# Patient Record
Sex: MALE | Race: WHITE | NOT HISPANIC OR LATINO | Employment: OTHER | URBAN - METROPOLITAN AREA
[De-identification: names, ages, dates, MRNs, and addresses within clinical notes are randomized per-mention and may not be internally consistent; named-entity substitution may affect disease eponyms.]

---

## 2023-04-17 PROBLEM — N52.9 MALE ERECTILE DISORDER OF ORGANIC ORIGIN: Status: ACTIVE | Noted: 2023-04-17

## 2023-04-17 PROBLEM — M25.562 BILATERAL KNEE PAIN: Status: ACTIVE | Noted: 2023-04-17

## 2023-04-17 PROBLEM — R05.3 CHRONIC COUGH: Status: ACTIVE | Noted: 2023-04-17

## 2023-04-17 PROBLEM — R60.0 LOWER LEG EDEMA: Status: ACTIVE | Noted: 2023-04-17

## 2023-04-17 PROBLEM — M62.81 MUSCLE WEAKNESS (GENERALIZED): Status: ACTIVE | Noted: 2023-04-17

## 2023-04-17 PROBLEM — M32.14 LUPUS NEPHRITIS (MULTI): Status: ACTIVE | Noted: 2023-04-17

## 2023-04-17 PROBLEM — F41.0 PANIC DISORDER WITHOUT AGORAPHOBIA: Status: ACTIVE | Noted: 2023-04-17

## 2023-04-17 PROBLEM — I10 BENIGN ESSENTIAL HYPERTENSION: Status: ACTIVE | Noted: 2023-04-17

## 2023-04-17 PROBLEM — D12.6 TUBULAR ADENOMA OF COLON: Status: ACTIVE | Noted: 2023-04-17

## 2023-04-17 PROBLEM — I11.9 LVH (LEFT VENTRICULAR HYPERTROPHY) DUE TO HYPERTENSIVE DISEASE: Status: ACTIVE | Noted: 2023-04-17

## 2023-04-17 PROBLEM — M25.551: Status: ACTIVE | Noted: 2023-04-17

## 2023-04-17 PROBLEM — K40.90 RIGHT INGUINAL HERNIA: Status: ACTIVE | Noted: 2023-04-17

## 2023-04-17 PROBLEM — M62.559 ATROPHY OF MUSCLE OF THIGH: Status: ACTIVE | Noted: 2023-04-17

## 2023-04-17 PROBLEM — L98.9 BENIGN SKIN LESION OF MULTIPLE SITES: Status: ACTIVE | Noted: 2023-04-17

## 2023-04-17 PROBLEM — M79.606 LEG PAIN: Status: ACTIVE | Noted: 2023-04-17

## 2023-04-17 PROBLEM — M25.552: Status: ACTIVE | Noted: 2023-04-17

## 2023-04-17 PROBLEM — K52.9 CHRONIC DIARRHEA: Status: ACTIVE | Noted: 2023-04-17

## 2023-04-17 PROBLEM — M25.561 BILATERAL KNEE PAIN: Status: ACTIVE | Noted: 2023-04-17

## 2023-04-17 PROBLEM — R91.8 LUNG NODULE, MULTIPLE: Status: ACTIVE | Noted: 2023-04-17

## 2023-04-17 PROBLEM — R36.1 HEMATOSPERMIA: Status: ACTIVE | Noted: 2023-04-17

## 2023-04-17 PROBLEM — C91.10 CHRONIC LYMPHOCYTIC LEUKEMIA (MULTI): Status: ACTIVE | Noted: 2023-04-17

## 2023-04-17 PROBLEM — E78.5 HYPERLIPIDEMIA, UNSPECIFIED: Status: ACTIVE | Noted: 2023-04-17

## 2023-04-17 PROBLEM — N40.0 BENIGN ENLARGEMENT OF PROSTATE: Status: ACTIVE | Noted: 2023-04-17

## 2023-04-17 PROBLEM — M70.72 BURSITIS OF LEFT HIP: Status: ACTIVE | Noted: 2023-04-17

## 2023-04-17 PROBLEM — R16.1 SPLENOMEGALY: Status: ACTIVE | Noted: 2023-04-17

## 2023-04-17 PROBLEM — L40.9 PSORIASIS: Status: ACTIVE | Noted: 2023-04-17

## 2023-04-17 PROBLEM — N04.9 NEPHROTIC SYNDROME: Status: ACTIVE | Noted: 2023-04-17

## 2023-04-17 PROBLEM — I15.1 HYPERTENSION SECONDARY TO OTHER RENAL DISORDERS: Status: ACTIVE | Noted: 2023-04-17

## 2023-04-17 PROBLEM — R29.898 MUSCULAR DECONDITIONING: Status: ACTIVE | Noted: 2023-04-17

## 2023-04-17 PROBLEM — F41.0 PANIC ATTACKS: Status: ACTIVE | Noted: 2023-04-17

## 2023-04-17 PROBLEM — S36.029A: Status: ACTIVE | Noted: 2023-04-17

## 2023-04-17 PROBLEM — E55.9 VITAMIN D DEFICIENCY, UNSPECIFIED: Status: ACTIVE | Noted: 2023-04-17

## 2023-04-17 PROBLEM — M10.9 GOUT: Status: ACTIVE | Noted: 2023-04-17

## 2023-04-18 ENCOUNTER — OFFICE VISIT (OUTPATIENT)
Dept: PRIMARY CARE | Facility: CLINIC | Age: 73
End: 2023-04-18
Payer: MEDICARE

## 2023-04-18 VITALS
RESPIRATION RATE: 16 BRPM | HEART RATE: 80 BPM | DIASTOLIC BLOOD PRESSURE: 70 MMHG | SYSTOLIC BLOOD PRESSURE: 128 MMHG | HEIGHT: 75 IN | OXYGEN SATURATION: 98 % | BODY MASS INDEX: 23.5 KG/M2 | TEMPERATURE: 98.1 F | WEIGHT: 189 LBS

## 2023-04-18 DIAGNOSIS — M79.604 PAIN IN BOTH LOWER EXTREMITIES: ICD-10-CM

## 2023-04-18 DIAGNOSIS — I10 BENIGN ESSENTIAL HYPERTENSION: ICD-10-CM

## 2023-04-18 DIAGNOSIS — N52.9 MALE ERECTILE DISORDER OF ORGANIC ORIGIN: ICD-10-CM

## 2023-04-18 DIAGNOSIS — N40.0 BENIGN PROSTATIC HYPERPLASIA WITHOUT LOWER URINARY TRACT SYMPTOMS: Primary | ICD-10-CM

## 2023-04-18 DIAGNOSIS — R29.898 MUSCULAR DECONDITIONING: ICD-10-CM

## 2023-04-18 DIAGNOSIS — C91.10 CHRONIC LYMPHOCYTIC LEUKEMIA (MULTI): ICD-10-CM

## 2023-04-18 DIAGNOSIS — M79.605 PAIN IN BOTH LOWER EXTREMITIES: ICD-10-CM

## 2023-04-18 DIAGNOSIS — E78.2 MIXED HYPERLIPIDEMIA: ICD-10-CM

## 2023-04-18 PROBLEM — R05.3 CHRONIC COUGH: Status: RESOLVED | Noted: 2023-04-17 | Resolved: 2023-04-18

## 2023-04-18 PROBLEM — N04.9 NEPHROTIC SYNDROME: Status: RESOLVED | Noted: 2023-04-17 | Resolved: 2023-04-18

## 2023-04-18 PROBLEM — M32.14 LUPUS NEPHRITIS (MULTI): Status: RESOLVED | Noted: 2023-04-17 | Resolved: 2023-04-18

## 2023-04-18 PROCEDURE — 1170F FXNL STATUS ASSESSED: CPT | Performed by: INTERNAL MEDICINE

## 2023-04-18 PROCEDURE — 1160F RVW MEDS BY RX/DR IN RCRD: CPT | Performed by: INTERNAL MEDICINE

## 2023-04-18 PROCEDURE — 99214 OFFICE O/P EST MOD 30 MIN: CPT | Performed by: INTERNAL MEDICINE

## 2023-04-18 PROCEDURE — 1036F TOBACCO NON-USER: CPT | Performed by: INTERNAL MEDICINE

## 2023-04-18 PROCEDURE — 3078F DIAST BP <80 MM HG: CPT | Performed by: INTERNAL MEDICINE

## 2023-04-18 PROCEDURE — G0439 PPPS, SUBSEQ VISIT: HCPCS | Performed by: INTERNAL MEDICINE

## 2023-04-18 PROCEDURE — 3074F SYST BP LT 130 MM HG: CPT | Performed by: INTERNAL MEDICINE

## 2023-04-18 PROCEDURE — 1159F MED LIST DOCD IN RCRD: CPT | Performed by: INTERNAL MEDICINE

## 2023-04-18 PROCEDURE — 1157F ADVNC CARE PLAN IN RCRD: CPT | Performed by: INTERNAL MEDICINE

## 2023-04-18 RX ORDER — IBUPROFEN 600 MG/1
1 TABLET ORAL EVERY 6 HOURS PRN
COMMUNITY
Start: 2023-02-10 | End: 2023-10-18 | Stop reason: ALTCHOICE

## 2023-04-18 RX ORDER — ALPRAZOLAM 0.5 MG/1
0.5 TABLET ORAL DAILY PRN
COMMUNITY
Start: 2022-11-14

## 2023-04-18 RX ORDER — SILDENAFIL 50 MG/1
50 TABLET, FILM COATED ORAL DAILY PRN
COMMUNITY
End: 2023-11-20 | Stop reason: SDUPTHER

## 2023-04-18 RX ORDER — LOSARTAN POTASSIUM 50 MG/1
50 TABLET ORAL DAILY
COMMUNITY
End: 2023-06-05 | Stop reason: SDUPTHER

## 2023-04-18 ASSESSMENT — ACTIVITIES OF DAILY LIVING (ADL)
DRESSING: INDEPENDENT
GROCERY_SHOPPING: INDEPENDENT
TAKING_MEDICATION: INDEPENDENT
MANAGING_FINANCES: INDEPENDENT
DOING_HOUSEWORK: INDEPENDENT
BATHING: INDEPENDENT

## 2023-04-18 ASSESSMENT — ENCOUNTER SYMPTOMS
SLEEP DISTURBANCE: 0
TROUBLE SWALLOWING: 0
DIARRHEA: 0
CONSTIPATION: 0
ABDOMINAL PAIN: 0
WEAKNESS: 1
MYALGIAS: 1

## 2023-04-18 ASSESSMENT — PATIENT HEALTH QUESTIONNAIRE - PHQ9
1. LITTLE INTEREST OR PLEASURE IN DOING THINGS: NOT AT ALL
2. FEELING DOWN, DEPRESSED OR HOPELESS: NOT AT ALL
SUM OF ALL RESPONSES TO PHQ9 QUESTIONS 1 AND 2: 0

## 2023-04-18 NOTE — PROGRESS NOTES
Patient here for a Medicare Wellness visit and follow up    Subjective   Patient ID: Rafael Walter is a 72 y.o. male who presents for Medicare Annual Wellness Visit Subsequent and Follow-up.    The patient reports ongoing bilateral lower limb pain and weakness he suspects is due to either physical deconditioning or is a side effect from chemotherapy.  The pain seems to occur with standing and walking, and subsides with rest.  He completed a number of physical therapy sessions which has helped.  He is also using a TENS device at home which provides some relief.  He is not currently on statin therapy.      The patient continues to receive once monthly immunoglobulin infusion therapy for CLL.  He has been off of daily oral chemotherapy since 6/2022, and states he is feeling relatively well.  He undergoes occasional CT scans for surveillance.  He is currently following with  from Hematology/Oncology.    The patient reports longstanding nocturia of about 3 times per night which is normal for him.      The patient wears a hearing aid device, and prescription lenses both of which are working well.  The patient was recently found to have left deteriorating optic nerve and is following with Ophthalmology for monitoring.  He completed an MRI in 12/2022 which he recalls was reassuring.  He takes sleeping pills over the counter two or three times a week, and notes good sleep quality.  He denies any dysphagia, abdominal pain, or bowel problems.        Review of Systems   HENT:  Positive for hearing loss. Negative for trouble swallowing.    Gastrointestinal:  Negative for abdominal pain, constipation and diarrhea.   Genitourinary:         Positive for nocturia.   Musculoskeletal:  Positive for myalgias.   Neurological:  Positive for weakness.   Psychiatric/Behavioral:  Negative for sleep disturbance.        Objective   Physical Exam  Constitutional:       Appearance: Normal appearance.   Cardiovascular:      Rate and  Rhythm: Normal rate and regular rhythm.      Heart sounds: Normal heart sounds.      Comments: No carotid bruit bilaterally.  Pulmonary:      Effort: Pulmonary effort is normal.      Breath sounds: Normal breath sounds.   Abdominal:      General: Bowel sounds are normal.      Palpations: Abdomen is soft.      Tenderness: There is no abdominal tenderness.   Musculoskeletal:      Comments: Negative straight leg raise test.     Skin:     General: Skin is warm and dry.   Neurological:      General: No focal deficit present.      Mental Status: He is alert and oriented to person, place, and time. Mental status is at baseline.   Psychiatric:         Mood and Affect: Mood normal.         Behavior: Behavior normal.       Assessment/Plan   Problem List Items Addressed This Visit       Muscular deconditioning    Benign enlargement of prostate - Primary    Relevant Orders    PSA    Benign essential hypertension    Chronic lymphocytic leukemia (CMS/HCC)    Hyperlipidemia, unspecified    Relevant Orders    Lipid panel    Leg pain    Relevant Orders    CK    Male erectile disorder of organic origin       Medicare Wellness Examination Done  -  Discussed healthy diet and regular exercise.    -  Physical exam overall unremarkable. Immunizations reviewed and updated accordingly. Healthy lifestyle choices discussed (tobacco avoidance, appropriate alcohol use, avoidance of illicit substances).   -  Patient is wearing seatbelt.   -  Screening lab work ordered as indicated.    -  Age appropriate screening tests reviewed with patient.       IMPRESSIONS/PLAN:    Physical Deconditioning/Lower limb Pain  -Advised patient to ask  if this may be related to chemotherapy.  Continue with exercises at home for now.  Ordered CK.  Call the clinic if symptoms persist or worsen, and will order referral for Physical Therapy.    HLD  - Ordered lipid panel.    BPH/Nocturia   - Last PSA wnl 6/2022.      HTN   - /70. Continue Losartan 50 mg  daily,     Lupus nephritis   - has seen nephrology / rheum    Multiple Skin Lesion   - Referral to Dermatology given to patient.     Chronic lymphocytic leukemia   - Following with Dr. Field in Oncology.     Health Maintenance  -Routine labs 1/2023.  Last PSA wnl 6/2022.  Last Cologuard 2022, repeat due 2025. Advised patient to receive Shingrix series, TDAP, and Pneumonia immunization through the pharmacy separately, after receiving go ahead from  from Hematology/Oncology.  Discussed adverse effects.     Follow up for annual physical examination, call sooner if needed.       Scribe Attestation  By signing my name below, I, Frank Mckinnon   attest that this documentation has been prepared under the direction and in the presence of Milo Gold DO.

## 2023-04-21 ENCOUNTER — LAB (OUTPATIENT)
Dept: LAB | Facility: LAB | Age: 73
End: 2023-04-21
Payer: MEDICARE

## 2023-04-21 DIAGNOSIS — N40.0 BENIGN PROSTATIC HYPERPLASIA WITHOUT LOWER URINARY TRACT SYMPTOMS: ICD-10-CM

## 2023-04-21 DIAGNOSIS — M79.605 PAIN IN BOTH LOWER EXTREMITIES: ICD-10-CM

## 2023-04-21 DIAGNOSIS — M79.604 PAIN IN BOTH LOWER EXTREMITIES: ICD-10-CM

## 2023-04-21 DIAGNOSIS — E78.2 MIXED HYPERLIPIDEMIA: ICD-10-CM

## 2023-04-21 LAB
CHOLESTEROL (MG/DL) IN SER/PLAS: 148 MG/DL (ref 0–199)
CHOLESTEROL IN HDL (MG/DL) IN SER/PLAS: 25.6 MG/DL
CHOLESTEROL/HDL RATIO: 5.8
CREATINE KINASE (U/L) IN SER/PLAS: 30 U/L (ref 0–325)
LDL: 79 MG/DL (ref 0–99)
NON HDL CHOLESTEROL: 122 MG/DL
PROSTATE SPECIFIC AG (NG/ML) IN SER/PLAS: 2.87 NG/ML (ref 0–4)
TRIGLYCERIDE (MG/DL) IN SER/PLAS: 216 MG/DL (ref 0–149)
VLDL: 43 MG/DL (ref 0–40)

## 2023-04-21 PROCEDURE — 80061 LIPID PANEL: CPT

## 2023-04-21 PROCEDURE — 84153 ASSAY OF PSA TOTAL: CPT

## 2023-04-21 PROCEDURE — 36415 COLL VENOUS BLD VENIPUNCTURE: CPT

## 2023-04-21 PROCEDURE — 82550 ASSAY OF CK (CPK): CPT

## 2023-06-01 ENCOUNTER — TELEPHONE (OUTPATIENT)
Dept: PRIMARY CARE | Facility: CLINIC | Age: 73
End: 2023-06-01
Payer: MEDICARE

## 2023-06-01 NOTE — TELEPHONE ENCOUNTER
Pt would like to know if he can get an order for his legs for PT to see what is going on. He said he had PT in the fall that Dr. Gold ordered. Please advise

## 2023-06-02 ENCOUNTER — TELEPHONE (OUTPATIENT)
Dept: PRIMARY CARE | Facility: CLINIC | Age: 73
End: 2023-06-02
Payer: MEDICARE

## 2023-06-02 DIAGNOSIS — M79.605 PAIN IN BOTH LOWER EXTREMITIES: Primary | ICD-10-CM

## 2023-06-02 DIAGNOSIS — M79.604 PAIN IN BOTH LOWER EXTREMITIES: Primary | ICD-10-CM

## 2023-06-02 NOTE — TELEPHONE ENCOUNTER
Patient needs a new PT referral put into system as he tried to schedule an appointment, and they indicated that a new referral needs to be placed. Please advise when new order is put into system.

## 2023-06-05 ENCOUNTER — TELEPHONE (OUTPATIENT)
Dept: PRIMARY CARE | Facility: CLINIC | Age: 73
End: 2023-06-05
Payer: MEDICARE

## 2023-06-05 DIAGNOSIS — I10 BENIGN ESSENTIAL HYPERTENSION: Primary | ICD-10-CM

## 2023-06-05 RX ORDER — LOSARTAN POTASSIUM 50 MG/1
50 TABLET ORAL DAILY
Qty: 90 TABLET | Refills: 2 | Status: SHIPPED | OUTPATIENT
Start: 2023-06-05

## 2023-06-05 NOTE — TELEPHONE ENCOUNTER
Patients wife [phone]- Patient has discussed leg issues in the past at visits, he does wear socks at night to help with circulation. They're flying this week is this going to be an issues for the patient. She would like to discuss this with you before they travel- Could this be also a vascular issue?

## 2023-06-07 ENCOUNTER — OFFICE VISIT (OUTPATIENT)
Dept: PRIMARY CARE | Facility: CLINIC | Age: 73
End: 2023-06-07
Payer: MEDICARE

## 2023-06-07 VITALS
HEART RATE: 77 BPM | BODY MASS INDEX: 24.07 KG/M2 | SYSTOLIC BLOOD PRESSURE: 148 MMHG | RESPIRATION RATE: 16 BRPM | TEMPERATURE: 97.9 F | WEIGHT: 190 LBS | OXYGEN SATURATION: 100 % | DIASTOLIC BLOOD PRESSURE: 80 MMHG

## 2023-06-07 DIAGNOSIS — M25.561 ACUTE PAIN OF RIGHT KNEE: Primary | ICD-10-CM

## 2023-06-07 PROCEDURE — 3079F DIAST BP 80-89 MM HG: CPT | Performed by: INTERNAL MEDICINE

## 2023-06-07 PROCEDURE — 3077F SYST BP >= 140 MM HG: CPT | Performed by: INTERNAL MEDICINE

## 2023-06-07 PROCEDURE — 1160F RVW MEDS BY RX/DR IN RCRD: CPT | Performed by: INTERNAL MEDICINE

## 2023-06-07 PROCEDURE — 1036F TOBACCO NON-USER: CPT | Performed by: INTERNAL MEDICINE

## 2023-06-07 PROCEDURE — 99214 OFFICE O/P EST MOD 30 MIN: CPT | Performed by: INTERNAL MEDICINE

## 2023-06-07 PROCEDURE — 1159F MED LIST DOCD IN RCRD: CPT | Performed by: INTERNAL MEDICINE

## 2023-06-07 PROCEDURE — 1157F ADVNC CARE PLAN IN RCRD: CPT | Performed by: INTERNAL MEDICINE

## 2023-06-07 ASSESSMENT — ENCOUNTER SYMPTOMS
JOINT SWELLING: 0
LEG PAIN: 1

## 2023-06-07 NOTE — PROGRESS NOTES
Patient here for leg pain     Subjective   Patient ID: Rafael Walter is a 72 y.o. male who presents for Leg Pain.  He is accompanied by his son, Carlos Eduardo, who offers some of the history.    The patient reports worsening intermittent right lower limb and knee pain as he has been working while kneeling on concrete.  He has been using foam rollers which have been minimally beneficial, but reports difficulty with sleep due to pain.  The patient does endorse some knee instability.  He recalls injuring the right hamstring while waterskiing many years prior.      The patient recently completed blood work for  from Hematology/Oncology for CLL, and reports that the condition is stable.  He notes that his Physician will be transferred to a different department, and will be following with a new Specialist at the same clinic soon.      Leg Pain       Review of Systems   Musculoskeletal:  Negative for joint swelling.        Positive for right lower limb pain, right knee pain.       Objective   Physical Exam  Constitutional:       Appearance: Normal appearance.   Cardiovascular:      Rate and Rhythm: Normal rate and regular rhythm.      Heart sounds: Normal heart sounds.   Pulmonary:      Effort: Pulmonary effort is normal.      Breath sounds: Normal breath sounds.   Abdominal:      General: Bowel sounds are normal.      Palpations: Abdomen is soft.      Tenderness: There is no abdominal tenderness.   Musculoskeletal:         General: No swelling or tenderness.      Comments: No erythema, swelling, or tenderness in the right calf.  Positive for crepitus in the right knee joint.   Skin:     General: Skin is warm and dry.   Neurological:      General: No focal deficit present.      Mental Status: He is alert and oriented to person, place, and time. Mental status is at baseline.   Psychiatric:         Mood and Affect: Mood normal.         Behavior: Behavior normal.       Assessment/Plan   Problem List Items Addressed This  Visit    None  Visit Diagnoses       Acute pain of right knee    -  Primary    Relevant Orders    XR knee right 3 views            IMPRESSIONS/PLAN:     Physical Deconditioning/Right Lower limb Pain  -No tenderness, erythema or swelling in the knee or calf.   Good DP / PT pulses. Crepitus present in the right knee.  Unlikely due to vascular causes.  CK wnl 4/2023.  Ordered Xray Right Knee.  Advised the patient to try Voltaren gel sparingly over the counter, as directed on the packaging.  Can additionally use Tylenol as needed.  Continue with exercises at home for now.  Call the clinic if symptoms persist, and will order referral to Physical Therapy.     HLD  - Triglycerides high per 4/2023.     BPH/Nocturia   - Last PSA wnl 6/2022.       HTN   - /80. Continue Losartan 50 mg daily,     Lupus nephritis   - has seen nephrology / rheum     Multiple Skin Lesion   - Referral to Dermatology given to patient.     Chronic lymphocytic leukemia   - Following with Dr. Field in Oncology.     Health Maintenance  -Routine labs 1/2023.  Last PSA wnl 6/2022.  Last Cologuard 2022, repeat due 2025. Advised patient to receive Shingrix series, TDAP, and Pneumonia immunization through the pharmacy separately, after receiving go ahead from  from Hematology/Oncology.  Discussed adverse effects.     Follow up for annual physical examination, call sooner if needed.       Scribe Attestation  By signing my name below, ILyly Scribe   attest that this documentation has been prepared under the direction and in the presence of Milo Gold DO.

## 2023-06-14 ENCOUNTER — TELEPHONE (OUTPATIENT)
Dept: PRIMARY CARE | Facility: CLINIC | Age: 73
End: 2023-06-14
Payer: MEDICARE

## 2023-06-14 DIAGNOSIS — M25.569 KNEE PAIN, UNSPECIFIED CHRONICITY, UNSPECIFIED LATERALITY: Primary | ICD-10-CM

## 2023-06-14 RX ORDER — GABAPENTIN 300 MG/1
300 CAPSULE ORAL NIGHTLY
Qty: 30 CAPSULE | Refills: 0 | Status: SHIPPED | OUTPATIENT
Start: 2023-06-14 | End: 2023-10-18 | Stop reason: ALTCHOICE

## 2023-06-14 NOTE — TELEPHONE ENCOUNTER
Had his knee xray and has a call into PT to schedule physical therapy however pt did not sleep last night due to pain and wanted to know if he can get a stronger medication. Pharmacy [facility]

## 2023-06-14 NOTE — TELEPHONE ENCOUNTER
X-ray showed mild knee osteoarthritis- otherwise looked OK.  Would recommend seeing the walk-in clinic at [facility] in [place] if pain is that bad- may benefit from a potential steroid shot if they feel that is appropriate.

## 2023-06-14 NOTE — TELEPHONE ENCOUNTER
I sent him in [facility] to take at bedtime.  Can make him a little groggy so only use at bedtime.  Should help with the pain a bit.  Continue to use the topicals as well.  Can use a bit of Tylenol (stay under 2,000 mg daily) as needed also.

## 2023-06-14 NOTE — TELEPHONE ENCOUNTER
Patient called back. Couldn't get into clinic in [place]. Tapia until next Thursday. Is there any pain relief medications that he can be prescribed until his upcoming appt.?

## 2023-10-18 ENCOUNTER — OFFICE VISIT (OUTPATIENT)
Dept: PRIMARY CARE | Facility: CLINIC | Age: 73
End: 2023-10-18
Payer: MEDICARE

## 2023-10-18 VITALS
OXYGEN SATURATION: 97 % | WEIGHT: 192 LBS | RESPIRATION RATE: 16 BRPM | SYSTOLIC BLOOD PRESSURE: 142 MMHG | TEMPERATURE: 97.1 F | BODY MASS INDEX: 24 KG/M2 | HEART RATE: 76 BPM | DIASTOLIC BLOOD PRESSURE: 80 MMHG

## 2023-10-18 DIAGNOSIS — Z23 ENCOUNTER FOR IMMUNIZATION: Primary | ICD-10-CM

## 2023-10-18 DIAGNOSIS — I10 BENIGN ESSENTIAL HYPERTENSION: ICD-10-CM

## 2023-10-18 DIAGNOSIS — C91.10 CHRONIC LYMPHOCYTIC LEUKEMIA (MULTI): ICD-10-CM

## 2023-10-18 DIAGNOSIS — E78.2 MIXED HYPERLIPIDEMIA: ICD-10-CM

## 2023-10-18 PROCEDURE — 90662 IIV NO PRSV INCREASED AG IM: CPT | Performed by: INTERNAL MEDICINE

## 2023-10-18 PROCEDURE — 1159F MED LIST DOCD IN RCRD: CPT | Performed by: INTERNAL MEDICINE

## 2023-10-18 PROCEDURE — G0008 ADMIN INFLUENZA VIRUS VAC: HCPCS | Performed by: INTERNAL MEDICINE

## 2023-10-18 PROCEDURE — 99214 OFFICE O/P EST MOD 30 MIN: CPT | Performed by: INTERNAL MEDICINE

## 2023-10-18 PROCEDURE — 3079F DIAST BP 80-89 MM HG: CPT | Performed by: INTERNAL MEDICINE

## 2023-10-18 PROCEDURE — 1036F TOBACCO NON-USER: CPT | Performed by: INTERNAL MEDICINE

## 2023-10-18 PROCEDURE — 3077F SYST BP >= 140 MM HG: CPT | Performed by: INTERNAL MEDICINE

## 2023-10-18 PROCEDURE — 1160F RVW MEDS BY RX/DR IN RCRD: CPT | Performed by: INTERNAL MEDICINE

## 2023-10-18 RX ORDER — MELOXICAM 15 MG/1
15 TABLET ORAL DAILY PRN
COMMUNITY
End: 2024-01-22 | Stop reason: ALTCHOICE

## 2023-10-18 ASSESSMENT — ENCOUNTER SYMPTOMS: ROS SKIN COMMENTS: POSITIVE FOR SKIN LESIONS.

## 2023-10-18 NOTE — PROGRESS NOTES
Patient here for a 6 month follow up    Subjective   Patient ID: Rafael Walter is a 73 y.o. male who presents for Follow-up.    The patient has established care with  from Hematology/Oncology for a history of CLL.  He notes that the condition is stable.    The patient maintains an active lifestyle, and exercises at the gym regularly.  He has also been following with Physical Therapy for deconditioning, and feels this is helping.    The patient met with Orthopedic Surgery, and was prescribed meloxicam for right knee pain which has been effective.  He has not been taking Ibuprofen or gabapentin.      The patient has been measuring his blood pressure at home, and notes that readings are within the normal range.     The patient reports a skin lesion for less than year.  He recalls a history of skin cancer in the past.      The patient mentions blurry vision in the left eye that was found to be due to an issue with the left optic nerve.  He continues to follow with Ophthalmology.      Review of Systems   Musculoskeletal:         Positive for right knee pain.   Skin:         Positive for skin lesions.   All other systems reviewed and are negative.    Objective   Physical Exam  Constitutional:       Appearance: Normal appearance.   Neck:      Vascular: No carotid bruit.   Cardiovascular:      Rate and Rhythm: Normal rate and regular rhythm.      Heart sounds: Normal heart sounds.   Pulmonary:      Effort: Pulmonary effort is normal.      Breath sounds: Normal breath sounds.   Abdominal:      General: Bowel sounds are normal.      Palpations: Abdomen is soft.      Tenderness: There is no abdominal tenderness.   Skin:     General: Skin is warm and dry.   Neurological:      General: No focal deficit present.      Mental Status: He is alert and oriented to person, place, and time. Mental status is at baseline.   Psychiatric:         Mood and Affect: Mood normal.         Behavior: Behavior normal.          Assessment/Plan   Problem List Items Addressed This Visit    None      IMPRESSIONS/PLAN:     Skin Lesion:  - Ordered referral to  in Dermatology.    HTN   - /80. Continue Losartan 50 mg daily,    HLD  - Triglycerides high per 4/2023.     BPH/Nocturia   - Last PSA wnl 4/2023.       Lupus nephritis   - has seen nephrology / rheum     Chronic lymphocytic leukemia   - Following with Dr. Field in Oncology.     Physical Deconditioning  - Following with Physical Therapy.     Right Knee Pain  -Xray Right Knee 6/2023. Showed mild osteoarthritis  Following with Orthopedic Surgery and Physical therapy.    Health Maintenance  -Routine labs 4/2023.  Last PSA wnl 4/2023.  Last Cologuard 2022, repeat due 2025. Advised patient to check with Hematology/Oncology before receivin Shingrix and RSV vaccines.  Patient received high-dose Influenza vaccine in the clinic today, tolerated well.      Follow up for annual physical examination, call sooner if needed.       Scribe Attestation  By signing my name below, ILyly Scribe   attest that this documentation has been prepared under the direction and in the presence of Milo Gold DO.

## 2023-10-25 ENCOUNTER — TELEPHONE (OUTPATIENT)
Dept: ORTHOPEDIC SURGERY | Facility: CLINIC | Age: 73
End: 2023-10-25
Payer: MEDICARE

## 2023-10-26 DIAGNOSIS — M17.11 OSTEOARTHRITIS OF RIGHT KNEE, UNSPECIFIED OSTEOARTHRITIS TYPE: Primary | ICD-10-CM

## 2023-10-26 RX ORDER — MELOXICAM 15 MG/1
15 TABLET ORAL DAILY
Qty: 30 TABLET | Refills: 0 | Status: SHIPPED | OUTPATIENT
Start: 2023-10-26 | End: 2023-11-24

## 2023-11-20 DIAGNOSIS — N52.9 MALE ERECTILE DISORDER OF ORGANIC ORIGIN: ICD-10-CM

## 2023-11-20 DIAGNOSIS — N52.9 MALE ERECTILE DISORDER OF ORGANIC ORIGIN: Primary | ICD-10-CM

## 2023-11-20 RX ORDER — SILDENAFIL 50 MG/1
50 TABLET, FILM COATED ORAL DAILY PRN
Qty: 10 TABLET | Refills: 1 | Status: SHIPPED | OUTPATIENT
Start: 2023-11-20 | End: 2024-05-03 | Stop reason: SDUPTHER

## 2023-11-20 RX ORDER — SILDENAFIL 50 MG/1
50 TABLET, FILM COATED ORAL DAILY PRN
Qty: 10 TABLET | Refills: 1 | Status: SHIPPED | OUTPATIENT
Start: 2023-11-20 | End: 2023-11-20 | Stop reason: SDUPTHER

## 2023-12-08 ENCOUNTER — APPOINTMENT (OUTPATIENT)
Dept: HEMATOLOGY/ONCOLOGY | Facility: HOSPITAL | Age: 73
End: 2023-12-08
Payer: MEDICARE

## 2023-12-14 ENCOUNTER — LAB (OUTPATIENT)
Dept: LAB | Facility: HOSPITAL | Age: 73
End: 2023-12-14
Payer: MEDICARE

## 2023-12-14 ENCOUNTER — OFFICE VISIT (OUTPATIENT)
Dept: HEMATOLOGY/ONCOLOGY | Facility: HOSPITAL | Age: 73
End: 2023-12-14
Payer: MEDICARE

## 2023-12-14 VITALS
OXYGEN SATURATION: 99 % | RESPIRATION RATE: 17 BRPM | DIASTOLIC BLOOD PRESSURE: 73 MMHG | WEIGHT: 190.1 LBS | HEART RATE: 67 BPM | TEMPERATURE: 97.3 F | SYSTOLIC BLOOD PRESSURE: 153 MMHG | BODY MASS INDEX: 24.4 KG/M2 | HEIGHT: 74 IN

## 2023-12-14 DIAGNOSIS — C91.10 CHRONIC LYMPHOCYTIC LEUKEMIA (MULTI): ICD-10-CM

## 2023-12-14 DIAGNOSIS — C91.10 CHRONIC LYMPHOCYTIC LEUKEMIA (MULTI): Primary | ICD-10-CM

## 2023-12-14 LAB
ALBUMIN SERPL BCP-MCNC: 4.2 G/DL (ref 3.4–5)
ALP SERPL-CCNC: 78 U/L (ref 33–136)
ALT SERPL W P-5'-P-CCNC: 12 U/L (ref 10–52)
ANION GAP SERPL CALC-SCNC: 11 MMOL/L (ref 10–20)
AST SERPL W P-5'-P-CCNC: 17 U/L (ref 9–39)
BASOPHILS # BLD AUTO: 0.02 X10*3/UL (ref 0–0.1)
BASOPHILS NFR BLD AUTO: 0.5 %
BILIRUB SERPL-MCNC: 0.6 MG/DL (ref 0–1.2)
BUN SERPL-MCNC: 19 MG/DL (ref 6–23)
CALCIUM SERPL-MCNC: 9.1 MG/DL (ref 8.6–10.3)
CHLORIDE SERPL-SCNC: 105 MMOL/L (ref 98–107)
CO2 SERPL-SCNC: 29 MMOL/L (ref 21–32)
CREAT SERPL-MCNC: 0.98 MG/DL (ref 0.5–1.3)
EOSINOPHIL # BLD AUTO: 0.08 X10*3/UL (ref 0–0.4)
EOSINOPHIL NFR BLD AUTO: 2 %
ERYTHROCYTE [DISTWIDTH] IN BLOOD BY AUTOMATED COUNT: 14.1 % (ref 11.5–14.5)
GFR SERPL CREATININE-BSD FRML MDRD: 80 ML/MIN/1.73M*2
GLUCOSE SERPL-MCNC: 94 MG/DL (ref 74–99)
HCT VFR BLD AUTO: 43.7 % (ref 41–52)
HGB BLD-MCNC: 14.5 G/DL (ref 13.5–17.5)
IMM GRANULOCYTES # BLD AUTO: 0.01 X10*3/UL (ref 0–0.5)
IMM GRANULOCYTES NFR BLD AUTO: 0.2 % (ref 0–0.9)
LYMPHOCYTES # BLD AUTO: 1.16 X10*3/UL (ref 0.8–3)
LYMPHOCYTES NFR BLD AUTO: 28.8 %
MCH RBC QN AUTO: 28.2 PG (ref 26–34)
MCHC RBC AUTO-ENTMCNC: 33.2 G/DL (ref 32–36)
MCV RBC AUTO: 85 FL (ref 80–100)
MONOCYTES # BLD AUTO: 0.42 X10*3/UL (ref 0.05–0.8)
MONOCYTES NFR BLD AUTO: 10.4 %
NEUTROPHILS # BLD AUTO: 2.34 X10*3/UL (ref 1.6–5.5)
NEUTROPHILS NFR BLD AUTO: 58.1 %
NRBC BLD-RTO: 0 /100 WBCS (ref 0–0)
PLATELET # BLD AUTO: 134 X10*3/UL (ref 150–450)
POTASSIUM SERPL-SCNC: 4.4 MMOL/L (ref 3.5–5.3)
PROT SERPL-MCNC: 7 G/DL (ref 6.4–8.2)
RBC # BLD AUTO: 5.15 X10*6/UL (ref 4.5–5.9)
SODIUM SERPL-SCNC: 141 MMOL/L (ref 136–145)
WBC # BLD AUTO: 4 X10*3/UL (ref 4.4–11.3)

## 2023-12-14 PROCEDURE — 1159F MED LIST DOCD IN RCRD: CPT | Performed by: INTERNAL MEDICINE

## 2023-12-14 PROCEDURE — 1160F RVW MEDS BY RX/DR IN RCRD: CPT | Performed by: INTERNAL MEDICINE

## 2023-12-14 PROCEDURE — 99214 OFFICE O/P EST MOD 30 MIN: CPT | Performed by: INTERNAL MEDICINE

## 2023-12-14 PROCEDURE — 1126F AMNT PAIN NOTED NONE PRSNT: CPT | Performed by: INTERNAL MEDICINE

## 2023-12-14 PROCEDURE — 36415 COLL VENOUS BLD VENIPUNCTURE: CPT

## 2023-12-14 PROCEDURE — 85025 COMPLETE CBC W/AUTO DIFF WBC: CPT

## 2023-12-14 PROCEDURE — 80053 COMPREHEN METABOLIC PANEL: CPT

## 2023-12-14 PROCEDURE — 1036F TOBACCO NON-USER: CPT | Performed by: INTERNAL MEDICINE

## 2023-12-14 PROCEDURE — 3077F SYST BP >= 140 MM HG: CPT | Performed by: INTERNAL MEDICINE

## 2023-12-14 PROCEDURE — 3078F DIAST BP <80 MM HG: CPT | Performed by: INTERNAL MEDICINE

## 2023-12-14 ASSESSMENT — ENCOUNTER SYMPTOMS
OCCASIONAL FEELINGS OF UNSTEADINESS: 0
DEPRESSION: 0
LOSS OF SENSATION IN FEET: 0

## 2023-12-14 ASSESSMENT — PAIN SCALES - GENERAL: PAINLEVEL: 0-NO PAIN

## 2023-12-28 NOTE — PROGRESS NOTES
"Patient ID: Rafael Walter is a 73 y.o. male.    Subjective    The patient has a history of CLL dated first in 2019, most recently having completed Venetoclax + ritux in 7/2022, details below.     Today he admits mild fatigue. No fever, weight loss, night sweating. No nausea.     Treatment Synopsis:   - 1/2016 until 2/2019, CASE 5913 (curcumin + vitamin D for CLL).   - One cycle of Chlorambucil  (started on 6/10/2019 8mg per day) and obinutuzumab (started 7/22/19 when PLT <100).  Obinutuzumab restarted on 9/4/19 with profound neutropenia .  - Venetoclax 200 mg daily (with fluconazole) with monthly rituximab  6/1/2020.  - Cycle #2 started on 7/13/20.  - Cycle #3 started on 8/10/20-therapy changed from 200 mg venetoclax dosing to 400  mg and fluconazole discontinued.  - Cycle #4 9/10/20- clon seq showed excellent response, CT chest showed resolution of bibasilar infiltrates.  - Cycle #5 started 10/8/2020.  - Cycle #6 11/5/2020- completed rituximab.  - Venetoclax continued through 7/2022 (2 full years).            Objective    BSA: 2.12 meters squared  /73   Pulse 67   Temp 36.3 °C (97.3 °F) (Skin)   Resp 17   Ht (S) 1.874 m (6' 1.78\")   Wt 86.2 kg (190 lb 1.6 oz)   SpO2 99%   BMI 24.55 kg/m²      Physical Exam  Constitutional:       General: He is not in acute distress.     Appearance: He is not toxic-appearing.   HENT:      Head: Normocephalic.      Nose: Nose normal.      Mouth/Throat:      Mouth: Mucous membranes are moist.   Eyes:      Extraocular Movements: Extraocular movements intact.      Pupils: Pupils are equal, round, and reactive to light.   Cardiovascular:      Rate and Rhythm: Normal rate and regular rhythm.      Heart sounds: No murmur heard.  Pulmonary:      Effort: Pulmonary effort is normal.      Breath sounds: Normal breath sounds.   Abdominal:      General: Bowel sounds are normal.      Palpations: Abdomen is soft. There is no mass.      Tenderness: There is no abdominal tenderness. " There is no rebound.   Musculoskeletal:         General: No swelling, tenderness, deformity or signs of injury.      Right lower leg: No edema.      Left lower leg: No edema.   Skin:     Coloration: Skin is not jaundiced.      Findings: No bruising, lesion or rash.   Neurological:      Mental Status: He is alert and oriented to person, place, and time.      Cranial Nerves: No cranial nerve deficit.      Motor: No weakness.      Gait: Gait normal.   Psychiatric:         Mood and Affect: Mood normal.         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  - S/p curcumin + vit D [id number] (CASE 5913).  - WBC progressed and peaked at 286k on 6/4/2019. (was 184k IN 2/2019)  - S/p chlorambucil + obinutuzumab started 6/2019; c/b mold PNA.  - By June 2020 WBC was essentially normal, but clonoseq shows persistent clones (>8000) in blood.  - S/p Venetoclax + rituximab (6/2020-11/2020). Venetoclax continued through 7/2022.  - Doing well OFF therapy at this time. CBC/ALC stable. No new worrisome symptoms reported today.  - No clear indication to initiate new treatments. MRD remains an investigational tool. Will defer testing.      #Increased hgb  - Indeterminate clinical significance. Will be monitored.      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 No recent infections.  - He is no longer on acyclovir or Bactrim.    Plan:  -Send q-IG  -RTC 3 mon with labs.     Time spent > 35 min, with more than 50% on counseling and coordinating care.    Cancer Staging   No matching staging information was found for the patient.    Oncology History    No history exists.                 Cecilio Luciano MD PhD

## 2024-01-18 ENCOUNTER — PATIENT MESSAGE (OUTPATIENT)
Dept: PRIMARY CARE | Facility: CLINIC | Age: 74
End: 2024-01-18
Payer: MEDICARE

## 2024-01-22 ENCOUNTER — OFFICE VISIT (OUTPATIENT)
Dept: PRIMARY CARE | Facility: CLINIC | Age: 74
End: 2024-01-22
Payer: MEDICARE

## 2024-01-22 ENCOUNTER — HOSPITAL ENCOUNTER (OUTPATIENT)
Dept: RADIOLOGY | Facility: CLINIC | Age: 74
Discharge: HOME | End: 2024-01-22
Payer: MEDICARE

## 2024-01-22 VITALS
SYSTOLIC BLOOD PRESSURE: 158 MMHG | WEIGHT: 192 LBS | OXYGEN SATURATION: 100 % | DIASTOLIC BLOOD PRESSURE: 80 MMHG | BODY MASS INDEX: 24.8 KG/M2 | TEMPERATURE: 97.6 F | RESPIRATION RATE: 16 BRPM | HEART RATE: 63 BPM

## 2024-01-22 DIAGNOSIS — M25.562 LEFT KNEE PAIN, UNSPECIFIED CHRONICITY: ICD-10-CM

## 2024-01-22 DIAGNOSIS — C91.10 CHRONIC LYMPHOCYTIC LEUKEMIA (MULTI): ICD-10-CM

## 2024-01-22 DIAGNOSIS — M25.552 LEFT HIP PAIN: Primary | ICD-10-CM

## 2024-01-22 PROCEDURE — 73502 X-RAY EXAM HIP UNI 2-3 VIEWS: CPT | Mod: LEFT SIDE | Performed by: RADIOLOGY

## 2024-01-22 PROCEDURE — 3077F SYST BP >= 140 MM HG: CPT | Performed by: INTERNAL MEDICINE

## 2024-01-22 PROCEDURE — 73562 X-RAY EXAM OF KNEE 3: CPT | Mod: LEFT SIDE | Performed by: RADIOLOGY

## 2024-01-22 PROCEDURE — 73562 X-RAY EXAM OF KNEE 3: CPT | Mod: LT

## 2024-01-22 PROCEDURE — 1160F RVW MEDS BY RX/DR IN RCRD: CPT | Performed by: INTERNAL MEDICINE

## 2024-01-22 PROCEDURE — 73502 X-RAY EXAM HIP UNI 2-3 VIEWS: CPT | Mod: LT

## 2024-01-22 PROCEDURE — 99214 OFFICE O/P EST MOD 30 MIN: CPT | Performed by: INTERNAL MEDICINE

## 2024-01-22 PROCEDURE — 1159F MED LIST DOCD IN RCRD: CPT | Performed by: INTERNAL MEDICINE

## 2024-01-22 PROCEDURE — 1036F TOBACCO NON-USER: CPT | Performed by: INTERNAL MEDICINE

## 2024-01-22 PROCEDURE — 1126F AMNT PAIN NOTED NONE PRSNT: CPT | Performed by: INTERNAL MEDICINE

## 2024-01-22 PROCEDURE — 3079F DIAST BP 80-89 MM HG: CPT | Performed by: INTERNAL MEDICINE

## 2024-01-22 RX ORDER — NAPROXEN 500 MG/1
500 TABLET ORAL 2 TIMES DAILY PRN
Qty: 60 TABLET | Refills: 0 | Status: SHIPPED | OUTPATIENT
Start: 2024-01-22 | End: 2024-02-19

## 2024-01-22 ASSESSMENT — ENCOUNTER SYMPTOMS: LEG PAIN: 1

## 2024-01-22 NOTE — PROGRESS NOTES
Patient here for left leg pain    Subjective   Patient ID: Rafael Walter is a 73 y.o. male who presents for Leg Pain.    The patient reports left knee pain extending to the foot as a result of slipping and injuring himself while using the laying leg curl machine at the gym about 10/2023.  This seems to have exacerbated previous knee pain, and symptoms are pronounced while walking.  He also endorses sleep difficulty due to the knee and left lower extremity pain.  The patient denies any calf pain.  He has been taking meloxicam 15mg once daily as needed, and Tylenol with partial relief.  The patient has been undergoing physical therapy and finds this is helping.  He has previously followed with  from Orthopedic Surgery.      The patient reports ongoing left hip pain.     Leg Pain       Review of Systems   Musculoskeletal:         Positive for left knee pain, and hip pain.       Objective   Physical Exam  Constitutional:       Appearance: Normal appearance.   Neck:      Vascular: No carotid bruit.   Cardiovascular:      Rate and Rhythm: Normal rate and regular rhythm.      Heart sounds: Normal heart sounds.   Pulmonary:      Effort: Pulmonary effort is normal.      Breath sounds: Normal breath sounds.   Abdominal:      General: Bowel sounds are normal.      Palpations: Abdomen is soft.      Tenderness: There is no abdominal tenderness.   Skin:     General: Skin is warm and dry.   Neurological:      General: No focal deficit present.      Mental Status: He is alert and oriented to person, place, and time. Mental status is at baseline.   Psychiatric:         Mood and Affect: Mood normal.         Behavior: Behavior normal.       Assessment/Plan   Problem List Items Addressed This Visit             ICD-10-CM    Chronic lymphocytic leukemia (CMS/HCC) C91.10     Other Visit Diagnoses         Codes    Left hip pain    -  Primary M25.552    Relevant Medications    naproxen (Naprosyn) 500 mg tablet    Left knee pain,  unspecified chronicity     M25.562    Relevant Orders    XR hip left with pelvis when performed 2 or 3 views    XR knee left 3 views            IMPRESSIONS/PLAN:     Left Knee Pain  - No pain over meniscus or patellar tendon.  Hip pain in inguinal region on straight leg raise test.  No calf pain.  Ordered Xray Left Knee.  Advised patient to continue with Physical Therapy.  Prescribed naproxen 500mg twice daily prn along with Tylenol 650mg one tablet at night time for severe symptoms.  Stop Meloxicam, and patient verbalized understanding.  Encouraged patient to follow up with  from Orthopedic Surgery pending results.      Left Hip Pain  - Xray 6/2023 showed severe bilateral hip osteoarthrosis with loss of joint space, subchondral cysts and spurring. No fracture is identified.  Ordered repeat Left Hip Xray to assess for injury.  Continue with physical therapy.  Prescribed naproxen 500mg twice daily prn along with Tylenol 650mg one tablet at night time for severe symptoms.  Stop Meloxicam, and patient verbalized understanding.  Encouraged patient to follow up with  from Orthopedic Surgery pending results.      HTN   - /80. Continue Losartan 50 mg daily,     HLD  - Triglycerides high per 4/2023.     BPH/Nocturia   - Last PSA wnl 4/2023.       Lupus nephritis   - has seen nephrology / rheum     Chronic lymphocytic leukemia   - Following with Dr. Field in Oncology.     Physical Deconditioning  - Following with Physical Therapy.      Right Knee Pain  -Xray Right Knee 6/2023. Showed mild osteoarthritis.  Following with Orthopedic Surgery and Physical therapy.     Skin Lesion:  - Previously ordered referral to  in Dermatology.    Health Maintenance  -Routine labs 4/2023.  Last PSA wnl 4/2023.  Last Cologuard 2022, repeat due 2025. Advised patient to check with Hematology/Oncology before receivin Shingrix and RSV vaccines.       Follow up for annual physical examination, call sooner if needed.        Scribe Attestation  By signing my name below, I, Frank Mckinnon   attest that this documentation has been prepared under the direction and in the presence of Milo Gold DO.   Lyly Vazquez 01/22/24 10:10 AM

## 2024-02-08 ENCOUNTER — OFFICE VISIT (OUTPATIENT)
Dept: ORTHOPEDIC SURGERY | Facility: CLINIC | Age: 74
End: 2024-02-08
Payer: MEDICARE

## 2024-02-08 DIAGNOSIS — M16.11 PRIMARY OSTEOARTHRITIS OF RIGHT HIP: Primary | ICD-10-CM

## 2024-02-08 DIAGNOSIS — M25.551 RIGHT HIP PAIN: ICD-10-CM

## 2024-02-08 PROCEDURE — 99214 OFFICE O/P EST MOD 30 MIN: CPT | Performed by: FAMILY MEDICINE

## 2024-02-08 PROCEDURE — 20611 DRAIN/INJ JOINT/BURSA W/US: CPT | Performed by: FAMILY MEDICINE

## 2024-02-08 PROCEDURE — 2500000005 HC RX 250 GENERAL PHARMACY W/O HCPCS: Performed by: FAMILY MEDICINE

## 2024-02-08 PROCEDURE — 1159F MED LIST DOCD IN RCRD: CPT | Performed by: FAMILY MEDICINE

## 2024-02-08 PROCEDURE — 1036F TOBACCO NON-USER: CPT | Performed by: FAMILY MEDICINE

## 2024-02-08 PROCEDURE — 2500000004 HC RX 250 GENERAL PHARMACY W/ HCPCS (ALT 636 FOR OP/ED): Performed by: FAMILY MEDICINE

## 2024-02-08 PROCEDURE — 1126F AMNT PAIN NOTED NONE PRSNT: CPT | Performed by: FAMILY MEDICINE

## 2024-02-08 PROCEDURE — 1157F ADVNC CARE PLAN IN RCRD: CPT | Performed by: FAMILY MEDICINE

## 2024-02-08 RX ADMIN — METHYLPREDNISOLONE ACETATE 80 MG: 40 INJECTION, SUSPENSION INTRALESIONAL; INTRAMUSCULAR; INTRASYNOVIAL; SOFT TISSUE at 15:21

## 2024-02-08 RX ADMIN — LIDOCAINE HYDROCHLORIDE 7 ML: 10 INJECTION, SOLUTION INFILTRATION; PERINEURAL at 15:21

## 2024-02-08 RX ADMIN — ROPIVACAINE HYDROCHLORIDE 4 ML: 5 INJECTION, SOLUTION EPIDURAL; INFILTRATION; PERINEURAL at 15:21

## 2024-02-08 NOTE — PROGRESS NOTES
Patient ID: Rafael Walter is a 73 y.o. male.    L Inj/Asp: R hip joint on 2/8/2024 3:21 PM  Indications: pain  Details: 22 G needle, ultrasound-guided anterior approach  Medications: 7 mL lidocaine 10 mg/mL (1 %); 4 mL ropivacaine; 80 mg methylPREDNISolone acetate 40 mg/mL  Outcome: tolerated well, no immediate complications  Procedure, treatment alternatives, risks and benefits explained, specific risks discussed. Immediately prior to procedure a time out was called to verify the correct patient, procedure, equipment, support staff and site/side marked as required. Patient was prepped and draped in the usual sterile fashion.       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     On 7/11/2022, he presented for evaluation of generalized diffuse bilateral leg pain upon referral by his PCP, Dr. Gold. He has chronic lymphocytic leukemia for the past 10 years and had a recent flareup in fall 2019. This required hospitalization and really wiped him out. He does not feel his current symptoms started then. He feels they have worsened over the past 3 to 4 months without injury or trauma. He denies specific back pain, hip or groin pain, or knee pain. He used to be active waterskiing and alpine skiing in the past. He describes intermittent pain mostly in the anterior thighs, between the hips and the knees. He denies any numbness or tingling. He reports he is always had skinny legs. He takes occasional over-the-counter Excedrin.   We discussed at length that I do not feel there is 1 particular orthopedic problem causing his symptoms. He clearly has mild to moderate knee arthrosis from exam and radiographs. He likely has moderate arthrosis at both hips based upon his exam findings. I am not worried about significant lumbar DDD based upon his history and exam. X-rays were deferred. He clearly is suffering  from muscular weakness, atrophy at both thighs likely due to deconditioning and/or side effect from chemotherapy for his CLL. We agreed upon trying formal physical therapy. He can take acetaminophen or anything recommended by his PCP.     On 9/7/2022, he returns for 2-month follow-up of chronic bilateral thigh/upper leg pain. He reports over 50% improvement with our conservative treatment plan. He feels like his issues are resolving. Physical therapy has helped greatly. He denies interval injury or trauma.   We agreed that he has improved greatly with our conservative treatment plan. We agreed to continue physical therapy with progression to home exercises. We did discuss his early subpatellar DJD and patellofemoral chondromalacia, which I recommended glucosamine and chondroitin joint vitamins. I am happy to see him back in the next 8 weeks or later if his symptoms persist or worsen.     Today, 2/8/2024, he returns for 17-month follow-up of chronic weakness in his legs and his main complaint is right hip pain.  He is with his wife.  He was recently evaluated by his PCP and had x-rays of the right hip which revealed DJD.  He has been having anterior right hip pain.  This is different than the previous leg weakness he presented with in 2022.  He denies recent injury or trauma.    He has no other complaints.    Physical Examination:     The RIGHT hip and pelvis are without obvious signs of acute bony deformity, swelling, erythema, ecchymosis or instability. Active and passive range of motion are limited and painful. Log roll is positive. Straight leg raise test is negative. Lelo is positive. Crossover is negative. Hip strength is weak as compared to the opposite hip. The opposite hip is otherwise nontender and stable. Gait is antalgic and tandem.    Imaging:  Radiographs of the right hip and pelvis were reviewed and revealed severe DJD which is symmetrical to the left.  There are no signs of acute fractures or  dislocations.  The studies were reviewed by me personally in the office today.    === 01/22/24 ===  XR HIP LEFT WITH PELVIS WHEN PERFORMED 2 OR 3 VIEWS  - Impression -  Severe degenerative changes of the hips.  Signed by: Umberto Ellen 1/23/2024 9:51 AM    Procedure:  After consent was obtained, the anterior right hip was prepped in a sterile fashion. Ultrasound guidance was used to help insure proper needle placement into the hip joint, decrease patient discomfort, and decrease collateral damage. The joint was visualized and Depo-Medrol 80 mg with lidocaine 4 mL & ropivacaine 4 mL were injected without any complications. Ultrasound images were saved on an internal file for later reference. The patient tolerated the procedure well and the area was cleaned and bandaged.    Problem List Items Addressed This Visit             ICD-10-CM    Right hip pain M25.551    Relevant Orders    Point of Care Ultrasound (Completed)    Primary osteoarthritis of right hip - Primary M16.11       Assessment and Plan:     We reviewed the exam and x-ray findings and discussed the conservative and surgical treatment options. We agreed to a diagnostic and hopefully therapeutic cortisone injection into the right hip.  He tolerated this well. Activity modifications were reviewed. I will see him back in 4-5 weeks or sooner as needed.    **This note was dictated using Dragon speech recognition software and was not corrected for spelling or grammatical errors**.    Carlos Eduardo Ureña MD  Sports Medicine Specialist  [facility] [place]

## 2024-02-12 RX ORDER — METHYLPREDNISOLONE ACETATE 40 MG/ML
80 INJECTION, SUSPENSION INTRA-ARTICULAR; INTRALESIONAL; INTRAMUSCULAR; SOFT TISSUE
Status: COMPLETED | OUTPATIENT
Start: 2024-02-08 | End: 2024-02-08

## 2024-02-12 RX ORDER — ROPIVACAINE HYDROCHLORIDE 5 MG/ML
4 INJECTION, SOLUTION EPIDURAL; INFILTRATION; PERINEURAL
Status: COMPLETED | OUTPATIENT
Start: 2024-02-08 | End: 2024-02-08

## 2024-02-12 RX ORDER — LIDOCAINE HYDROCHLORIDE 10 MG/ML
7 INJECTION INFILTRATION; PERINEURAL
Status: COMPLETED | OUTPATIENT
Start: 2024-02-08 | End: 2024-02-08

## 2024-02-18 DIAGNOSIS — M25.552 LEFT HIP PAIN: ICD-10-CM

## 2024-02-19 RX ORDER — NAPROXEN 500 MG/1
500 TABLET ORAL 2 TIMES DAILY PRN
Qty: 60 TABLET | Refills: 0 | OUTPATIENT
Start: 2024-02-19 | End: 2024-03-21

## 2024-02-26 ENCOUNTER — APPOINTMENT (OUTPATIENT)
Dept: ORTHOPEDIC SURGERY | Facility: CLINIC | Age: 74
End: 2024-02-26
Payer: MEDICARE

## 2024-03-07 ENCOUNTER — OFFICE VISIT (OUTPATIENT)
Dept: ORTHOPEDIC SURGERY | Facility: CLINIC | Age: 74
End: 2024-03-07
Payer: MEDICARE

## 2024-03-07 DIAGNOSIS — M16.11 PRIMARY OSTEOARTHRITIS OF RIGHT HIP: Primary | ICD-10-CM

## 2024-03-07 PROCEDURE — 99213 OFFICE O/P EST LOW 20 MIN: CPT | Performed by: FAMILY MEDICINE

## 2024-03-07 PROCEDURE — 1159F MED LIST DOCD IN RCRD: CPT | Performed by: FAMILY MEDICINE

## 2024-03-07 PROCEDURE — 1036F TOBACCO NON-USER: CPT | Performed by: FAMILY MEDICINE

## 2024-03-07 PROCEDURE — 1157F ADVNC CARE PLAN IN RCRD: CPT | Performed by: FAMILY MEDICINE

## 2024-03-07 NOTE — PROGRESS NOTES
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     On 7/11/2022, he presented for evaluation of generalized diffuse bilateral leg pain upon referral by his PCP, Dr. Gold. He has chronic lymphocytic leukemia for the past 10 years and had a recent flareup in fall 2019. This required hospitalization and really wiped him out. He does not feel his current symptoms started then. He feels they have worsened over the past 3 to 4 months without injury or trauma. He denies specific back pain, hip or groin pain, or knee pain. He used to be active waterskiing and alpine skiing in the past. He describes intermittent pain mostly in the anterior thighs, between the hips and the knees. He denies any numbness or tingling. He reports he is always had skinny legs. He takes occasional over-the-counter Excedrin.   We discussed at length that I do not feel there is 1 particular orthopedic problem causing his symptoms. He clearly has mild to moderate knee arthrosis from exam and radiographs. He likely has moderate arthrosis at both hips based upon his exam findings. I am not worried about significant lumbar DDD based upon his history and exam. X-rays were deferred. He clearly is suffering from muscular weakness, atrophy at both thighs likely due to deconditioning and/or side effect from chemotherapy for his CLL. We agreed upon trying formal physical therapy. He can take acetaminophen or anything recommended by his PCP.     On 9/7/2022, he returns for 2-month follow-up of chronic bilateral thigh/upper leg pain. He reports over 50% improvement with our conservative treatment plan. He feels like his issues are resolving. Physical therapy has helped greatly. He denies interval injury or trauma.   We agreed that he has improved greatly with our conservative treatment plan. We agreed to continue physical therapy with  progression to home exercises. We did discuss his early subpatellar DJD and patellofemoral chondromalacia, which I recommended glucosamine and chondroitin joint vitamins. I am happy to see him back in the next 8 weeks or later if his symptoms persist or worsen.     On 2/8/2024, he returns for 17-month follow-up of chronic weakness in his legs and his main complaint is right hip pain.  He is with his wife.  He was recently evaluated by his PCP and had x-rays of the right hip which revealed DJD.  He has been having anterior right hip pain.  This is different than the previous leg weakness he presented with in 2022.  He denies recent injury or trauma.  We agreed to a diagnostic and hopefully therapeutic cortisone injection into the right hip.  He tolerated this well. Activity modifications were reviewed. I will see him back in 4-5 weeks or sooner as needed.    Today, 3/7/2024, he returns for 1 month follow-up of chronic right hip pain.  He is with his daughter.  He reports 50% improvement.  At 1 week he had 80% improvement.  By 3 weeks it had decreased from 80 to 50%.  He would like to get more relief and be more active with exercise and recreational activities.  He is considering joint replacement.  He denies interval injury or trauma.  He has no other complaints.    He has no other complaints.    Physical Examination:     The RIGHT hip and pelvis are without obvious signs of acute bony deformity, swelling, erythema, ecchymosis or instability. Active and passive range of motion are limited and painful. Log roll is positive. Straight leg raise test is negative. Lelo is positive. Crossover is negative. Hip strength is weak as compared to the opposite hip. The opposite hip is otherwise nontender and stable. Gait is antalgic and tandem.    Imaging:  === 01/22/24 ===  XR HIP LEFT WITH PELVIS WHEN PERFORMED 2 OR 3 VIEWS  - Impression -  Severe degenerative changes of the hips.  Signed by: Umberto Hughes 1/23/2024 9:51  AM    Problem List Items Addressed This Visit             ICD-10-CM    Primary osteoarthritis of right hip - Primary M16.11       Assessment and Plan:     We reviewed the exam and x-ray findings and discussed the conservative and surgical treatment options. We agreed that he did get benefit from the recent cortisone injection but would like to have more benefit moving forward with his exercise and recreational activities.  We agreed upon a referral to Dr. Christianson to discuss joint replacement.  I am happy to see him back as needed.    **This note was dictated using Dragon speech recognition software and was not corrected for spelling or grammatical errors**.    Carlos Eduardo Ureña MD  Sports Medicine Specialist  [facility] [place]

## 2024-03-10 NOTE — PROGRESS NOTES
Fanta Christianson MD   Adult Reconstruction and Joint Replacement Surgery  Phone: [phone]     Fax: [phone]     INITIAL CONSULTATION      Date of Visit:  3/15/2024    CC: Left hip pain > Right hip pain     HPI:  Rafael Walter is a 73 y.o. male who presents with several years of BILATERAL hip pain. They were referred by Dr. Ureña. Occasionally having some left knee pain. Had CLL recurrence which has resulted in significant weight loss after chemo. He subsequently noticed his joint problems. Does report prior R hamstring tear from waterski injury.     Patient has tried the following Ice, NSAIDs, Activity modification, Physical therapy, Corticosteroid injections , and Xray. Date of last steroid injection: 2/8/24 - RIGHT HIP. Patient does have pain at night. Patient is able to walk 2-3 blocks. Patient is currently using nothing as assistive device. Primarily complains of groin pain. Patient has difficulty with donning and doffing shoes and socks, stairs, and getting in/out of car . The pain is significantly impacting his ability to perform activities of daily living. Patient reports no longer able to do activities such as walk stairs, walk normally, return to preferred recreational activities. The patient feels neither leg is shorter.     Focused History  PMH: Reviewed and PE/DVT: no  History of CLL with most recent flare in 2019. LVH.   PSH: Reviewed , Hip/Knee replacement: no, Hip/Knee surgery: no, Anesthesia complications: no, Spine surgery: no, Surgical infection: no, and Weight loss surgery: no  Meds: Reviewed, Current Anticoagulants: no, Weight loss medication: no, and Current Opioids: no  Allergies: Reviewed  and The patient reports no contraindications or allergies to cephalosporins, aspirin, NSAIDs or opioids, except as noted above.  FH: noNo family history of any bleeding or clotting disorders.  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     Past Surgical History:   Procedure Laterality Date    COLONOSCOPY  11/28/2012    Complete Colonoscopy    OTHER SURGICAL HISTORY  05/10/2019    Kidney biopsy       Allergies: He has No Known Allergies.     Medications:  Current Outpatient Medications   Medication Instructions    ALPRAZolam (XANAX) 0.5 mg, oral    losartan (COZAAR) 50 mg, oral, Daily    naproxen (NAPROSYN) 500 mg, oral, 2 times daily PRN    sildenafil (VIAGRA) 50 mg, oral, Daily PRN, 1 HOUR BEFORE SEXUAL ACTIVITY       Family History   Problem Relation Name Age of Onset    Other (parkinson) Mother      Pulmonary embolism Father       Documented in chart and reviewed.     Social History     Tobacco Use    Smoking status: Never    Smokeless tobacco: Never   Substance Use Topics    Alcohol use: Not Currently       Review of Systems: Review of systems completed with medical assistant intake. Please refer to this note.     Falls: The patient denies any recent falls or fall-related injuries.    Physical Exam:  BMI: 25, which is normal.     Constitutional: The patient is well-appearing and well groomed.     Neurological/Psychiatric: The patient is alert and oriented to person, place and time. The patient has a normal mood and affect.    Skin Examination: The skin over the right lower extremity, left lower extremity, right upper extremity, and left upper extremity is intact without any evidence of infection or rash.    Cardiovascular Examination: There are no varicosities and the skin is normal temperature, capillary refill  normal, arterial pulses normal, no edema.    Lymphatic Examination: There is no lymphatic swelling or palpable lymph nodes present around the involved joint.    Neurological Examination: Bilateral lower extremities are grossly neurologically intact. Sensation normal, motor function normal.    Gait: The patient ambulates with a coxalgic gait.     Lumbar spine:    No tenderness to palpation midline.    Negative straight leg raise bilaterally.    Left Hip Examination:    Examination of the hip reveals the skin to be intact.    There is mild tenderness over the greater trochanter.    There is no obvious swelling.    There is a positive Stinchfield test.    Range of motion is: full extension to 90 degrees of flexion.    The hip internally rotates to 10 degrees and externally rotates to 30 degrees.    Abduction is 30 degrees and adduction is 10 degrees.    There is groin and buttock pain with hip motion.    Right Hip Examination:    Examination of the hip reveals the skin to be intact.    There is mild tenderness over the greater trochanter.    There is no obvious swelling.    There is a mildly positive Stinchfield test. Patient did have recent steroid injection.     Range of motion is full extension to 90 degrees of flexion.    The hip internally rotates to 20 and externally rotates 40 degrees.    Abduction is 50 degrees and adduction is 20 degrees.    There is no groin and buttock pain with hip motion.    Right Knee Examination:  Examination of the right knee reveals the skin to be intact. There is no obvious swelling.    There is no tenderness to palpation.    Range of motion is full extension to 120 degrees of flexion.    The knee is stable.    There is no grinding with range of motion.    There is no patellofemoral crepitus.    Left Knee Examination:  Examination of the left knee reveals the skin to be intact. There is no obvious swelling.    There is no tenderness to palpation.    Range of motion is full extension  "to 120 degrees of flexion.    The knee is stable.    There is no grinding with range of motion.    There is no patellofemoral crepitus.    Prior Labs:   Lab Results   Component Value Date    WBC 4.0 (L) 12/14/2023    HGB 14.5 12/14/2023    HCT 43.7 12/14/2023    MCV 85 12/14/2023     (L) 12/14/2023      Lab Results   Component Value Date    INR 1.4 (H) 10/07/2019    INR 1.3 (H) 10/04/2019    INR 1.2 (H) 10/02/2019    PROTIME 15.8 (H) 10/07/2019    PROTIME 14.3 (H) 10/04/2019    PROTIME 13.5 (H) 10/02/2019         Lab Results   Component Value Date    GLUCOSE 94 12/14/2023    CALCIUM 9.1 12/14/2023     12/14/2023    K 4.4 12/14/2023    CO2 29 12/14/2023     12/14/2023    BUN 19 12/14/2023    CREATININE 0.98 12/14/2023      Lab Results   Component Value Date    CKTOTAL 30 04/21/2023    TROPONINI <0.02 08/02/2019      No results found for: \"HGBA1C\"      Radiographs:  Radiographs were personally reviewed today with the patient. There is evidence of severe BILATERAL hip osteoarthritis with bone on bone apposition.    Impression:  73 y.o. year old male presents with severe BILATERAL hip osteoarthritis (LEFT > right) with bone on bone apposition. This is now affecting activities of daily living. All appropriate nonsurgical management options have been tried and failed.    Diagnosis:   Primary osteoarthritis of left hip    Primary osteoarthritis of right hip    Recommendations / Plan:    I have discussed the options in detail with the patient. We have discussed anti-inflammatory medication, activity modification, physical therapy, corticosteroid injections, and total hip replacement surgery.    The risks and benefits of all these treatment options have been discussed in detail. The patient has tried at least 3 months of the above conservative treatments and continues to have disabling pain, impaired activities of daily living and worsened quality of life. The patient is a candidate for left total hip " replacement. We discussed anterior and posterolateral surgical approaches to the hip joint with my rationale for a anterior approach. Risks and benefits of all approaches were reviewed. We discussed expected rehabilitation, DVT prophylaxis, time points during recovery, likely functional outcomes and long-term issues with hip replacement procedures.    We discussed the hospital stay, postoperative rehabilitation and follow-up required as well as the potential risks of surgery including bleeding, infection, need for reoperation, failure of the operation to provide symptomatic relief, leg length discrepancy, need for multiple revision surgeries in the setting of bleeding, wear, infection, instability, dislocation requiring closed and/or open reduction and/or revision, damage to major neurovascular structures, venous thrombolic disease, complications of regional and/or general anesthesia, as well as death. The patient also understands that a good result is not guaranteed and that poor outcomes can at times be unavoidable. The patient may also have persistent and chronic pain that could require further intervention.  The patient confirms their understanding of the risks as well as the benefits of surgery.     We have reviewed the typical recovery after surgery and have discussed the fact that full recovery can take up to 1 year. Bearing surfaces were discussed in detail. The risks and benefits of all available bearing surfaces: metal on poly, Oxinium on poly, and dual mobility were discussed. Specifically, the risks of long-term wear and osteolysis were discussed. We also discussed the potential for metal hypersensitivity reactions that could potentially require further surgery. For anterior approach surgery, I specifically discussed the increased risk for intra-operative fracture, the risk of aseptic femoral failure, and the risk for lateral femoral cutaneous nerve injury.    The patient does not have any of the  following contraindications to arthroplasty including active infection of the hip joint, systemic bacteremia, active skin infection or an open wound at the surgical site, neuropathic arthritis or severe, rapidly progressive neurological disease.     Patient will consider proceeding with LEFT JAC. The patient understands he must wait 3 months from steroid injection prior to proceeding with right hip replacement surgery.  All questions were answered today. If the patient chooses to move forward with surgical scheduling, they will be enrolled in a preoperative arthroplasty teaching class and the pre-admission testing pathway. The patient was encouraged to contact their primary care physician and oncologist to discuss fitness for surgery.     Social support after surgery: wife    Pain medication plan: standard    Additional preoperative considerations: Oncology clearance given patient history of CLL with flare in 2019    Diagnosis: M16.12   Procedure: 62365 anterior   Surgery Location: [facility] 3/29    Equipment Requests: [facility]    Discharge: Overnight     _____________  Fanta Christianson MD   [facility]     Approximately 45 minutes were spent on the following tasks:              Preparing for the patient              Reviewing medical records              Taking a patient history              Performing a physical exam              Reviewing treatment options with the patient              Explaining the risks, potential benefits, and alternative to surgery  Explaining the expected rehabilitation after each treatment option  Explaining the potential long term expectations  Evaluating the diagnostic imaging   Templating pre-operative or current imaging  Performing surgical planning and booking     This office note was transcribed with dictation software.  Please excuse any typographical errors, program misunderstandings leading to inadvertent insertions or deletions of  inappropriate wording, pronoun errors and other unintentional transcription errors not noticed on proof-reading.

## 2024-03-15 ENCOUNTER — TELEPHONE (OUTPATIENT)
Dept: HEMATOLOGY/ONCOLOGY | Facility: HOSPITAL | Age: 74
End: 2024-03-15

## 2024-03-15 ENCOUNTER — HOSPITAL ENCOUNTER (OUTPATIENT)
Dept: RADIOLOGY | Facility: CLINIC | Age: 74
Discharge: HOME | End: 2024-03-15
Payer: MEDICARE

## 2024-03-15 ENCOUNTER — OFFICE VISIT (OUTPATIENT)
Dept: ORTHOPEDIC SURGERY | Facility: CLINIC | Age: 74
End: 2024-03-15
Payer: MEDICARE

## 2024-03-15 DIAGNOSIS — M16.12 PRIMARY OSTEOARTHRITIS OF LEFT HIP: ICD-10-CM

## 2024-03-15 DIAGNOSIS — M16.11 PRIMARY OSTEOARTHRITIS OF RIGHT HIP: ICD-10-CM

## 2024-03-15 DIAGNOSIS — M16.12 PRIMARY OSTEOARTHRITIS OF LEFT HIP: Primary | ICD-10-CM

## 2024-03-15 PROCEDURE — 77073 BONE LENGTH STUDIES: CPT

## 2024-03-15 PROCEDURE — 73502 X-RAY EXAM HIP UNI 2-3 VIEWS: CPT | Mod: LT

## 2024-03-15 PROCEDURE — 99204 OFFICE O/P NEW MOD 45 MIN: CPT | Performed by: STUDENT IN AN ORGANIZED HEALTH CARE EDUCATION/TRAINING PROGRAM

## 2024-03-15 PROCEDURE — 77073 BONE LENGTH STUDIES: CPT | Mod: LEFT SIDE | Performed by: RADIOLOGY

## 2024-03-15 PROCEDURE — 1159F MED LIST DOCD IN RCRD: CPT | Performed by: STUDENT IN AN ORGANIZED HEALTH CARE EDUCATION/TRAINING PROGRAM

## 2024-03-15 PROCEDURE — 99214 OFFICE O/P EST MOD 30 MIN: CPT | Mod: 57 | Performed by: STUDENT IN AN ORGANIZED HEALTH CARE EDUCATION/TRAINING PROGRAM

## 2024-03-15 PROCEDURE — 1036F TOBACCO NON-USER: CPT | Performed by: STUDENT IN AN ORGANIZED HEALTH CARE EDUCATION/TRAINING PROGRAM

## 2024-03-15 PROCEDURE — 72170 X-RAY EXAM OF PELVIS: CPT

## 2024-03-15 PROCEDURE — 73503 X-RAY EXAM HIP UNI 4/> VIEWS: CPT | Mod: LEFT SIDE | Performed by: RADIOLOGY

## 2024-03-15 PROCEDURE — 1157F ADVNC CARE PLAN IN RCRD: CPT | Performed by: STUDENT IN AN ORGANIZED HEALTH CARE EDUCATION/TRAINING PROGRAM

## 2024-03-18 PROBLEM — M16.12 UNILATERAL PRIMARY OSTEOARTHRITIS, LEFT HIP: Status: ACTIVE | Noted: 2024-03-29

## 2024-03-18 NOTE — TELEPHONE ENCOUNTER
Dr. Luciano requesting appointment for deeper discussion with patient. Spoke to the patient who is agreeable to virtual appointment on 3/21/24 at 140pm. Pt aware that virtual appointment may be later than scheduled time.

## 2024-03-19 ENCOUNTER — DOCUMENTATION (OUTPATIENT)
Dept: ORTHOPEDIC SURGERY | Facility: HOSPITAL | Age: 74
End: 2024-03-19

## 2024-03-19 ENCOUNTER — OFFICE VISIT (OUTPATIENT)
Dept: PRIMARY CARE | Facility: CLINIC | Age: 74
End: 2024-03-19
Payer: MEDICARE

## 2024-03-19 VITALS
DIASTOLIC BLOOD PRESSURE: 80 MMHG | WEIGHT: 189 LBS | HEART RATE: 64 BPM | BODY MASS INDEX: 24.41 KG/M2 | OXYGEN SATURATION: 99 % | SYSTOLIC BLOOD PRESSURE: 138 MMHG | TEMPERATURE: 97.6 F | RESPIRATION RATE: 16 BRPM

## 2024-03-19 DIAGNOSIS — M16.0 OSTEOARTHRITIS OF BOTH HIPS, UNSPECIFIED OSTEOARTHRITIS TYPE: Primary | ICD-10-CM

## 2024-03-19 PROCEDURE — 1036F TOBACCO NON-USER: CPT | Performed by: INTERNAL MEDICINE

## 2024-03-19 PROCEDURE — 3075F SYST BP GE 130 - 139MM HG: CPT | Performed by: INTERNAL MEDICINE

## 2024-03-19 PROCEDURE — 1159F MED LIST DOCD IN RCRD: CPT | Performed by: INTERNAL MEDICINE

## 2024-03-19 PROCEDURE — 3079F DIAST BP 80-89 MM HG: CPT | Performed by: INTERNAL MEDICINE

## 2024-03-19 PROCEDURE — 1157F ADVNC CARE PLAN IN RCRD: CPT | Performed by: INTERNAL MEDICINE

## 2024-03-19 PROCEDURE — 99214 OFFICE O/P EST MOD 30 MIN: CPT | Performed by: INTERNAL MEDICINE

## 2024-03-19 PROCEDURE — 1160F RVW MEDS BY RX/DR IN RCRD: CPT | Performed by: INTERNAL MEDICINE

## 2024-03-19 ASSESSMENT — ENCOUNTER SYMPTOMS: SHORTNESS OF BREATH: 0

## 2024-03-19 NOTE — PROGRESS NOTES
Fanta Christianson MD    Adult Reconstruction and Joint Replacement Surgery   Phone: [phone]     Fax: [phone]    Surgical Plan of Care       Patient: Rafael Walter                                                 MRN: [id number]   Diagnosis: Left hip arthritis       Disposition:   [] Inpatient    [x] AMB    [] AMB Same Day       Site:  [] [facility]  [x] [facility] (Time of day request: [] AM [] PM )      Case Complexity:  [x] 1  [] 2  [] 3        Laterality:  [x] LEFT  [] RIGHT  [] BILATERAL      Equipment:  [x] Princeton Table  [x] Large C-Arm  [] Automation Alley table  [] Flat-plate XR  [] Scar flat     Special Requests:    [x] Princeton table accessories, [email or web address] Cables in room, Midas Cylinder Latrobe 15 cm [id number], Midas elvin finger hand control with AS15 attachment         PROCEDURE(S):      Total Hip (20220):     Approach:  [x] Direct anterior  [] Posterior      [x] Depuy VHN: Emphasys Gription Acetabulum, Actis Femur    Back up: Depuy C-stem AMT       PREOPERATIVE       Tranexamic Acid: [x] IV []Topical        Preoperative Antibiotics: []Hold until intraop cultures obtained []Per ID note        Heme: []Lovenox Bridge         PREADMISSION      LABS:      [x] Routine (CBC, BMP, T+S, INR, EKG)  [] CMP   [] UA   [] Urine cotinine test      [] ESR      []CRP   [x] Hgba1c   [] Albumin   [] Fructosamine   [] PFTs      [] Type/Cross 1 unit  [] D-Dimer  [] Vit D    [] Rheum Factor   [] CCP Abs          IMAGING: Please make sure all imaging is done by PAT visit.      [] [facility]  [] [facility]  [] [facility] [] Outside:      [] CXR     [] XR Hip/Pelvis XR Knee         [] EOS Whole Body AP/Lat  [] EOS Hip to Ankle AP/Lat         [] EOS Pelvis AP/Lat (sitting)     [] XR Hip to Ankle AP  [] XR Pelvis Lat Sit/Stand        [] CT L[facility]ONNE MRI    [] CT BIOMET              [] MRA/CTA                                    [] Fluoro-guided aspiration [] BLE Venous ultrasound to r/o DVT      [] Shoulder       [] Elbow   [] C spine AP, flex/ext lat       []  Other:         CONSULTS:      [x] Medicine  [] Cardiac    [] Anesthesia  [] Weight Management      [] Pain Mgmt    [] Heme  [] Infectious Dis   [] Vascular   [] Plastic Surg  [] Pulm      [x] Other:  Oncologist         BLOOD PRODUCTS:       FFP units:  []Factor   []Autologous       DME / REHAB      [] Hinged Knee Brace  [] Knee Immobilizer  [] Philippon  [] Other      [x] Polar Wave Ice Machine  [x] Polar Ice Packs       [] Preop PT Evaluation        [x] In-home PT  [x] Outpatient PT  [x] Home Health Care          HOLD PREOP MEDS:      [] Plavix (7 days)   [] Xarelto (72 hr) [] Coumadin (7 days)       [] Pradaxa  (5 days)        [] Eliquis (72 hrs)  [] Effient (7days)                        DMARDs:   [] Enbrel(2wks)   [] Cimzia (4wks)   [] Humira(2wks) [] Kevzara(4wks)      [] Remicade (4 wks)       [] Orencia(4wks)      [] Xeljanz (2 days)    [] Symponi (4wks)      [] Other:          POSTOP ANTICOAGULATION   [] ASA 81 mg PO BID x 6 wks [] ASA 325mg PO BID x 6 wks             [] Xarelto      [x] Eliquis          [] Coumadin        [] Lovenox             HISTORY/RESULTS      Patient Active Problem List   Diagnosis    Atrophy of muscle of thigh    Muscular deconditioning    Benign enlargement of prostate    Benign essential hypertension    Bilateral knee pain    Bursitis of left hip    Chronic diarrhea    Chronic lymphocytic leukemia (CMS/HCC)    Gout    Hematoma of spleen, closed    Hematospermia    Hyperlipidemia, unspecified    Hypertension secondary to other renal disorders    Leg pain    Lower leg edema    Lung nodule, multiple    LVH (left ventricular hypertrophy) due to hypertensive disease    Male erectile disorder of organic origin    Muscle weakness (generalized)    Pain in joint involving pelvic region and thigh, bilateral    Panic attacks    Panic disorder without agoraphobia    Benign skin lesion of multiple sites    Psoriasis    Right inguinal hernia    Splenomegaly    Tubular adenoma of colon    Vitamin  "D deficiency, unspecified    Right hip pain    Primary osteoarthritis of right hip    Unilateral primary osteoarthritis, left hip         Current Outpatient Medications   Medication Instructions    ALPRAZolam (XANAX) 0.5 mg, oral    losartan (COZAAR) 50 mg, oral, Daily    naproxen (NAPROSYN) 500 mg, oral, 2 times daily PRN    sildenafil (VIAGRA) 50 mg, oral, Daily PRN, 1 HOUR BEFORE SEXUAL ACTIVITY         No Known Allergies      Lab Results   Component Value Date    WBC 4.0 (L) 12/14/2023    HGB 14.5 12/14/2023    HCT 43.7 12/14/2023    MCV 85 12/14/2023     (L) 12/14/2023      Lab Results   Component Value Date    INR 1.4 (H) 10/07/2019    INR 1.3 (H) 10/04/2019    INR 1.2 (H) 10/02/2019    PROTIME 15.8 (H) 10/07/2019    PROTIME 14.3 (H) 10/04/2019    PROTIME 13.5 (H) 10/02/2019         Lab Results   Component Value Date    GLUCOSE 94 12/14/2023    CALCIUM 9.1 12/14/2023     12/14/2023    K 4.4 12/14/2023    CO2 29 12/14/2023     12/14/2023    BUN 19 12/14/2023    CREATININE 0.98 12/14/2023      Lab Results   Component Value Date    CKTOTAL 30 04/21/2023    TROPONINI <0.02 08/02/2019      No results found for: \"HGBA1C\"          "

## 2024-03-19 NOTE — PROGRESS NOTES
Patient here for a left hip surgery clearance     Subjective   Patient ID: Rafael Walter is a 73 y.o. male who presents for Pre-op Exam.  He is generally doing well today.    The patient is scheduled for upcoming left total hip arthroplasty on 3/29/2024 with  from Orthopedic Surgery.  He is optimistic to undergo a similar procedure for right hip pain during the summer of 2024 as well.    The patient denies any dyspnea, chest pressure, or chest pain.      Review of Systems   Respiratory:  Negative for shortness of breath.    Cardiovascular:  Negative for chest pain.   Musculoskeletal:         Positive for bilateral hip pain.     Objective   Physical Exam  Constitutional:       Appearance: Normal appearance.   Neck:      Vascular: No carotid bruit.   Cardiovascular:      Rate and Rhythm: Normal rate and regular rhythm.      Heart sounds: Normal heart sounds.   Pulmonary:      Effort: Pulmonary effort is normal.      Breath sounds: Normal breath sounds.   Abdominal:      General: Bowel sounds are normal.      Palpations: Abdomen is soft.      Tenderness: There is no abdominal tenderness.   Skin:     General: Skin is warm and dry.   Neurological:      General: No focal deficit present.      Mental Status: He is alert and oriented to person, place, and time. Mental status is at baseline.   Psychiatric:         Mood and Affect: Mood normal.         Behavior: Behavior normal.         Assessment/Plan   Problem List Items Addressed This Visit    None        IMPRESSIONS/PLAN:     Left Hip Pain  - Scheduled for left total hip arthroplasty 3/29/2024 with  from Orthopedic Surgery.  Left Hip Xray 1/2024 showed no pelvic fracture identified. Severe degenerative changes of the right hip noted. No acute fracture or dislocation of the left hip. Severe joint space narrowing with advanced subchondral sclerosis, cyst, and osteophyte formation. Pelvic phleboliths are present.  Advised patient to be diligent with  Physical Therapy after the procedure.  Previously prescribed naproxen 500mg twice daily prn along with Tylenol 650mg one tablet at night time for severe symptoms.  Stop Meloxicam, and patient verbalized understanding.  Following with  and  from Orthopedic Surgery.  Patient is acceptable risk for surgery from Primary Care standpoint.     HTN   - /80. Continue Losartan 50 mg daily,     HLD  - Triglycerides high per 4/2023.     BPH/Nocturia   - Last PSA wnl 4/2023.       Left Knee Pain  - No pain over meniscus or patellar tendon.  Hip pain in inguinal region on straight leg raise test.  No calf pain.  Xray Left Knee 1/2024 showed mild degenerative changes.  Advised patient to continue with Physical Therapy.  Prescribed naproxen 500mg twice daily prn along with Tylenol 650mg one tablet at night time for severe symptoms.  Stop Meloxicam, and patient verbalized understanding.  Encouraged patient to follow up with  from Orthopedic Surgery pending results.      Lupus nephritis   - has seen nephrology / rheum     Chronic lymphocytic leukemia   - Following with Dr. Field in Oncology.     Physical Deconditioning  - Following with Physical Therapy.      Right Knee Pain  -Xray Right Knee 6/2023. Showed mild osteoarthritis.  Following with Orthopedic Surgery and Physical therapy.     Skin Lesion:  - Previously ordered referral to  in Dermatology.     Health Maintenance  -Routine labs 12/2023.  Last PSA wnl 4/2023.  Last Cologuard 2022, repeat due 2025. Advised patient to check with Hematology/Oncology before receivin Shingrix and RSV vaccines.       Follow up for annual physical examination, call sooner if needed.       Scribe Attestation  By signing my name below, I, Lyly Vazquez, Frank   attest that this documentation has been prepared under the direction and in the presence of Milo Gold DO.   Lyly Vazquez 03/19/24 9:08 AM

## 2024-03-20 RX ORDER — OXYCODONE HYDROCHLORIDE 5 MG/1
5-10 TABLET ORAL EVERY 6 HOURS PRN
Qty: 40 TABLET | Refills: 0 | Status: SHIPPED | OUTPATIENT
Start: 2024-03-20 | End: 2024-04-05

## 2024-03-20 RX ORDER — ONDANSETRON 4 MG/1
4 TABLET, FILM COATED ORAL EVERY 8 HOURS PRN
Qty: 20 TABLET | Refills: 0 | Status: SHIPPED | OUTPATIENT
Start: 2024-03-20

## 2024-03-20 RX ORDER — DOCUSATE SODIUM 100 MG/1
100 CAPSULE, LIQUID FILLED ORAL 2 TIMES DAILY
Qty: 30 CAPSULE | Refills: 0 | Status: SHIPPED | OUTPATIENT
Start: 2024-03-20 | End: 2024-04-13

## 2024-03-20 RX ORDER — ACETAMINOPHEN 500 MG
1000 TABLET ORAL EVERY 8 HOURS
Qty: 360 TABLET | Refills: 0 | Status: SHIPPED | OUTPATIENT
Start: 2024-03-20 | End: 2024-05-19

## 2024-03-20 RX ORDER — SENNOSIDES 8.6 MG/1
1 TABLET ORAL DAILY
Qty: 15 TABLET | Refills: 0 | Status: SHIPPED | OUTPATIENT
Start: 2024-03-20 | End: 2024-04-13

## 2024-03-20 RX ORDER — PANTOPRAZOLE SODIUM 40 MG/1
40 TABLET, DELAYED RELEASE ORAL
Qty: 30 TABLET | Refills: 0 | Status: SHIPPED | OUTPATIENT
Start: 2024-03-20 | End: 2024-04-28

## 2024-03-20 RX ORDER — TRAMADOL HYDROCHLORIDE 50 MG/1
50-100 TABLET ORAL EVERY 6 HOURS PRN
Qty: 40 TABLET | Refills: 0 | Status: SHIPPED | OUTPATIENT
Start: 2024-03-20 | End: 2024-04-05

## 2024-03-20 RX ORDER — CEFADROXIL 500 MG/1
500 CAPSULE ORAL 2 TIMES DAILY
Qty: 14 CAPSULE | Refills: 0 | Status: SHIPPED | OUTPATIENT
Start: 2024-03-20 | End: 2024-04-05

## 2024-03-20 RX ORDER — NAPROXEN SODIUM 220 MG/1
81 TABLET, FILM COATED ORAL 2 TIMES DAILY
Qty: 3 TABLET | Refills: 0 | Status: SHIPPED | OUTPATIENT
Start: 2024-03-20 | End: 2024-03-31

## 2024-03-20 NOTE — DISCHARGE INSTRUCTIONS
Fanta Christianson MD   Adult Reconstruction and Joint Replacement Surgery  Office: [phone]     Fax: [phone]       Total Hip Arthroplasty   Postoperative Instructions    CONTACT INFORMATION  Fanta Christianson MD  Joint Replacement Surgeon   Ruth Mendez      [phone]     Adina Anne MBA, BSN, RN-BC  Ortho Coordinator [facility]  [phone]    Yovani Peralta RN, BSN  Ortho Coordinator [facility]  [phone]    Mojgan Ruggiero BSN-BC  Ortho Nurse Navigator  [phone]    DRIVING AFTER SURGERY   Please discuss post-surgical, long-distance travel with your surgeon. You may not drive until you are off narcotics and cleared by your surgical team.     WOUND CARE   A waterproof dressing was placed over your surgical site. You may shower over this dressing after surgery and pat it dry. Please do not scrub, soak, or submerge your surgical site for at least three weeks after surgery to allow your incision to heal. Avoid using creams and ointments around your surgical site while it is healing.    Your dressing will be removed on post-operative day 7 by your physical therapist. You may leave the incision open to air after the bandage has been removed. Please do not submerge your incision in a hot tub/pool/lake/etc. until cleared by your surgeon. Please contact the office if there are any concerns with your wound.     Pain, swelling, and bruising are normal after total hip replacement surgery. You may alleviate these symptoms by following the post-operative pain regimen that was prescribed, icing your surgical site multiple times a day, and elevating your extremity throughout the day.     ACTIVITY AND HIP PRECAUTIONS  Please follow the post-operative activity and hip precautions that were described to you by your surgical team and physical therapists.    Weightbearing restriction: weightbearing as tolerated     If you had anterior total hip replacement surgery, please avoid  excessive hip extension and external rotation.    If you had posterior total hip replacement surgery, please avoid hip flexion greater than 90 degrees, adduction past midline, and internal rotation beyond neutral.    DISCHARGE MEDICATIONS  Pain  Please take the pain medications that were prescribed to you according to the prescribed schedule. You may not operate a motor vehicle while taking narcotic medications (e.g. Oxycodone, Tramadol).     Please take Tylenol and NSAIDs (if you are able) on a schedule as discussed in clinic. Please take the prescribed Pantoprazole to protect your stomach from ulceration after surgery while taking NSAIDs.     Please wean off the narcotic pain medications as soon as you are able.     Blood-Thinners  Please take the blood thinning medications that were prescribed according to the instructions from your surgeon. These are typically continued for 6 weeks postoperatively. This schedule may differ if you were already on blood-thinning medication prior to your surgery.     Nausea  You may feel nauseous after surgery due to the anesthetics or pain medications you are taking. You may take anti-nausea medication (e.g. Ondansetron) to help with these symptoms.     Stool Softeners   Please take the stool softeners that were prescribed at discharge (e.g. Colace, Senna) while you are on narcotic pain medications to minimize your risk of constipation.    Home Medications   You may restart your home medications at time of discharge according to your discharge instructions.    PHYSICAL THERAPY AND OCCUPATIONAL THERAPY   In-home physical therapy and occupational therapy will start within a few days of your discharge to home. You will transition to outpatient physical therapy around 2-3 weeks after your surgery.     FOLLOW-UP  You will see your surgical team around 2 weeks after surgery for a wound check (unless otherwise arranged) and between 4-6 weeks after surgery for X-rays and clinical  evaluation.    CALL YOUR SURGEON IF YOU HAVE:   Drainage that is not contained by your surgical dressing.  Redness, pain or swelling in your calf.   Severe pain that is not controlled by the pain regimen prescribed.   Fever >101 Fahrenheit presents for at least 48 hours or other signs of infection (wound drainage, redness around your surgical incision, increased joint pain)  Go to the emergency department or call 911 if you experience chest pain, shortness of breath or other emergent symptoms. Do not call your surgeon first.     ANTIBIOTIC PROPHYLAXIS AFTER JOINT REPLACEMENT SURGERY   Although it is a rare occurrence, an artificial joint can become infected by the bloodstream carrying infection from another part of the body to the replaced joint.  Therefore, it is important that any bacterial infection be treated promptly. If you are unsure of whether you need to take antibiotics, please call the office before taking any medication.  We advise that you take antibiotics prior to the following invasive procedures for a lifetime. Please wait at least 3-6 months after joint replacement surgery before undergoing dental work or cleaning.    Please take antibiotics one hour before any of the following procedures:  Routine dental cleaning or dental procedure  Root canal  Podiatry procedures that involve cutting into the skin  Dermatologic procedures that involve cutting into the skin.  (Not required for laser or freezing of skin)  Invasive procedures regarding the urinary tract     Antibiotics are not required for the following procedures:   Manicures/Pedicures  Colonoscopy/Endoscopy/Sigmoidoscopy  Routine gynecologic exams/procedures/PAP smears, D&C's  Cataract/laser eye surgery  Injection or blood work  Routine colds and flu and minor cuts and bruises    You may have a flu shot at any time following your surgery.

## 2024-03-21 ENCOUNTER — TELEMEDICINE (OUTPATIENT)
Dept: HEMATOLOGY/ONCOLOGY | Facility: HOSPITAL | Age: 74
End: 2024-03-21
Payer: MEDICARE

## 2024-03-21 DIAGNOSIS — C91.10 CHRONIC LYMPHOCYTIC LEUKEMIA (MULTI): Primary | ICD-10-CM

## 2024-03-21 DIAGNOSIS — M25.552 LEFT HIP PAIN: ICD-10-CM

## 2024-03-21 PROCEDURE — 1160F RVW MEDS BY RX/DR IN RCRD: CPT | Performed by: INTERNAL MEDICINE

## 2024-03-21 PROCEDURE — 1157F ADVNC CARE PLAN IN RCRD: CPT | Performed by: INTERNAL MEDICINE

## 2024-03-21 PROCEDURE — 1159F MED LIST DOCD IN RCRD: CPT | Performed by: INTERNAL MEDICINE

## 2024-03-21 PROCEDURE — 99213 OFFICE O/P EST LOW 20 MIN: CPT | Performed by: INTERNAL MEDICINE

## 2024-03-21 RX ORDER — NAPROXEN 500 MG/1
500 TABLET ORAL 2 TIMES DAILY PRN
Qty: 60 TABLET | Refills: 0 | Status: SHIPPED | OUTPATIENT
Start: 2024-03-21 | End: 2024-04-18

## 2024-03-22 ENCOUNTER — TELEMEDICINE CLINICAL SUPPORT (OUTPATIENT)
Dept: PREADMISSION TESTING | Facility: HOSPITAL | Age: 74
End: 2024-03-22
Payer: MEDICARE

## 2024-03-22 DIAGNOSIS — M16.12 UNILATERAL PRIMARY OSTEOARTHRITIS, LEFT HIP: ICD-10-CM

## 2024-03-22 RX ORDER — DIPHENHYDRAMINE HCL 25 MG
25 TABLET ORAL NIGHTLY PRN
COMMUNITY

## 2024-03-22 RX ORDER — VIT C/E/ZN/COPPR/LUTEIN/ZEAXAN 250MG-90MG
25 CAPSULE ORAL DAILY
COMMUNITY

## 2024-03-26 ENCOUNTER — HOSPITAL ENCOUNTER (OUTPATIENT)
Dept: CARDIOLOGY | Facility: HOSPITAL | Age: 74
Discharge: HOME | End: 2024-03-26
Payer: MEDICARE

## 2024-03-26 ENCOUNTER — LAB (OUTPATIENT)
Dept: LAB | Facility: LAB | Age: 74
End: 2024-03-26
Payer: MEDICARE

## 2024-03-26 ENCOUNTER — PRE-ADMISSION TESTING (OUTPATIENT)
Dept: PREADMISSION TESTING | Facility: HOSPITAL | Age: 74
End: 2024-03-26
Payer: MEDICARE

## 2024-03-26 VITALS
HEIGHT: 74 IN | DIASTOLIC BLOOD PRESSURE: 83 MMHG | TEMPERATURE: 96.3 F | RESPIRATION RATE: 18 BRPM | HEART RATE: 68 BPM | OXYGEN SATURATION: 100 % | WEIGHT: 184.3 LBS | BODY MASS INDEX: 23.65 KG/M2 | SYSTOLIC BLOOD PRESSURE: 153 MMHG

## 2024-03-26 DIAGNOSIS — Z01.818 PREPROCEDURAL EXAMINATION: ICD-10-CM

## 2024-03-26 DIAGNOSIS — R16.1 SPLENOMEGALY: Primary | ICD-10-CM

## 2024-03-26 DIAGNOSIS — R16.1 SPLENOMEGALY: ICD-10-CM

## 2024-03-26 LAB
ABO GROUP (TYPE) IN BLOOD: NORMAL
ALBUMIN SERPL BCP-MCNC: 4.3 G/DL (ref 3.4–5)
ALP SERPL-CCNC: 80 U/L (ref 33–136)
ALT SERPL W P-5'-P-CCNC: 12 U/L (ref 10–52)
ANION GAP SERPL CALC-SCNC: 9 MMOL/L (ref 10–20)
ANTIBODY SCREEN: NORMAL
AST SERPL W P-5'-P-CCNC: 15 U/L (ref 9–39)
BASOPHILS # BLD AUTO: 0.03 X10*3/UL (ref 0–0.1)
BASOPHILS NFR BLD AUTO: 0.6 %
BILIRUB SERPL-MCNC: 0.6 MG/DL (ref 0–1.2)
BUN SERPL-MCNC: 21 MG/DL (ref 6–23)
CALCIUM SERPL-MCNC: 8.8 MG/DL (ref 8.6–10.3)
CHLORIDE SERPL-SCNC: 105 MMOL/L (ref 98–107)
CO2 SERPL-SCNC: 29 MMOL/L (ref 21–32)
CREAT SERPL-MCNC: 0.88 MG/DL (ref 0.5–1.3)
EGFRCR SERPLBLD CKD-EPI 2021: 90 ML/MIN/1.73M*2
EOSINOPHIL # BLD AUTO: 0.04 X10*3/UL (ref 0–0.4)
EOSINOPHIL NFR BLD AUTO: 0.8 %
ERYTHROCYTE [DISTWIDTH] IN BLOOD BY AUTOMATED COUNT: 14.6 % (ref 11.5–14.5)
GLUCOSE SERPL-MCNC: 94 MG/DL (ref 74–99)
HCT VFR BLD AUTO: 45.1 % (ref 41–52)
HGB BLD-MCNC: 14.5 G/DL (ref 13.5–17.5)
IMM GRANULOCYTES # BLD AUTO: 0.01 X10*3/UL (ref 0–0.5)
IMM GRANULOCYTES NFR BLD AUTO: 0.2 % (ref 0–0.9)
LYMPHOCYTES # BLD AUTO: 1.26 X10*3/UL (ref 0.8–3)
LYMPHOCYTES NFR BLD AUTO: 24.5 %
MCH RBC QN AUTO: 27.5 PG (ref 26–34)
MCHC RBC AUTO-ENTMCNC: 32.2 G/DL (ref 32–36)
MCV RBC AUTO: 86 FL (ref 80–100)
MONOCYTES # BLD AUTO: 0.59 X10*3/UL (ref 0.05–0.8)
MONOCYTES NFR BLD AUTO: 11.5 %
NEUTROPHILS # BLD AUTO: 3.21 X10*3/UL (ref 1.6–5.5)
NEUTROPHILS NFR BLD AUTO: 62.4 %
NRBC BLD-RTO: 0 /100 WBCS (ref 0–0)
PLATELET # BLD AUTO: 131 X10*3/UL (ref 150–450)
POTASSIUM SERPL-SCNC: 3.4 MMOL/L (ref 3.5–5.3)
PROT SERPL-MCNC: 7.5 G/DL (ref 6.4–8.2)
RBC # BLD AUTO: 5.27 X10*6/UL (ref 4.5–5.9)
RH FACTOR (ANTIGEN D): NORMAL
SODIUM SERPL-SCNC: 140 MMOL/L (ref 136–145)
WBC # BLD AUTO: 5.1 X10*3/UL (ref 4.4–11.3)

## 2024-03-26 PROCEDURE — 80053 COMPREHEN METABOLIC PANEL: CPT

## 2024-03-26 PROCEDURE — 86901 BLOOD TYPING SEROLOGIC RH(D): CPT

## 2024-03-26 PROCEDURE — 85025 COMPLETE CBC W/AUTO DIFF WBC: CPT

## 2024-03-26 PROCEDURE — 86900 BLOOD TYPING SEROLOGIC ABO: CPT

## 2024-03-26 PROCEDURE — 86850 RBC ANTIBODY SCREEN: CPT

## 2024-03-26 PROCEDURE — 93005 ELECTROCARDIOGRAM TRACING: CPT

## 2024-03-26 PROCEDURE — 36415 COLL VENOUS BLD VENIPUNCTURE: CPT

## 2024-03-26 PROCEDURE — 99204 OFFICE O/P NEW MOD 45 MIN: CPT | Performed by: NURSE PRACTITIONER

## 2024-03-26 PROCEDURE — 87081 CULTURE SCREEN ONLY: CPT | Mod: AHULAB | Performed by: STUDENT IN AN ORGANIZED HEALTH CARE EDUCATION/TRAINING PROGRAM

## 2024-03-26 RX ORDER — CHLORHEXIDINE GLUCONATE ORAL RINSE 1.2 MG/ML
15 SOLUTION DENTAL DAILY
Qty: 30 ML | Refills: 0 | Status: SHIPPED | OUTPATIENT
Start: 2024-03-26 | End: 2024-03-30 | Stop reason: HOSPADM

## 2024-03-26 ASSESSMENT — ENCOUNTER SYMPTOMS
BRUISES/BLEEDS EASILY: 1
CARDIOVASCULAR NEGATIVE: 1
CONSTITUTIONAL NEGATIVE: 1
RESPIRATORY NEGATIVE: 1
GASTROINTESTINAL NEGATIVE: 1
ARTHRALGIAS: 1
NECK NEGATIVE: 1

## 2024-03-26 NOTE — PREPROCEDURE INSTRUCTIONS
Medication List            Accurate as of March 26, 2024  8:17 AM. Always use your most recent med list.                acetaminophen 500 mg tablet  Commonly known as: Tylenol  Take 2 tablets (1,000 mg) by mouth every 8 hours.  Notes to patient: TAKE IF NEEDED     ALPRAZolam 0.5 mg tablet  Commonly known as: Xanax  Medication Adjustments for Surgery: Take morning of surgery with sip of water, no other fluids     apixaban 2.5 mg tablet  Commonly known as: Eliquis  Take 1 tablet (2.5 mg) by mouth 2 times a day for 35 doses. Start morning of postoperative day 2 and continue for 5 weeks  Notes to patient: NOT TAKING     cefadroxil 500 mg capsule  Commonly known as: Duricef  Take 1 capsule (500 mg) by mouth 2 times a day for 7 days.  Notes to patient: NOT TAKING      chlorhexidine 0.12 % solution  Commonly known as: Peridex  Use 15 mL in the mouth or throat once daily for 2 days.  Notes to patient: Use as directed      diphenhydrAMINE 25 mg tablet  Commonly known as: Sominex  Medication Adjustments for Surgery: Continue until night before surgery     docusate sodium 100 mg capsule  Commonly known as: Colace  Take 1 capsule (100 mg) by mouth 2 times a day for 15 days.  Medication Adjustments for Surgery: Continue until night before surgery     losartan 50 mg tablet  Commonly known as: Cozaar  Take 1 tablet (50 mg) by mouth once daily.  Medication Adjustments for Surgery: Continue until night before surgery     naproxen 500 mg tablet  Commonly known as: Naprosyn  TAKE 1 TABLET (500 MG) BY MOUTH 2 TIMES A DAY AS NEEDED FOR MILD PAIN (1 - 3) (PAIN).  Medication Adjustments for Surgery: Stop 7 days before surgery     ondansetron 4 mg tablet  Commonly known as: Zofran  Take 1 tablet (4 mg) by mouth every 8 hours if needed for nausea or vomiting.  Notes to patient: Not taking      oxyCODONE 5 mg immediate release tablet  Commonly known as: Roxicodone  Take 1-2 tablets (5-10 mg) by mouth every 6 hours if needed for severe  pain (7 - 10) for up to 7 days.  Notes to patient: NOT TAKING     pantoprazole 40 mg EC tablet  Commonly known as: ProtoNix  Take 1 tablet (40 mg) by mouth once daily in the morning. Take before meals. Do not crush, chew, or split.  Medication Adjustments for Surgery: Take morning of surgery with sip of water, no other fluids     sennosides 8.6 mg tablet  Commonly known as: Senokot  Take 1 tablet (8.6 mg) by mouth once daily for 15 days.     sildenafil 50 mg tablet  Commonly known as: Viagra  Take 1 tablet (50 mg) by mouth once daily as needed for erectile dysfunction. 1 HOUR BEFORE SEXUAL ACTIVITY  Medication Adjustments for Surgery: Stop 2 days before surgery     traMADol 50 mg tablet  Commonly known as: Ultram  Take 1-2 tablets ( mg) by mouth every 6 hours if needed for severe pain (7 - 10) for up to 7 days.  Notes to patient: Not taking     Vitamin D3 25 MCG (1000 UT) capsule  Generic drug: cholecalciferol  Medication Adjustments for Surgery: Continue until night before surgery                 CONTACT SURGEON'S OFFICE IF YOU DEVELOP:  * Fever = 100.4 F   * New respiratory symptoms (e.g. cough, shortness of breath, respiratory distress, sore throat)  * Recent loss of taste or smell  *Flu like symptoms such as headache, fatigue or gastrointestinal symptoms  * You develop any open sores, shingles, burning or painful urination   AND/OR:  * You no longer wish to have the surgery.  * Any other personal circumstances change that may lead to the need to cancel or defer this surgery.  *You were admitted to any hospital within one week of your planned procedure.    SMOKING:  *Quitting smoking can make a huge difference to your health and recovery from surgery.    *If you need help with quitting, call 5-264-QUIT-NOW.    THE DAY BEFORE SURGERY:  *Do not eat any food after midnight the night before surgery.   *You are permitted to drink clear liquids (i.e. water, black coffee, tea, clear broth, apple juice) up to 2  hours before your surgery.  DIABETICS:  Please check fasting blood sugar  upon waking up.  If fasting sugar is <80 mg/dl, please drink 100ml/3oz of apple juice no later than 2 hours prior to surgery.      SURGICAL TIME  *You will be contacted between 2 p.m. and 6 p.m. the business day before your surgery with your arrival time.  *If you haven't received a call by 6pm, call [phone].  *Scheduled surgery times may change and you will be notified if this occurs-check your personal voicemail for any updates.    ON THE MORNING OF SURGERY:  *Wear comfortable, loose fitting clothing.   *Do not use moisturizers, creams, lotions or perfume.  *All jewelry and valuables should be left at home.  *Prosthetic devices such as contact lenses, hearing aids, dentures, eyelash extensions, hairpins and body piercing must be removed before surgery.    BRING WITH YOU:  *Photo ID and insurance card  *Current list of medicines and allergies  *Pacemaker/Defibrillator/Heart stent cards  *CPAP machine and mask  *Slings/splints/crutches  *Copy of your complete Advanced Directive/DHPOA-if applicable  *Neurostimulator implant remote    PARKING AND ARRIVAL:  *Check in at the Main Entrance desk and let them know you are here for surgery.  *You will be directed to the 2nd floor surgical waiting area.    AFTER OUTPATIENT SURGERY:  *A responsible adult MUST accompany you at the time of discharge and stay with you for 24 hours after your surgery.  *You may NOT drive yourself home after surgery.  *You may use a taxi or ride sharing service ([employer], Uber) to return home ONLY if you are accompanied by a friend or family member.  *Instructions for resuming your medications will be provided by your surgeon.    YOU HAVE REVIEWED THE MEDICATIONS ON THIS SHEET AND YOU VERIFY THESE ARE ALL THE MEDICATIONS AND OVER THE COUNTER MEDICATIONS THAT YOU TAKE .

## 2024-03-26 NOTE — H&P (VIEW-ONLY)
CPM/PAT Evaluation       Name: Rafael Walter (Rafael Walter)  /Age: 1950/73 y.o.     SURGEON :DR JANAY ORELLANA   Surgery, Date, and Length:  Left Hip Total Arthroplasty ~  Anterior Approach  3/29/24  HPI:  This a  73 y.o. male who presents for presurgical evaluation for for above mentioned procedure.Pt reports chronic left hip pain. Imaging shows degenerative osteoarthritis. . After discussion of the risks and benefits with   the patient elects to proceed with the planned procedure.       Past Medical History:   Diagnosis Date    Abnormal finding of blood chemistry, unspecified 2012    Abnormal blood chemistry    Anxiety     BPH (benign prostatic hyperplasia)     Claustrophobia     unable to lay flat    CLL (chronic lymphocytic leukemia) (CMS/HCC)     in remission, off oral meds x 2 years, last Hem/Onc visit 23-stable    Gout     History of splenomegaly     resolved per patient    Hyperlipidemia     Hypertension     Long-term current use of intravenous immunoglobulin (IVIG)     next treatment 3/25/24    Lung nodule     Lupus nephritis (CMS/[facility])     Osteoarthritis     Skin cancer 10/19/2014    left upper face    Vitamin D deficiency        Past Surgical History:   Procedure Laterality Date    BONE MARROW BIOPSY      2019, 2020    COLONOSCOPY  2012    Complete Colonoscopy    RENAL BIOPSY  05/10/2019    UMBILICAL HERNIA REPAIR         Anesthesia History  Pt denies any past history of anesthetic complications such as PONV, awareness, prolonged sedation, dental damage, aspiration, cardiac arrest, difficult intubation, difficult I.V. access or unexpected hospital admissions.  NO malignant hyperthermia and or pseudo cholinesterase deficiency.    The patient is not  a Taoist and will accept blood and blood products if medically indicated.   No history of blood transfusions .Type and screen not sent.     Social History  Social History     Substance and  Sexual Activity   Drug Use Never      Social History     Substance and Sexual Activity   Alcohol Use Not Currently      Social History     Tobacco Use   Smoking Status Never   Smokeless Tobacco Never          Family History   Problem Relation Name Age of Onset    Other (parkinson) Mother      Pulmonary embolism Father      Arthritis Sister      Cancer Sister      Prostate cancer Brother         No Known Allergies    Prior to Admission medications    Medication Sig Start Date End Date Taking? Authorizing Provider   acetaminophen (Tylenol) 500 mg tablet Take 2 tablets (1,000 mg) by mouth every 8 hours. 3/20/24 5/19/24  Fanta Christianson MD   ALPRAZolam (Xanax) 0.5 mg tablet Take 1 tablet (0.5 mg) by mouth once daily as needed. 11/14/22   Historical Provider, MD   apixaban (Eliquis) 2.5 mg tablet Take 1 tablet (2.5 mg) by mouth 2 times a day for 35 doses. Start morning of postoperative day 2 and continue for 5 weeks 3/20/24 4/7/24  Fanta Christianson MD   aspirin 81 mg chewable tablet Chew 1 tablet (81 mg) 2 times a day for 3 doses. Start postoperative day 0 and continue for 3 doses 3/20/24 3/22/24  Fanta Christianson MD   cefadroxil (Duricef) 500 mg capsule Take 1 capsule (500 mg) by mouth 2 times a day for 7 days. 3/20/24 3/27/24  Fanta Christianson MD   chlorhexidine (Peridex) 0.12 % solution Use 15 mL in the mouth or throat once daily for 2 days. 3/26/24 3/28/24  CAPRI Gallagher-CNP   cholecalciferol (Vitamin D3) 25 MCG (1000 UT) capsule Take 1 capsule (25 mcg) by mouth once daily.    Historical Provider, MD   diphenhydrAMINE (Sominex) 25 mg tablet Take 1 tablet (25 mg) by mouth as needed at bedtime for sleep.    Historical Provider, MD   docusate sodium (Colace) 100 mg capsule Take 1 capsule (100 mg) by mouth 2 times a day for 15 days. 3/20/24 4/4/24  Fanta Christianson MD   losartan (Cozaar) 50 mg tablet Take 1 tablet (50 mg) by mouth once daily. 6/5/23   Milo Gold DO   naproxen (Naprosyn) 500 mg  tablet TAKE 1 TABLET (500 MG) BY MOUTH 2 TIMES A DAY AS NEEDED FOR MILD PAIN (1 - 3) (PAIN). 3/21/24 6/19/24  Milo Gold DO   ondansetron (Zofran) 4 mg tablet Take 1 tablet (4 mg) by mouth every 8 hours if needed for nausea or vomiting. 3/20/24   Fanta Christianson MD   oxyCODONE (Roxicodone) 5 mg immediate release tablet Take 1-2 tablets (5-10 mg) by mouth every 6 hours if needed for severe pain (7 - 10) for up to 7 days. 3/20/24 3/27/24  Fanta Christianson MD   pantoprazole (ProtoNix) 40 mg EC tablet Take 1 tablet (40 mg) by mouth once daily in the morning. Take before meals. Do not crush, chew, or split. 3/20/24 4/19/24  Fanta Christianson MD   sennosides (Senokot) 8.6 mg tablet Take 1 tablet (8.6 mg) by mouth once daily for 15 days. 3/20/24 4/4/24  Fanta Christianson MD   sildenafil (Viagra) 50 mg tablet Take 1 tablet (50 mg) by mouth once daily as needed for erectile dysfunction. 1 HOUR BEFORE SEXUAL ACTIVITY 11/20/23   Milo Gold DO   traMADol (Ultram) 50 mg tablet Take 1-2 tablets ( mg) by mouth every 6 hours if needed for severe pain (7 - 10) for up to 7 days. 3/20/24 3/27/24  Fanta Christianson MD   naproxen (Naprosyn) 500 mg tablet TAKE 1 TABLET (500 MG) BY MOUTH 2 TIMES A DAY AS NEEDED FOR MILD PAIN (1 - 3) (PAIN). 2/19/24 3/21/24  Milo Gold DO        PAT ROS:   Constitutional:   neg    Neuro/Psych:   Eyes:   Ears:   Nose:   Mouth:   Throat:   neg    Neck:   neg    Cardio:   neg    Respiratory:   neg    Endocrine:   GI:   neg    :   neg    Musculoskeletal:    arthralgias (left hip)  Hematologic:    bruises/bleeds easily  Skin:  neg        Physical Exam  Vitals reviewed.   Constitutional:       Appearance: Normal appearance.   HENT:      Head: Normocephalic.      Mouth/Throat:      Mouth: Mucous membranes are dry.   Eyes:      Extraocular Movements: Extraocular movements intact.      Pupils: Pupils are equal, round, and reactive to light.   Cardiovascular:      Rate and Rhythm:  "Normal rate and regular rhythm.      Pulses: Normal pulses.      Heart sounds: Normal heart sounds.   Pulmonary:      Effort: Pulmonary effort is normal.      Breath sounds: Normal breath sounds.   Musculoskeletal:         General: Normal range of motion.   Skin:     General: Skin is warm and dry.   Neurological:      General: No focal deficit present.      Mental Status: He is alert and oriented to person, place, and time.   Psychiatric:         Mood and Affect: Mood normal.         Behavior: Behavior normal.          PAT AIRWAY:   Airway:     Mallampati::  IV   upper dentures and lower dentures    /83   Pulse 68   Temp 35.7 °C (96.3 °F)   Resp 18   Ht 1.886 m (6' 2.25\")   Wt 83.6 kg (184 lb 4.9 oz)   SpO2 100%   BMI 23.50 kg/m²     EKG IN EPIC     Lab Results   Component Value Date    WBC 5.1 03/26/2024    HGB 14.5 03/26/2024    HCT 45.1 03/26/2024    MCV 86 03/26/2024     (L) 03/26/2024     Results from last 7 days   Lab Units 03/26/24  0907   SODIUM mmol/L 140   POTASSIUM mmol/L 3.4*   CHLORIDE mmol/L 105   CO2 mmol/L 29   BUN mg/dL 21   CREATININE mg/dL 0.88   CALCIUM mg/dL 8.8   PROTEIN TOTAL g/dL 7.5   BILIRUBIN TOTAL mg/dL 0.6   ALK PHOS U/L 80   ALT U/L 12   AST U/L 15   GLUCOSE mg/dL 94       ASSESSMENT/PLAN    Patient is a 73 year-old  scheduled for Left Hip Total Arthroplasty ~  Anterior Approach  with Dr. Christianson   on  3/29/24 .  CARDIOVASCULAR:  RCRI score / Risk: The patients score is 0 based on history . Per ACC/AHA guidelines this places him  at  3.9% risk for MACE undergoing a intermediate  risk procedure . The patient has the following risk factors: DENIES   Functional Capacity: The patients exercise tolerance is  4  METS. This is based on the patients ability t shovel snow and push mow . Patient denies  active cardiac symptoms or anginal equivalents .      PULMONARY:  The patient has the following factors that place them at increased risk of perioperative pulmonary " complications;age greater than 65//poor functional status/greater than 1.5 hour procedure.  Postoperatively the patient would benefit from early pulmonary toilet/incentive spirometry q 1-2 hours while awake/pulse oximetry/cautious use of respiratory depressant medications such as opioids/elevate the HOB/oral hygiene.    CLL:  History of CLL diagnosed 2014 .Treated in 2019. June 2020 WBC WNL. Remains stable     HYPOGAMMOGLOBINEMIA:  Pt immune deficient. Receives monthly IVIG  Pt is considered immunodeficient and at increase risk for infection/sepsis.  Recommendations :Patient needs preoperative antibiotic prophylaxis.  Instruct patient regarding signs and symptoms of infection and to report to appropriate provider.    HX HEP B :  Hepatitis B core positive. Treated with tenovir. LFT's within normal limits .Liver normal size .      THROMBOPENIA:  The patient’s platelet count came back at  131.  .  Upon trending this is chronic for this patient.The patient has  abnormal skin bruising, of the hands .  Type and screen sent .  BPH:  Pt is on (alpha 5- reductase inhibitors) Recommendations: continue throughout perioperative period, monitior for urinary retention, avoid barakat catheter if able..      DVT:  CAPRINI SCORE=  The patient has the following factors that increase his  Risk for thrombus formation ; Virchow's triad ,age 73,  TJR , Surgical procedure >2 hrs  procedure .    Recommendations: DVT prophylaxis  per Dr. Christianson protocol . SCD's, BILLY's, and early ambulation are recommended. Heparin or LMWH is recommended for the very high risk .      Risk assessment complete.  Patient is scheduled for  intermediate  surgical risk procedure.  Patient is considered an acceptable  risk to proceed with the planned procedure.      Preoperative medication instructions were provided and reviewed with the patient.  Any additional testing or evaluation was explained to the patient.  Nothing by mouth instructions were discussed and  patient's questions were answered prior to conclusion to this encounter.  Patient verbalized understanding of preoperative instructions given in preadmission testing; discharge instructions available in EMR.

## 2024-03-26 NOTE — CPM/PAT H&P
CPM/PAT Evaluation       Name: Rafael Walter (Rafael Walter)  /Age: 1950/73 y.o.     SURGEON :DR JANAY ORELLANA   Surgery, Date, and Length:  Left Hip Total Arthroplasty ~  Anterior Approach  3/29/24  HPI:  This a  73 y.o. male who presents for presurgical evaluation for for above mentioned procedure.Pt reports chronic left hip pain. Imaging shows degenerative osteoarthritis. . After discussion of the risks and benefits with   the patient elects to proceed with the planned procedure.       Past Medical History:   Diagnosis Date    Abnormal finding of blood chemistry, unspecified 2012    Abnormal blood chemistry    Anxiety     BPH (benign prostatic hyperplasia)     Claustrophobia     unable to lay flat    CLL (chronic lymphocytic leukemia) (CMS/HCC)     in remission, off oral meds x 2 years, last Hem/Onc visit 23-stable    Gout     History of splenomegaly     resolved per patient    Hyperlipidemia     Hypertension     Long-term current use of intravenous immunoglobulin (IVIG)     next treatment 3/25/24    Lung nodule     Lupus nephritis (CMS/[facility])     Osteoarthritis     Skin cancer 10/19/2014    left upper face    Vitamin D deficiency        Past Surgical History:   Procedure Laterality Date    BONE MARROW BIOPSY      2019, 2020    COLONOSCOPY  2012    Complete Colonoscopy    RENAL BIOPSY  05/10/2019    UMBILICAL HERNIA REPAIR         Anesthesia History  Pt denies any past history of anesthetic complications such as PONV, awareness, prolonged sedation, dental damage, aspiration, cardiac arrest, difficult intubation, difficult I.V. access or unexpected hospital admissions.  NO malignant hyperthermia and or pseudo cholinesterase deficiency.    The patient is not  a Mandaen and will accept blood and blood products if medically indicated.   No history of blood transfusions .Type and screen not sent.     Social History  Social History     Substance and  Sexual Activity   Drug Use Never      Social History     Substance and Sexual Activity   Alcohol Use Not Currently      Social History     Tobacco Use   Smoking Status Never   Smokeless Tobacco Never          Family History   Problem Relation Name Age of Onset    Other (parkinson) Mother      Pulmonary embolism Father      Arthritis Sister      Cancer Sister      Prostate cancer Brother         No Known Allergies    Prior to Admission medications    Medication Sig Start Date End Date Taking? Authorizing Provider   acetaminophen (Tylenol) 500 mg tablet Take 2 tablets (1,000 mg) by mouth every 8 hours. 3/20/24 5/19/24  Fanta Christianson MD   ALPRAZolam (Xanax) 0.5 mg tablet Take 1 tablet (0.5 mg) by mouth once daily as needed. 11/14/22   Historical Provider, MD   apixaban (Eliquis) 2.5 mg tablet Take 1 tablet (2.5 mg) by mouth 2 times a day for 35 doses. Start morning of postoperative day 2 and continue for 5 weeks 3/20/24 4/7/24  Fanta Christianson MD   aspirin 81 mg chewable tablet Chew 1 tablet (81 mg) 2 times a day for 3 doses. Start postoperative day 0 and continue for 3 doses 3/20/24 3/22/24  Fanta Christianson MD   cefadroxil (Duricef) 500 mg capsule Take 1 capsule (500 mg) by mouth 2 times a day for 7 days. 3/20/24 3/27/24  Fanta Christianson MD   chlorhexidine (Peridex) 0.12 % solution Use 15 mL in the mouth or throat once daily for 2 days. 3/26/24 3/28/24  CAPRI Gallagher-CNP   cholecalciferol (Vitamin D3) 25 MCG (1000 UT) capsule Take 1 capsule (25 mcg) by mouth once daily.    Historical Provider, MD   diphenhydrAMINE (Sominex) 25 mg tablet Take 1 tablet (25 mg) by mouth as needed at bedtime for sleep.    Historical Provider, MD   docusate sodium (Colace) 100 mg capsule Take 1 capsule (100 mg) by mouth 2 times a day for 15 days. 3/20/24 4/4/24  Fanta Christianson MD   losartan (Cozaar) 50 mg tablet Take 1 tablet (50 mg) by mouth once daily. 6/5/23   Milo Gold DO   naproxen (Naprosyn) 500 mg  tablet TAKE 1 TABLET (500 MG) BY MOUTH 2 TIMES A DAY AS NEEDED FOR MILD PAIN (1 - 3) (PAIN). 3/21/24 6/19/24  Milo Gold DO   ondansetron (Zofran) 4 mg tablet Take 1 tablet (4 mg) by mouth every 8 hours if needed for nausea or vomiting. 3/20/24   Fanta Christianson MD   oxyCODONE (Roxicodone) 5 mg immediate release tablet Take 1-2 tablets (5-10 mg) by mouth every 6 hours if needed for severe pain (7 - 10) for up to 7 days. 3/20/24 3/27/24  Fanta Christianson MD   pantoprazole (ProtoNix) 40 mg EC tablet Take 1 tablet (40 mg) by mouth once daily in the morning. Take before meals. Do not crush, chew, or split. 3/20/24 4/19/24  Fanta Christianson MD   sennosides (Senokot) 8.6 mg tablet Take 1 tablet (8.6 mg) by mouth once daily for 15 days. 3/20/24 4/4/24  Fanta Christianson MD   sildenafil (Viagra) 50 mg tablet Take 1 tablet (50 mg) by mouth once daily as needed for erectile dysfunction. 1 HOUR BEFORE SEXUAL ACTIVITY 11/20/23   Milo Gold DO   traMADol (Ultram) 50 mg tablet Take 1-2 tablets ( mg) by mouth every 6 hours if needed for severe pain (7 - 10) for up to 7 days. 3/20/24 3/27/24  Fanta Christianson MD   naproxen (Naprosyn) 500 mg tablet TAKE 1 TABLET (500 MG) BY MOUTH 2 TIMES A DAY AS NEEDED FOR MILD PAIN (1 - 3) (PAIN). 2/19/24 3/21/24  Milo Gold DO        PAT ROS:   Constitutional:   neg    Neuro/Psych:   Eyes:   Ears:   Nose:   Mouth:   Throat:   neg    Neck:   neg    Cardio:   neg    Respiratory:   neg    Endocrine:   GI:   neg    :   neg    Musculoskeletal:    arthralgias (left hip)  Hematologic:    bruises/bleeds easily  Skin:  neg        Physical Exam  Vitals reviewed.   Constitutional:       Appearance: Normal appearance.   HENT:      Head: Normocephalic.      Mouth/Throat:      Mouth: Mucous membranes are dry.   Eyes:      Extraocular Movements: Extraocular movements intact.      Pupils: Pupils are equal, round, and reactive to light.   Cardiovascular:      Rate and Rhythm:  "Normal rate and regular rhythm.      Pulses: Normal pulses.      Heart sounds: Normal heart sounds.   Pulmonary:      Effort: Pulmonary effort is normal.      Breath sounds: Normal breath sounds.   Musculoskeletal:         General: Normal range of motion.   Skin:     General: Skin is warm and dry.   Neurological:      General: No focal deficit present.      Mental Status: He is alert and oriented to person, place, and time.   Psychiatric:         Mood and Affect: Mood normal.         Behavior: Behavior normal.          PAT AIRWAY:   Airway:     Mallampati::  IV   upper dentures and lower dentures    /83   Pulse 68   Temp 35.7 °C (96.3 °F)   Resp 18   Ht 1.886 m (6' 2.25\")   Wt 83.6 kg (184 lb 4.9 oz)   SpO2 100%   BMI 23.50 kg/m²     EKG IN EPIC     Lab Results   Component Value Date    WBC 5.1 03/26/2024    HGB 14.5 03/26/2024    HCT 45.1 03/26/2024    MCV 86 03/26/2024     (L) 03/26/2024     Results from last 7 days   Lab Units 03/26/24  0907   SODIUM mmol/L 140   POTASSIUM mmol/L 3.4*   CHLORIDE mmol/L 105   CO2 mmol/L 29   BUN mg/dL 21   CREATININE mg/dL 0.88   CALCIUM mg/dL 8.8   PROTEIN TOTAL g/dL 7.5   BILIRUBIN TOTAL mg/dL 0.6   ALK PHOS U/L 80   ALT U/L 12   AST U/L 15   GLUCOSE mg/dL 94       ASSESSMENT/PLAN    Patient is a 73 year-old  scheduled for Left Hip Total Arthroplasty ~  Anterior Approach  with Dr. Christianson   on  3/29/24 .  CARDIOVASCULAR:  RCRI score / Risk: The patients score is 0 based on history . Per ACC/AHA guidelines this places him  at  3.9% risk for MACE undergoing a intermediate  risk procedure . The patient has the following risk factors: DENIES   Functional Capacity: The patients exercise tolerance is  4  METS. This is based on the patients ability t shovel snow and push mow . Patient denies  active cardiac symptoms or anginal equivalents .      PULMONARY:  The patient has the following factors that place them at increased risk of perioperative pulmonary " complications;age greater than 65//poor functional status/greater than 1.5 hour procedure.  Postoperatively the patient would benefit from early pulmonary toilet/incentive spirometry q 1-2 hours while awake/pulse oximetry/cautious use of respiratory depressant medications such as opioids/elevate the HOB/oral hygiene.    CLL:  History of CLL diagnosed 2014 .Treated in 2019. June 2020 WBC WNL. Remains stable     HYPOGAMMOGLOBINEMIA:  Pt immune deficient. Receives monthly IVIG  Pt is considered immunodeficient and at increase risk for infection/sepsis.  Recommendations :Patient needs preoperative antibiotic prophylaxis.  Instruct patient regarding signs and symptoms of infection and to report to appropriate provider.    HX HEP B :  Hepatitis B core positive. Treated with tenovir. LFT's within normal limits .Liver normal size .      THROMBOPENIA:  The patient’s platelet count came back at  131.  .  Upon trending this is chronic for this patient.The patient has  abnormal skin bruising, of the hands .  Type and screen sent .  BPH:  Pt is on (alpha 5- reductase inhibitors) Recommendations: continue throughout perioperative period, monitior for urinary retention, avoid barakat catheter if able..      DVT:  CAPRINI SCORE=  The patient has the following factors that increase his  Risk for thrombus formation ; Virchow's triad ,age 73,  TJR , Surgical procedure >2 hrs  procedure .    Recommendations: DVT prophylaxis  per Dr. Christianson protocol . SCD's, BILLY's, and early ambulation are recommended. Heparin or LMWH is recommended for the very high risk .      Risk assessment complete.  Patient is scheduled for  intermediate  surgical risk procedure.  Patient is considered an acceptable  risk to proceed with the planned procedure.      Preoperative medication instructions were provided and reviewed with the patient.  Any additional testing or evaluation was explained to the patient.  Nothing by mouth instructions were discussed and  patient's questions were answered prior to conclusion to this encounter.  Patient verbalized understanding of preoperative instructions given in preadmission testing; discharge instructions available in EMR.

## 2024-03-27 LAB
ATRIAL RATE: 75 BPM
P AXIS: 24 DEGREES
P OFFSET: 169 MS
P ONSET: 104 MS
PR INTERVAL: 226 MS
Q ONSET: 217 MS
QRS COUNT: 12 BEATS
QRS DURATION: 138 MS
QT INTERVAL: 416 MS
QTC CALCULATION(BAZETT): 464 MS
QTC FREDERICIA: 447 MS
R AXIS: -50 DEGREES
T AXIS: 67 DEGREES
T OFFSET: 425 MS
VENTRICULAR RATE: 75 BPM

## 2024-03-28 ENCOUNTER — APPOINTMENT (OUTPATIENT)
Dept: HEMATOLOGY/ONCOLOGY | Facility: HOSPITAL | Age: 74
End: 2024-03-28
Payer: MEDICARE

## 2024-03-28 LAB — STAPHYLOCOCCUS SPEC CULT: NORMAL

## 2024-03-29 ENCOUNTER — HOME HEALTH ADMISSION (OUTPATIENT)
Dept: HOME HEALTH SERVICES | Facility: HOME HEALTH | Age: 74
End: 2024-03-29
Payer: MEDICARE

## 2024-03-29 ENCOUNTER — HOSPITAL ENCOUNTER (OUTPATIENT)
Facility: HOSPITAL | Age: 74
Discharge: HOME | End: 2024-03-30
Attending: STUDENT IN AN ORGANIZED HEALTH CARE EDUCATION/TRAINING PROGRAM | Admitting: STUDENT IN AN ORGANIZED HEALTH CARE EDUCATION/TRAINING PROGRAM
Payer: MEDICARE

## 2024-03-29 ENCOUNTER — APPOINTMENT (OUTPATIENT)
Dept: RADIOLOGY | Facility: HOSPITAL | Age: 74
End: 2024-03-29
Payer: MEDICARE

## 2024-03-29 ENCOUNTER — ANESTHESIA (OUTPATIENT)
Dept: OPERATING ROOM | Facility: HOSPITAL | Age: 74
End: 2024-03-29
Payer: MEDICARE

## 2024-03-29 ENCOUNTER — PHARMACY VISIT (OUTPATIENT)
Dept: PHARMACY | Facility: CLINIC | Age: 74
End: 2024-03-29
Payer: COMMERCIAL

## 2024-03-29 ENCOUNTER — ANESTHESIA EVENT (OUTPATIENT)
Dept: OPERATING ROOM | Facility: HOSPITAL | Age: 74
End: 2024-03-29
Payer: MEDICARE

## 2024-03-29 DIAGNOSIS — M16.12 UNILATERAL PRIMARY OSTEOARTHRITIS, LEFT HIP: Primary | ICD-10-CM

## 2024-03-29 PROBLEM — Z96.642 STATUS POST HIP REPLACEMENT, LEFT: Status: ACTIVE | Noted: 2024-03-29

## 2024-03-29 LAB
ABO GROUP (TYPE) IN BLOOD: NORMAL
RH FACTOR (ANTIGEN D): NORMAL

## 2024-03-29 PROCEDURE — 2500000004 HC RX 250 GENERAL PHARMACY W/ HCPCS (ALT 636 FOR OP/ED): Performed by: STUDENT IN AN ORGANIZED HEALTH CARE EDUCATION/TRAINING PROGRAM

## 2024-03-29 PROCEDURE — 36415 COLL VENOUS BLD VENIPUNCTURE: CPT | Performed by: STUDENT IN AN ORGANIZED HEALTH CARE EDUCATION/TRAINING PROGRAM

## 2024-03-29 PROCEDURE — 2500000002 HC RX 250 W HCPCS SELF ADMINISTERED DRUGS (ALT 637 FOR MEDICARE OP, ALT 636 FOR OP/ED)

## 2024-03-29 PROCEDURE — 72170 X-RAY EXAM OF PELVIS: CPT

## 2024-03-29 PROCEDURE — 2720000007 HC OR 272 NO HCPCS: Performed by: STUDENT IN AN ORGANIZED HEALTH CARE EDUCATION/TRAINING PROGRAM

## 2024-03-29 PROCEDURE — 99100 ANES PT EXTEME AGE<1 YR&>70: CPT | Performed by: ANESTHESIOLOGY

## 2024-03-29 PROCEDURE — 97530 THERAPEUTIC ACTIVITIES: CPT | Mod: GP

## 2024-03-29 PROCEDURE — G0378 HOSPITAL OBSERVATION PER HR: HCPCS

## 2024-03-29 PROCEDURE — 2500000005 HC RX 250 GENERAL PHARMACY W/O HCPCS: Performed by: STUDENT IN AN ORGANIZED HEALTH CARE EDUCATION/TRAINING PROGRAM

## 2024-03-29 PROCEDURE — 97161 PT EVAL LOW COMPLEX 20 MIN: CPT | Mod: GP

## 2024-03-29 PROCEDURE — 97110 THERAPEUTIC EXERCISES: CPT | Mod: GP

## 2024-03-29 PROCEDURE — 76000 FLUOROSCOPY <1 HR PHYS/QHP: CPT | Mod: LT

## 2024-03-29 PROCEDURE — 3600000018 HC OR TIME - INITIAL BASE CHARGE - PROCEDURE LEVEL SIX: Performed by: STUDENT IN AN ORGANIZED HEALTH CARE EDUCATION/TRAINING PROGRAM

## 2024-03-29 PROCEDURE — 7100000002 HC RECOVERY ROOM TIME - EACH INCREMENTAL 1 MINUTE: Performed by: STUDENT IN AN ORGANIZED HEALTH CARE EDUCATION/TRAINING PROGRAM

## 2024-03-29 PROCEDURE — C1713 ANCHOR/SCREW BN/BN,TIS/BN: HCPCS | Performed by: STUDENT IN AN ORGANIZED HEALTH CARE EDUCATION/TRAINING PROGRAM

## 2024-03-29 PROCEDURE — A4217 STERILE WATER/SALINE, 500 ML: HCPCS | Mod: MUE | Performed by: STUDENT IN AN ORGANIZED HEALTH CARE EDUCATION/TRAINING PROGRAM

## 2024-03-29 PROCEDURE — 7100000001 HC RECOVERY ROOM TIME - INITIAL BASE CHARGE: Performed by: STUDENT IN AN ORGANIZED HEALTH CARE EDUCATION/TRAINING PROGRAM

## 2024-03-29 PROCEDURE — 72170 X-RAY EXAM OF PELVIS: CPT | Performed by: RADIOLOGY

## 2024-03-29 PROCEDURE — 2500000001 HC RX 250 WO HCPCS SELF ADMINISTERED DRUGS (ALT 637 FOR MEDICARE OP)

## 2024-03-29 PROCEDURE — 7100000011 HC EXTENDED STAY RECOVERY HOURLY - NURSING UNIT

## 2024-03-29 PROCEDURE — 2500000004 HC RX 250 GENERAL PHARMACY W/ HCPCS (ALT 636 FOR OP/ED): Performed by: NURSE ANESTHETIST, CERTIFIED REGISTERED

## 2024-03-29 PROCEDURE — A27130 PR TOTAL HIP ARTHROPLASTY: Performed by: NURSE ANESTHETIST, CERTIFIED REGISTERED

## 2024-03-29 PROCEDURE — C1776 JOINT DEVICE (IMPLANTABLE): HCPCS | Performed by: STUDENT IN AN ORGANIZED HEALTH CARE EDUCATION/TRAINING PROGRAM

## 2024-03-29 PROCEDURE — 2780000003 HC OR 278 NO HCPCS: Performed by: STUDENT IN AN ORGANIZED HEALTH CARE EDUCATION/TRAINING PROGRAM

## 2024-03-29 PROCEDURE — 2500000005 HC RX 250 GENERAL PHARMACY W/O HCPCS: Performed by: NURSE ANESTHETIST, CERTIFIED REGISTERED

## 2024-03-29 PROCEDURE — 3700000002 HC GENERAL ANESTHESIA TIME - EACH INCREMENTAL 1 MINUTE: Performed by: STUDENT IN AN ORGANIZED HEALTH CARE EDUCATION/TRAINING PROGRAM

## 2024-03-29 PROCEDURE — A27130 PR TOTAL HIP ARTHROPLASTY: Performed by: ANESTHESIOLOGY

## 2024-03-29 PROCEDURE — RXMED WILLOW AMBULATORY MEDICATION CHARGE

## 2024-03-29 PROCEDURE — 27130 TOTAL HIP ARTHROPLASTY: CPT | Performed by: STUDENT IN AN ORGANIZED HEALTH CARE EDUCATION/TRAINING PROGRAM

## 2024-03-29 PROCEDURE — 3600000017 HC OR TIME - EACH INCREMENTAL 1 MINUTE - PROCEDURE LEVEL SIX: Performed by: STUDENT IN AN ORGANIZED HEALTH CARE EDUCATION/TRAINING PROGRAM

## 2024-03-29 PROCEDURE — 3700000001 HC GENERAL ANESTHESIA TIME - INITIAL BASE CHARGE: Performed by: STUDENT IN AN ORGANIZED HEALTH CARE EDUCATION/TRAINING PROGRAM

## 2024-03-29 PROCEDURE — 2500000004 HC RX 250 GENERAL PHARMACY W/ HCPCS (ALT 636 FOR OP/ED)

## 2024-03-29 DEVICE — IMPLANTABLE DEVICE: Type: IMPLANTABLE DEVICE | Site: HIP | Status: FUNCTIONAL

## 2024-03-29 DEVICE — SCREW CANCELLOUS 6.5 X 25: Type: IMPLANTABLE DEVICE | Site: HIP | Status: FUNCTIONAL

## 2024-03-29 RX ORDER — SCOLOPAMINE TRANSDERMAL SYSTEM 1 MG/1
1 PATCH, EXTENDED RELEASE TRANSDERMAL ONCE
Status: DISCONTINUED | OUTPATIENT
Start: 2024-03-29 | End: 2024-03-30 | Stop reason: HOSPADM

## 2024-03-29 RX ORDER — HYDRALAZINE HYDROCHLORIDE 20 MG/ML
5 INJECTION INTRAMUSCULAR; INTRAVENOUS EVERY 30 MIN PRN
Status: DISCONTINUED | OUTPATIENT
Start: 2024-03-29 | End: 2024-03-29

## 2024-03-29 RX ORDER — SODIUM CHLORIDE 9 MG/ML
22 INJECTION INTRAMUSCULAR; INTRAVENOUS; SUBCUTANEOUS ONCE
Status: DISCONTINUED | OUTPATIENT
Start: 2024-03-29 | End: 2024-03-29

## 2024-03-29 RX ORDER — CEFAZOLIN SODIUM 2 G/100ML
2 INJECTION, SOLUTION INTRAVENOUS ONCE
Status: DISCONTINUED | OUTPATIENT
Start: 2024-03-29 | End: 2024-03-29

## 2024-03-29 RX ORDER — KETOROLAC TROMETHAMINE 30 MG/ML
15 INJECTION, SOLUTION INTRAMUSCULAR; INTRAVENOUS EVERY 6 HOURS
Status: DISCONTINUED | OUTPATIENT
Start: 2024-03-29 | End: 2024-03-29

## 2024-03-29 RX ORDER — DIPHENHYDRAMINE HYDROCHLORIDE 50 MG/ML
12.5 INJECTION INTRAMUSCULAR; INTRAVENOUS EVERY 6 HOURS PRN
Status: DISCONTINUED | OUTPATIENT
Start: 2024-03-29 | End: 2024-03-30 | Stop reason: HOSPADM

## 2024-03-29 RX ORDER — BISACODYL 5 MG
10 TABLET, DELAYED RELEASE (ENTERIC COATED) ORAL DAILY PRN
Status: DISCONTINUED | OUTPATIENT
Start: 2024-03-29 | End: 2024-03-30 | Stop reason: HOSPADM

## 2024-03-29 RX ORDER — CEFAZOLIN 1 G/1
INJECTION, POWDER, FOR SOLUTION INTRAVENOUS AS NEEDED
Status: DISCONTINUED | OUTPATIENT
Start: 2024-03-29 | End: 2024-03-29 | Stop reason: HOSPADM

## 2024-03-29 RX ORDER — MIDAZOLAM HYDROCHLORIDE 1 MG/ML
INJECTION INTRAMUSCULAR; INTRAVENOUS AS NEEDED
Status: DISCONTINUED | OUTPATIENT
Start: 2024-03-29 | End: 2024-03-29

## 2024-03-29 RX ORDER — PHENYLEPHRINE HCL IN 0.9% NACL 1 MG/10 ML
SYRINGE (ML) INTRAVENOUS AS NEEDED
Status: DISCONTINUED | OUTPATIENT
Start: 2024-03-29 | End: 2024-03-29

## 2024-03-29 RX ORDER — NALOXONE HYDROCHLORIDE 1 MG/ML
0.2 INJECTION INTRAMUSCULAR; INTRAVENOUS; SUBCUTANEOUS EVERY 5 MIN PRN
Status: DISCONTINUED | OUTPATIENT
Start: 2024-03-29 | End: 2024-03-30 | Stop reason: HOSPADM

## 2024-03-29 RX ORDER — LABETALOL HYDROCHLORIDE 5 MG/ML
5 INJECTION, SOLUTION INTRAVENOUS ONCE AS NEEDED
Status: DISCONTINUED | OUTPATIENT
Start: 2024-03-29 | End: 2024-03-29

## 2024-03-29 RX ORDER — ONDANSETRON HYDROCHLORIDE 2 MG/ML
4 INJECTION, SOLUTION INTRAVENOUS ONCE AS NEEDED
Status: DISCONTINUED | OUTPATIENT
Start: 2024-03-29 | End: 2024-03-29

## 2024-03-29 RX ORDER — TRANEXAMIC ACID 100 MG/ML
INJECTION, SOLUTION INTRAVENOUS AS NEEDED
Status: DISCONTINUED | OUTPATIENT
Start: 2024-03-29 | End: 2024-03-29

## 2024-03-29 RX ORDER — PROPOFOL 10 MG/ML
INJECTION, EMULSION INTRAVENOUS CONTINUOUS PRN
Status: DISCONTINUED | OUTPATIENT
Start: 2024-03-29 | End: 2024-03-29

## 2024-03-29 RX ORDER — SODIUM CHLORIDE 9 MG/ML
20 INJECTION INTRAMUSCULAR; INTRAVENOUS; SUBCUTANEOUS ONCE
Status: DISCONTINUED | OUTPATIENT
Start: 2024-03-29 | End: 2024-03-29

## 2024-03-29 RX ORDER — ALBUTEROL SULFATE 0.83 MG/ML
2.5 SOLUTION RESPIRATORY (INHALATION) ONCE AS NEEDED
Status: DISCONTINUED | OUTPATIENT
Start: 2024-03-29 | End: 2024-03-29

## 2024-03-29 RX ORDER — OXYCODONE HYDROCHLORIDE 5 MG/1
5 TABLET ORAL EVERY 6 HOURS PRN
Status: DISCONTINUED | OUTPATIENT
Start: 2024-03-29 | End: 2024-03-30 | Stop reason: HOSPADM

## 2024-03-29 RX ORDER — PROMETHAZINE HYDROCHLORIDE 25 MG/ML
6.25 INJECTION, SOLUTION INTRAMUSCULAR; INTRAVENOUS ONCE AS NEEDED
Status: DISCONTINUED | OUTPATIENT
Start: 2024-03-29 | End: 2024-03-29 | Stop reason: HOSPADM

## 2024-03-29 RX ORDER — SODIUM CHLORIDE, SODIUM LACTATE, POTASSIUM CHLORIDE, CALCIUM CHLORIDE 600; 310; 30; 20 MG/100ML; MG/100ML; MG/100ML; MG/100ML
100 INJECTION, SOLUTION INTRAVENOUS CONTINUOUS
Status: DISCONTINUED | OUTPATIENT
Start: 2024-03-29 | End: 2024-03-29 | Stop reason: HOSPADM

## 2024-03-29 RX ORDER — KETOROLAC TROMETHAMINE 30 MG/ML
15 INJECTION, SOLUTION INTRAMUSCULAR; INTRAVENOUS EVERY 6 HOURS
Status: COMPLETED | OUTPATIENT
Start: 2024-03-29 | End: 2024-03-30

## 2024-03-29 RX ORDER — BUPIVACAINE HYDROCHLORIDE 5 MG/ML
20 INJECTION, SOLUTION PERINEURAL ONCE
Status: DISCONTINUED | OUTPATIENT
Start: 2024-03-29 | End: 2024-03-29

## 2024-03-29 RX ORDER — SODIUM CHLORIDE, SODIUM LACTATE, POTASSIUM CHLORIDE, CALCIUM CHLORIDE 600; 310; 30; 20 MG/100ML; MG/100ML; MG/100ML; MG/100ML
100 INJECTION, SOLUTION INTRAVENOUS CONTINUOUS
Status: DISCONTINUED | OUTPATIENT
Start: 2024-03-29 | End: 2024-03-30

## 2024-03-29 RX ORDER — FENTANYL CITRATE 50 UG/ML
INJECTION, SOLUTION INTRAMUSCULAR; INTRAVENOUS AS NEEDED
Status: DISCONTINUED | OUTPATIENT
Start: 2024-03-29 | End: 2024-03-29

## 2024-03-29 RX ORDER — ACETAMINOPHEN 325 MG/1
650 TABLET ORAL EVERY 6 HOURS SCHEDULED
Status: DISCONTINUED | OUTPATIENT
Start: 2024-03-29 | End: 2024-03-30 | Stop reason: HOSPADM

## 2024-03-29 RX ORDER — CEFAZOLIN 1 G/1
INJECTION, POWDER, FOR SOLUTION INTRAVENOUS AS NEEDED
Status: DISCONTINUED | OUTPATIENT
Start: 2024-03-29 | End: 2024-03-29

## 2024-03-29 RX ORDER — SYRING-NEEDL,DISP,INSUL,0.3 ML 29 G X1/2"
297 SYRINGE, EMPTY DISPOSABLE MISCELLANEOUS ONCE AS NEEDED
Status: DISCONTINUED | OUTPATIENT
Start: 2024-03-29 | End: 2024-03-30 | Stop reason: HOSPADM

## 2024-03-29 RX ORDER — OXYCODONE HYDROCHLORIDE 5 MG/1
2.5 TABLET ORAL EVERY 4 HOURS PRN
Status: DISCONTINUED | OUTPATIENT
Start: 2024-03-29 | End: 2024-03-30 | Stop reason: HOSPADM

## 2024-03-29 RX ORDER — KETOROLAC TROMETHAMINE 30 MG/ML
INJECTION, SOLUTION INTRAMUSCULAR; INTRAVENOUS AS NEEDED
Status: DISCONTINUED | OUTPATIENT
Start: 2024-03-29 | End: 2024-03-29

## 2024-03-29 RX ORDER — IBUPROFEN 600 MG/1
600 TABLET ORAL EVERY 8 HOURS
Status: DISCONTINUED | OUTPATIENT
Start: 2024-03-30 | End: 2024-03-30 | Stop reason: HOSPADM

## 2024-03-29 RX ORDER — SODIUM CHLORIDE, SODIUM LACTATE, POTASSIUM CHLORIDE, CALCIUM CHLORIDE 600; 310; 30; 20 MG/100ML; MG/100ML; MG/100ML; MG/100ML
50 INJECTION, SOLUTION INTRAVENOUS CONTINUOUS
Status: DISCONTINUED | OUTPATIENT
Start: 2024-03-29 | End: 2024-03-30 | Stop reason: HOSPADM

## 2024-03-29 RX ORDER — OXYCODONE HYDROCHLORIDE 5 MG/1
10 TABLET ORAL EVERY 4 HOURS PRN
Status: DISCONTINUED | OUTPATIENT
Start: 2024-03-29 | End: 2024-03-30 | Stop reason: HOSPADM

## 2024-03-29 RX ORDER — LIDOCAINE HYDROCHLORIDE 10 MG/ML
0.1 INJECTION, SOLUTION EPIDURAL; INFILTRATION; INTRACAUDAL; PERINEURAL ONCE
Status: DISCONTINUED | OUTPATIENT
Start: 2024-03-29 | End: 2024-03-29

## 2024-03-29 RX ORDER — ONDANSETRON HYDROCHLORIDE 2 MG/ML
4 INJECTION, SOLUTION INTRAVENOUS EVERY 8 HOURS PRN
Status: DISCONTINUED | OUTPATIENT
Start: 2024-03-29 | End: 2024-03-30 | Stop reason: HOSPADM

## 2024-03-29 RX ORDER — CEFAZOLIN 1 G/1
500 INJECTION, POWDER, FOR SOLUTION INTRAVENOUS ONCE
Status: DISCONTINUED | OUTPATIENT
Start: 2024-03-29 | End: 2024-03-29

## 2024-03-29 RX ORDER — TRANEXAMIC ACID 650 MG/1
1950 TABLET ORAL ONCE
Status: COMPLETED | OUTPATIENT
Start: 2024-03-29 | End: 2024-03-29

## 2024-03-29 RX ORDER — BUPIVACAINE HCL/EPINEPHRINE 0.5-1:200K
30 VIAL (ML) INJECTION ONCE
Status: DISCONTINUED | OUTPATIENT
Start: 2024-03-29 | End: 2024-03-29

## 2024-03-29 RX ORDER — NORETHINDRONE AND ETHINYL ESTRADIOL 0.5-0.035
KIT ORAL AS NEEDED
Status: DISCONTINUED | OUTPATIENT
Start: 2024-03-29 | End: 2024-03-29

## 2024-03-29 RX ORDER — PANTOPRAZOLE SODIUM 40 MG/1
40 TABLET, DELAYED RELEASE ORAL
Status: DISCONTINUED | OUTPATIENT
Start: 2024-03-30 | End: 2024-03-30 | Stop reason: HOSPADM

## 2024-03-29 RX ORDER — ONDANSETRON 4 MG/1
4 TABLET, FILM COATED ORAL EVERY 8 HOURS PRN
Status: DISCONTINUED | OUTPATIENT
Start: 2024-03-29 | End: 2024-03-30 | Stop reason: HOSPADM

## 2024-03-29 RX ORDER — CEFAZOLIN SODIUM 2 G/100ML
2 INJECTION, SOLUTION INTRAVENOUS EVERY 8 HOURS
Status: COMPLETED | OUTPATIENT
Start: 2024-03-29 | End: 2024-03-29

## 2024-03-29 RX ORDER — SODIUM CHLORIDE 0.9 G/100ML
IRRIGANT IRRIGATION AS NEEDED
Status: DISCONTINUED | OUTPATIENT
Start: 2024-03-29 | End: 2024-03-29 | Stop reason: HOSPADM

## 2024-03-29 RX ORDER — POLYETHYLENE GLYCOL 3350 17 G/17G
17 POWDER, FOR SOLUTION ORAL DAILY
Status: DISCONTINUED | OUTPATIENT
Start: 2024-03-29 | End: 2024-03-30 | Stop reason: HOSPADM

## 2024-03-29 RX ORDER — ASPIRIN 81 MG/1
81 TABLET ORAL 2 TIMES DAILY
Status: DISCONTINUED | OUTPATIENT
Start: 2024-03-29 | End: 2024-03-30 | Stop reason: HOSPADM

## 2024-03-29 RX ORDER — OXYCODONE HYDROCHLORIDE 5 MG/1
5 TABLET ORAL EVERY 4 HOURS PRN
Status: DISCONTINUED | OUTPATIENT
Start: 2024-03-29 | End: 2024-03-29

## 2024-03-29 RX ADMIN — TRANEXAMIC ACID 1950 MG: 650 TABLET ORAL at 13:50

## 2024-03-29 RX ADMIN — HYDROMORPHONE HYDROCHLORIDE 0.2 MG: 0.2 INJECTION, SOLUTION INTRAMUSCULAR; INTRAVENOUS; SUBCUTANEOUS at 12:01

## 2024-03-29 RX ADMIN — CEFAZOLIN 2 G: 1 INJECTION, POWDER, FOR SOLUTION INTRAMUSCULAR; INTRAVENOUS at 08:09

## 2024-03-29 RX ADMIN — Medication 200 MCG: at 08:44

## 2024-03-29 RX ADMIN — SODIUM CHLORIDE, POTASSIUM CHLORIDE, SODIUM LACTATE AND CALCIUM CHLORIDE: 600; 310; 30; 20 INJECTION, SOLUTION INTRAVENOUS at 09:47

## 2024-03-29 RX ADMIN — SODIUM CHLORIDE, POTASSIUM CHLORIDE, SODIUM LACTATE AND CALCIUM CHLORIDE 50 ML/HR: 600; 310; 30; 20 INJECTION, SOLUTION INTRAVENOUS at 13:51

## 2024-03-29 RX ADMIN — OXYCODONE HYDROCHLORIDE 10 MG: 5 TABLET ORAL at 21:38

## 2024-03-29 RX ADMIN — DIPHENHYDRAMINE HYDROCHLORIDE 12.5 MG: 50 INJECTION, SOLUTION INTRAMUSCULAR; INTRAVENOUS at 22:02

## 2024-03-29 RX ADMIN — POLYETHYLENE GLYCOL 3350 17 G: 17 POWDER, FOR SOLUTION ORAL at 13:50

## 2024-03-29 RX ADMIN — ASPIRIN 81 MG: 81 TABLET, COATED ORAL at 21:37

## 2024-03-29 RX ADMIN — KETOROLAC TROMETHAMINE 15 MG: 30 INJECTION, SOLUTION INTRAMUSCULAR at 21:37

## 2024-03-29 RX ADMIN — SODIUM CHLORIDE, POTASSIUM CHLORIDE, SODIUM LACTATE AND CALCIUM CHLORIDE 50 ML/HR: 600; 310; 30; 20 INJECTION, SOLUTION INTRAVENOUS at 21:30

## 2024-03-29 RX ADMIN — FENTANYL CITRATE 25 MCG: 50 INJECTION, SOLUTION INTRAMUSCULAR; INTRAVENOUS at 07:55

## 2024-03-29 RX ADMIN — CEFAZOLIN SODIUM 2 G: 2 INJECTION, SOLUTION INTRAVENOUS at 13:51

## 2024-03-29 RX ADMIN — KETOROLAC TROMETHAMINE 15 MG: 30 INJECTION, SOLUTION INTRAMUSCULAR at 18:13

## 2024-03-29 RX ADMIN — Medication 100 MCG: at 08:32

## 2024-03-29 RX ADMIN — SODIUM CHLORIDE, POTASSIUM CHLORIDE, SODIUM LACTATE AND CALCIUM CHLORIDE: 600; 310; 30; 20 INJECTION, SOLUTION INTRAVENOUS at 07:46

## 2024-03-29 RX ADMIN — FENTANYL CITRATE 25 MCG: 50 INJECTION, SOLUTION INTRAMUSCULAR; INTRAVENOUS at 09:12

## 2024-03-29 RX ADMIN — KETOROLAC TROMETHAMINE 30 MG: 30 INJECTION, SOLUTION INTRAMUSCULAR at 10:09

## 2024-03-29 RX ADMIN — CEFAZOLIN SODIUM 2 G: 2 INJECTION, SOLUTION INTRAVENOUS at 21:36

## 2024-03-29 RX ADMIN — FENTANYL CITRATE 25 MCG: 50 INJECTION, SOLUTION INTRAMUSCULAR; INTRAVENOUS at 09:03

## 2024-03-29 RX ADMIN — ACETAMINOPHEN 650 MG: 325 TABLET ORAL at 18:09

## 2024-03-29 RX ADMIN — SCOPALAMINE 1 PATCH: 1 PATCH, EXTENDED RELEASE TRANSDERMAL at 13:52

## 2024-03-29 RX ADMIN — EPHEDRINE SULFATE 20 MG: 50 INJECTION, SOLUTION INTRAVENOUS at 08:56

## 2024-03-29 RX ADMIN — MIDAZOLAM HYDROCHLORIDE 2 MG: 1 INJECTION, SOLUTION INTRAMUSCULAR; INTRAVENOUS at 07:48

## 2024-03-29 RX ADMIN — FENTANYL CITRATE 25 MCG: 50 INJECTION, SOLUTION INTRAMUSCULAR; INTRAVENOUS at 07:59

## 2024-03-29 RX ADMIN — EPHEDRINE SULFATE 10 MG: 50 INJECTION, SOLUTION INTRAVENOUS at 09:39

## 2024-03-29 RX ADMIN — ACETAMINOPHEN 650 MG: 325 TABLET ORAL at 13:49

## 2024-03-29 RX ADMIN — PROPOFOL 200 MCG/KG/MIN: 10 INJECTION, EMULSION INTRAVENOUS at 08:08

## 2024-03-29 RX ADMIN — Medication 100 MCG: at 08:25

## 2024-03-29 RX ADMIN — EPHEDRINE SULFATE 20 MG: 50 INJECTION, SOLUTION INTRAVENOUS at 09:47

## 2024-03-29 RX ADMIN — TRANEXAMIC ACID 1000 MG: 1 INJECTION, SOLUTION INTRAVENOUS at 08:09

## 2024-03-29 RX ADMIN — Medication 100 MCG: at 08:09

## 2024-03-29 SDOH — SOCIAL STABILITY: SOCIAL INSECURITY: ABUSE: ADULT

## 2024-03-29 SDOH — SOCIAL STABILITY: SOCIAL INSECURITY: DO YOU FEEL UNSAFE GOING BACK TO THE PLACE WHERE YOU ARE LIVING?: NO

## 2024-03-29 SDOH — SOCIAL STABILITY: SOCIAL INSECURITY: WERE YOU ABLE TO COMPLETE ALL THE BEHAVIORAL HEALTH SCREENINGS?: YES

## 2024-03-29 SDOH — SOCIAL STABILITY: SOCIAL INSECURITY: DOES ANYONE TRY TO KEEP YOU FROM HAVING/CONTACTING OTHER FRIENDS OR DOING THINGS OUTSIDE YOUR HOME?: NO

## 2024-03-29 SDOH — SOCIAL STABILITY: SOCIAL INSECURITY: ARE YOU OR HAVE YOU BEEN THREATENED OR ABUSED PHYSICALLY, EMOTIONALLY, OR SEXUALLY BY ANYONE?: NO

## 2024-03-29 SDOH — SOCIAL STABILITY: SOCIAL INSECURITY: DO YOU FEEL ANYONE HAS EXPLOITED OR TAKEN ADVANTAGE OF YOU FINANCIALLY OR OF YOUR PERSONAL PROPERTY?: NO

## 2024-03-29 SDOH — SOCIAL STABILITY: SOCIAL INSECURITY: ARE THERE ANY APPARENT SIGNS OF INJURIES/BEHAVIORS THAT COULD BE RELATED TO ABUSE/NEGLECT?: NO

## 2024-03-29 SDOH — SOCIAL STABILITY: SOCIAL INSECURITY: HAS ANYONE EVER THREATENED TO HURT YOUR FAMILY OR YOUR PETS?: NO

## 2024-03-29 SDOH — SOCIAL STABILITY: SOCIAL INSECURITY: HAVE YOU HAD THOUGHTS OF HARMING ANYONE ELSE?: NO

## 2024-03-29 ASSESSMENT — COGNITIVE AND FUNCTIONAL STATUS - GENERAL
EATING MEALS: A LITTLE
MOVING TO AND FROM BED TO CHAIR: A LITTLE
TOILETING: A LITTLE
HELP NEEDED FOR BATHING: A LITTLE
MOVING FROM LYING ON BACK TO SITTING ON SIDE OF FLAT BED WITH BEDRAILS: A LITTLE
PERSONAL GROOMING: A LITTLE
TURNING FROM BACK TO SIDE WHILE IN FLAT BAD: A LITTLE
TURNING FROM BACK TO SIDE WHILE IN FLAT BAD: A LITTLE
HELP NEEDED FOR BATHING: A LITTLE
DRESSING REGULAR UPPER BODY CLOTHING: A LITTLE
WALKING IN HOSPITAL ROOM: A LITTLE
HELP NEEDED FOR BATHING: A LITTLE
MOBILITY SCORE: 18
DRESSING REGULAR UPPER BODY CLOTHING: A LITTLE
CLIMB 3 TO 5 STEPS WITH RAILING: A LOT
TURNING FROM BACK TO SIDE WHILE IN FLAT BAD: A LITTLE
MOVING FROM LYING ON BACK TO SITTING ON SIDE OF FLAT BED WITH BEDRAILS: A LITTLE
CLIMB 3 TO 5 STEPS WITH RAILING: A LITTLE
MOVING TO AND FROM BED TO CHAIR: A LITTLE
TOILETING: A LITTLE
WALKING IN HOSPITAL ROOM: A LITTLE
DRESSING REGULAR LOWER BODY CLOTHING: A LITTLE
MOBILITY SCORE: 17
STANDING UP FROM CHAIR USING ARMS: A LITTLE
MOBILITY SCORE: 17
MOVING FROM LYING ON BACK TO SITTING ON SIDE OF FLAT BED WITH BEDRAILS: A LITTLE
STANDING UP FROM CHAIR USING ARMS: A LITTLE
PATIENT BASELINE BEDBOUND: NO
CLIMB 3 TO 5 STEPS WITH RAILING: A LOT
MOBILITY SCORE: 18
MOVING TO AND FROM BED TO CHAIR: A LITTLE
TURNING FROM BACK TO SIDE WHILE IN FLAT BAD: A LITTLE
STANDING UP FROM CHAIR USING ARMS: A LITTLE
WALKING IN HOSPITAL ROOM: A LITTLE
WALKING IN HOSPITAL ROOM: A LITTLE
DRESSING REGULAR LOWER BODY CLOTHING: A LITTLE
EATING MEALS: A LITTLE
PERSONAL GROOMING: A LITTLE
DAILY ACTIVITIY SCORE: 18
MOVING FROM LYING ON BACK TO SITTING ON SIDE OF FLAT BED WITH BEDRAILS: A LITTLE
DAILY ACTIVITIY SCORE: 18
DRESSING REGULAR UPPER BODY CLOTHING: A LITTLE
CLIMB 3 TO 5 STEPS WITH RAILING: A LITTLE
DRESSING REGULAR LOWER BODY CLOTHING: A LITTLE
EATING MEALS: A LITTLE
PERSONAL GROOMING: A LITTLE
MOVING TO AND FROM BED TO CHAIR: A LITTLE
DAILY ACTIVITIY SCORE: 18
TOILETING: A LITTLE
STANDING UP FROM CHAIR USING ARMS: A LITTLE

## 2024-03-29 ASSESSMENT — ACTIVITIES OF DAILY LIVING (ADL)
ASSISTIVE_DEVICE: WALKER
JUDGMENT_ADEQUATE_SAFELY_COMPLETE_DAILY_ACTIVITIES: YES
FEEDING YOURSELF: INDEPENDENT
PATIENT'S MEMORY ADEQUATE TO SAFELY COMPLETE DAILY ACTIVITIES?: YES
TOILETING: INDEPENDENT
ADEQUATE_TO_COMPLETE_ADL: YES
HEARING - RIGHT EAR: HEARING AID
BATHING: INDEPENDENT
ADL_ASSISTANCE: INDEPENDENT
HEARING - LEFT EAR: HEARING AID
DRESSING YOURSELF: INDEPENDENT
GROOMING: INDEPENDENT
LACK_OF_TRANSPORTATION: PATIENT DECLINED
WALKS IN HOME: NEEDS ASSISTANCE

## 2024-03-29 ASSESSMENT — PATIENT HEALTH QUESTIONNAIRE - PHQ9
1. LITTLE INTEREST OR PLEASURE IN DOING THINGS: NOT AT ALL
SUM OF ALL RESPONSES TO PHQ9 QUESTIONS 1 & 2: 0
2. FEELING DOWN, DEPRESSED OR HOPELESS: NOT AT ALL

## 2024-03-29 ASSESSMENT — PAIN - FUNCTIONAL ASSESSMENT
PAIN_FUNCTIONAL_ASSESSMENT: 0-10

## 2024-03-29 ASSESSMENT — LIFESTYLE VARIABLES
HOW OFTEN DO YOU HAVE A DRINK CONTAINING ALCOHOL: NEVER
HOW MANY STANDARD DRINKS CONTAINING ALCOHOL DO YOU HAVE ON A TYPICAL DAY: PATIENT DOES NOT DRINK
AUDIT-C TOTAL SCORE: 0
HOW OFTEN DO YOU HAVE 6 OR MORE DRINKS ON ONE OCCASION: NEVER
SKIP TO QUESTIONS 9-10: 1
AUDIT-C TOTAL SCORE: 0

## 2024-03-29 ASSESSMENT — COLUMBIA-SUICIDE SEVERITY RATING SCALE - C-SSRS
2. HAVE YOU ACTUALLY HAD ANY THOUGHTS OF KILLING YOURSELF?: NO
6. HAVE YOU EVER DONE ANYTHING, STARTED TO DO ANYTHING, OR PREPARED TO DO ANYTHING TO END YOUR LIFE?: NO
1. IN THE PAST MONTH, HAVE YOU WISHED YOU WERE DEAD OR WISHED YOU COULD GO TO SLEEP AND NOT WAKE UP?: NO

## 2024-03-29 ASSESSMENT — PAIN SCALES - GENERAL
PAINLEVEL_OUTOF10: 4
PAINLEVEL_OUTOF10: 0 - NO PAIN
PAINLEVEL_OUTOF10: 6
PAINLEVEL_OUTOF10: 0 - NO PAIN
PAINLEVEL_OUTOF10: 7

## 2024-03-29 ASSESSMENT — PAIN DESCRIPTION - LOCATION: LOCATION: HIP

## 2024-03-29 ASSESSMENT — PAIN DESCRIPTION - DESCRIPTORS: DESCRIPTORS: ACHING;CRAMPING;SHARP;SORE

## 2024-03-29 ASSESSMENT — PAIN DESCRIPTION - ORIENTATION: ORIENTATION: LEFT

## 2024-03-29 NOTE — NURSING NOTE
Met with Patient, Care Partner, and Family at bedside- Patient is s/p Left Anterior Hip Replacement with Dr. Christianson.  Discussion with patient included education on the following topics: TJR Education: Wound Care, Post-Op Activity, Post-Op Precautions, Cold-Therapy, Importance of post-op prescriptions, When to call the Surgeon's Office, Use of MyChart, and When to call 9-1-1.  Patient completed online class prior to surgery.  Patient is able to verbalize understanding of class content/discussion.  Contact information was provided to patient for support and assistance during the post-operative period.

## 2024-03-29 NOTE — OP NOTE
Op Note      Rafael Walter   [id number]   3/29/2024      Procedure(s):  Left Hip Total Arthroplasty ~  Anterior Approach - Wound Class: Class I/ Clean - Incision Closure: Deep and Superficial Layers      Preoperative Diagnosis: Unilateral primary osteoarthritis, left hip [M16.12]  Status post hip replacement, left [Z96.642]   Postoperative Diagnosis: same      Surgeon:   Fanta Christianson MD       First Assist:   MAHAMED Herman. Nav Higuera, PGY-1      Anesthesia Staff:   Anesthesiologist: Rafael Mai MD  CRNA: CAPRI Carolina-CRNA  C-AA: STEFF Garzon      Anesthesia Type:    Consult      Specimens:   No specimens collected      Estimated Blood Loss:   Minimal      Implants:   Implant Name Type Inv. Item Serial No.  Lot No. LRB No. Used Action   pinnacle cancellous bone screw Joint Hip  N/A DEPUY [id number] Left 1 Implanted   Emphasys polyethylene liner Joint Hip  N/A DEPUY [id number] Left 1 Implanted   emphasys acetabular shell three hole Joint Hip  N/A DEPUY [id number] Left 1 Implanted   STEM, ACTIS COLLAR, HIGH, SIZE 8 - SNA - [id number] Joint Hip STEM, ACTIS COLLAR, HIGH, SIZE 8 NA DEPUY [id number] Left 1 Implanted   HEAD, FEMORAL, DELTA TS CERAMIC 12/14, 40MM +5.0 - SNA - [id number] Joint HEAD, FEMORAL, DELTA TS CERAMIC 12/14, 40MM +5.0 NA DEPUY [id number] Left 1 Implanted         Findings: End-stage osteoarthritis. Large cam deformity. Coxa valgum.      Clinical History:   The patient is a 73 y.o. year-old male with hip degenerative joint disease who failed conservative measures and treatment, and wished to undergo a total hip arthroplasty electively. The patient at this time was explained the risks and benefits of the surgery including infection, bleeding, damage to neurovascular structures and potential for continued pain, continued weakness, worsening pain and worsening weakness, stiffness, stability, limb length discrepancy, fracture, infection requiring the resection arthroplasty and  the need for revision surgery in the future, DVT prophylaxis and the risks for anesthetic complications and pulmonary complications and even death. The patient understood at this point under the risks and wished to proceed.      Description of procedure:   The patient was seen in the preoperative holding area and the appropriate side lower extremity was marked. The patient was brought to the Operating Room and placed supine on the Operating Room table. A preliminary anesthesia time out was then taken.      The patient was placed supine. The antibiotics were administered within thirty minutes of incision time. The patient was then placed on the HANA table with the bilateral lower extremities in traction boots and the body stabilized against the perineal post.  At this point, the patient's surgical area was prepped and draped in the standard, sterile fashion.      A surgical time out was performed to confirm the correct side, procedure, and name. The patient received IV antibiotics and IV TXA. An approximate 8 cm longitudinal incision was made starting superolateral and distal to anterior, superior iliac spine. With a slight proximal medial to distal lateral angulation, the incision was carried down through the subcutaneous tissues down to the level of the tensor fascia misti. With the La retractors in place, the tensor fascia misti was divided. Blunt dissection out of the fascia was done to identify the interval between the tensor fascia misti and the sartorius. An aufranc was placed proximally on the ilium. Once this was done with deep La retractors, blunt dissection was used to identify the ascending branch of the lateral femoral circumflex artery and this was cauterized.      Next, the fat pad between the gluteus medius and the rectus was identified and dissected to the deep interval. At this point, a Dickens was used to elevate the iliocapsularis off the anterior capsule and an Aufranc retractor was placed along  the medial neck. The reflected head of the rectus femoris was identified and preserved. At this point, a capsulotomy was performed along the axis of iliocapsularis and then along the superior border of lateralis and then proximally along the intertrochanteric ridge to the saddle of the femoral neck. The capsule was tagged with two # 2 ethibond sutures. The Aufranc retractors were now placed intracapsular and the head-neck junction was exposed.  Under direct visualization, a subcapital osteotomy was performed with a sagittal saw and the head was removed with a corkscrew.  The capsule was then released posterosuperiorly being careful to identify the interval between minimus and capsule and releasing around the superior capsular insertion from the femur.       The acetabulum was now visualized with the placement of retractors: Aufranc retractor over the posterior acetabulum, a C-retractor over anterior acetabulum. An episiotomy was made in the inferior capsule and straight Homann placed inferior, posterior acetabulum. At this point, the bovie was used to excise the labrum and the cotyloid fossa.       At this point, with a reamer, the acetabulum was medialized and then deepened sequentially up to the final size reamer. Fluoroscopy was used to assess the position of the reamer. Next, the final 58 mm acetabular component was impacted into place. This was done with the assistance of fluoroscopic guidance and position was confirmed anatomically. A 25 mm screw was placed for supplemental fixation.  Once this was accomplished, the final neutral 40 mm polyethylene liner was impacted into place. The osteophytes were removed using an osteotome.       Next, the attention was turned to the femur. The acetabular retractors were removed. A HANA hook was placed around the proximal femur at the level of the G max insertion and lesser trochanter. The curved pointed Hohmann was placed over the greater trochanter laterally and a femoral  retractor placed at the inferomedial neck. The conjoint tendon was visualized and released with the bovie cautery to expose the inner aspect of the greater trochanter. The piriformis and obturator externus tendons were preserved. The leg was then hyperextended and adducted slightly.  With this done, the midas roxanne followed by canal finder was used to access in the intramedullary canal. This was followed by sequential broaching up to the final size consistent with the preoperative template. With the broach left in place and after calcar planning, the neck and trials were placed. The hip was reduced and found to be stable. Leg length, offset and implant size and position were assessed clinically and radiographically, and these were found to be consistent with the preoperative template.       Afterwards, the hip was dislocated. The trial components were removed, and the final size stem was placed in the canal and impacted into place and was found to be stable. The actual 40+5 mm head was now impacted into place after cleaning and drying the trunnion. The hip was reduced with gentle traction and internal rotation. Final leg length was measured on X-ray.      At this point, the hip was copiously irrigated. A periarticular injection was performed. Then the tagging ethibond sutures were tied together to reapproximate the capsulotomy and a few #2 ethibonds were used to complete closure of the capsulotomy. Copious irrigation was used throughout the closure.  The fascia overlying the TFL was now closed using 0 vicryl sutures. The remainder of the wound was closed in layers using 0-vicryl, 3-0 vicryl and skin closed using 3-0 stratafix and dermabond for skin. Sterile mepilex dressings were applied and the patient was transferred from the operating room table to the bed and then transferred to the Recovery Room in stable condition. All sponge, needle, and instrument counts were correct at the end of the procedure. There were no  complications. A surgical debrief was performed at the end of the case.      Post-operative Plan:   Patient will be placed on anterior hip precautions and will mobilize with physical therapy. They will be started on aspirin for DVT prophylaxis along with mechanical compression devices. They will be discharged from the hospital when they have reached rehabilitation goals and their pain is controlled.      Attestation Statement:   I was present for and performed all critical portions of the procedure.       Fanta Christianson MD       Date: 3/29/2024  Time: 1:15 PM

## 2024-03-29 NOTE — ANESTHESIA PREPROCEDURE EVALUATION
Patient: Rafael Walter    Procedure Information       Anesthesia Start Date/Time: 03/29/24 0744    Procedure: Left Hip Total Arthroplasty ~  Anterior Approach (Left: Hip)    Location: [facility] A OR 07 / [facility] A OR    Surgeons: Fanta Christianson MD          74 yo M hx CLL in remission, Gout, claustrophobia/anxiety (laying flat can be a problem), HTN, chronic thrombocytopenia (130s)    Relevant Problems   Cardiac   (+) Benign essential hypertension   (+) Hyperlipidemia, unspecified   (+) Hypertension secondary to other renal disorders      Pulmonary   (+) Lung nodule, multiple      Neuro   (+) Panic attacks   (+) Panic disorder without agoraphobia      GI   (+) Chronic diarrhea      /Renal   (+) Benign enlargement of prostate      Hematology   (+) Chronic lymphocytic leukemia (CMS/HCC)      Musculoskeletal   (+) Primary osteoarthritis of right hip   (+) Unilateral primary osteoarthritis, left hip       Clinical information reviewed:   Tobacco  Allergies  Meds   Med Hx  Surg Hx   Fam Hx  Soc Hx        NPO Detail:  NPO/Void Status  Carbohydrate Drink Given Prior to Surgery? : N  Date of Last Liquid: 03/28/24  Time of Last Liquid: 0530  Date of Last Solid: 03/28/24  Time of Last Solid: 1930  Last Intake Type: Clear fluids  Time of Last Void: 0657         Physical Exam    Airway  Mallampati: II  TM distance: >3 FB  Neck ROM: full     Cardiovascular   Rate: normal     Dental   (+) upper dentures, lower dentures     Pulmonary - normal exam     Abdominal            Anesthesia Plan    History of general anesthesia?: yes  History of complications of general anesthesia?: no    ASA 3     MAC and spinal   (Nerve block for postop pain)  intravenous induction   Postoperative administration of opioids is intended.  Anesthetic plan and risks discussed with patient.

## 2024-03-29 NOTE — ANESTHESIA POSTPROCEDURE EVALUATION
Patient: Rafael Walter    Procedure Summary       Date: 03/29/24 Room / Location: [facility] OR 07 / [facility]    Anesthesia Start: 0744 Anesthesia Stop: 1024    Procedure: Left Hip Total Arthroplasty ~  Anterior Approach (Left: Hip) Diagnosis:       Unilateral primary osteoarthritis, left hip      (Unilateral primary osteoarthritis, left hip [M16.12])    Surgeons: Fanta Christianson MD Responsible Provider: Rafael Mai MD    Anesthesia Type: MAC, spinal ASA Status: 3            Anesthesia Type: MAC, spinal    Vitals Value Taken Time   /62 03/29/24 1131   Temp 36.3 °C (97.3 °F) 03/29/24 1015   Pulse 66 03/29/24 1131   Resp 12 03/29/24 1130   SpO2 100 % 03/29/24 1134   Vitals shown include unvalidated device data.    Anesthesia Post Evaluation    Patient participation: complete - patient participated  Level of consciousness: awake  Pain management: adequate  Airway patency: patent  Cardiovascular status: acceptable and hemodynamically stable  Respiratory status: acceptable  Hydration status: acceptable  Postoperative Nausea and Vomiting: none        No notable events documented.

## 2024-03-29 NOTE — SIGNIFICANT EVENT
Orthopaedic Surgery Treatment Plan    Assessment: 73 y.o. male s/p L JAC on 3/29/2024 with Dr. Christianson.      Plan:  - Weight bearing: WBAT LLE, anterior hip precautions  - DVT ppx: SCDs, ASA 81mg BID x3 doses to start on POD0 then eliquis 2.5mg BID x5 weeks  - Diet: Regular  - Pain: Tylenol, oxycodone 5/10, dilaudid for breakthrough  - Antibiotics: perioperative ancef 2g q8hr x3 doses then cefadroxil 500mg BID  - FEN: HLIV with good PO intake  - Bowel Regimen: Colace, senna, dulcolax  - PT/OT consulted  - Pulm: Encourage IS  - Continue home medications  - Glycemic: no issues   - Gallo: none   - Drain: none   - postop PO TXA  - PACU XR postop pelvis    Dispo: pending PT    Kalen Vargas MD  Orthopaedic Surgery, PGY-1  Epic Chat preferred    This patient will be followed by the orthopaedic on-call JAMIE (please refer to [facility])

## 2024-03-29 NOTE — ANESTHESIA PROCEDURE NOTES
Spinal Block    Start time: 3/29/2024 7:56 AM  End time: 3/29/2024 8:02 AM  Reason for block: primary anesthetic  Staffing  Performed: CRNA   Authorized by: Rafael Mai MD    Performed by: CAPRI Carolina-CRNA    Preanesthetic Checklist  Completed: patient identified, IV checked, risks and benefits discussed, surgical consent, monitors and equipment checked, pre-op evaluation, timeout performed and sterile techniques followed  Block Timeout  RN/Licensed healthcare professional reads aloud to the Anesthesia provider and entire team: Patient identity, procedure with side and site, patient position, and as applicable the availability of implants/special equipment/special requirements.  Patient on coagulant treatment: no  Timeout performed at: 3/29/2024 7:55 AM  Spinal Block  Patient position: sitting  Prep: Betadine  Sterility prep: cap, drape, gloves, hand hygiene and mask  Sedation level: light sedation  Patient monitoring: blood pressure, continuous pulse oximetry, ETCO2 and heart rate  Approach: midline  Vertebral space: L3-4  Injection technique: single-shot  Needle  Needle type: Pencan.   Needle gauge: 24 G  Needle length: 4 in  Free flowing CSF: yes    Assessment  Sensory level: T6  Block outcome: patient comfortable  Procedure assessment: patient sedated but conversant throughout procedure and patient tolerated procedure well with no immediate complications  Additional Notes  Patient IDD Q and A  sitting on cart monitors on back prepped with Betadine.  L-3-4 located Lidocaine skin wheal  #20 introducer #24 gauge pencan single shot easy.  +CSF  2ml of 0.75% Bupivacaine with dextrose instilled positive [place] level about T-6

## 2024-03-29 NOTE — PERIOPERATIVE NURSING NOTE
1015: Pt arrived to pacu sedated without signs or symptoms of distress, breathing regular and unlabored. Bedside report received from anesthesia and procedure team. Initial vital signs and assessment charted per flowsheets. Safety maintained  1025: Portable xray at bedside  1036: Family updated  1055: Pt tolerating water, denies nausea  1115: Report to 719 nurse  1125: Family updated  1135: Pt transported to room 719 in stable condition with all belongings. Safety maintained

## 2024-03-29 NOTE — PROGRESS NOTES
Physical Therapy    Physical Therapy Evaluation & Treatment    Patient Name: Rafael Watler  MRN: [id number]  Today's Date: 3/29/2024   Time Calculation  Start Time: 1349  Stop Time: 1436  Time Calculation (min): 47 min    Assessment/Plan   PT Assessment  PT Assessment Results: Decreased strength, Decreased range of motion, Decreased endurance, Impaired balance, Decreased mobility, Orthopedic restrictions, Pain  Rehab Prognosis: Good  End of Session Communication: Bedside nurse  Assessment Comment: Pt is POD # 0 s/p L JAC with post-op hip precautions and WBAT.  The pt presents with decreased safety & independence regarding bed mobility, transfers, and gait.  The patient is also unable to ascend/descend stairs and requires cueing regarding JAC precautions.  Contributing to these impairments are post-op pain, decreased L hip ROM & strength.  The patient would benefit from continued PT to address the above functional limitations & improve independence/safety with device.  Anticipate low frequency PT needs at discharge.  End of Session Patient Position: Up in chair, Alarm on   IP OR SWING BED PT PLAN  Inpatient or Swing Bed: Inpatient  PT Plan  Treatment/Interventions: Bed mobility, Transfer training, Gait training, Stair training, Balance training, Strengthening, Endurance training, Range of motion, Therapeutic exercise, Therapeutic activity, Home exercise program  PT Plan: Skilled PT  PT Frequency: BID  PT Discharge Recommendations: Moderate intensity level of continued care  Equipment Recommended upon Discharge:  (may need cane for stair negotiation)  PT Recommended Transfer Status: Assist x1  PT - OK to Discharge: Yes (PT POC established.)      Subjective     General Visit Information:  General  Reason for Referral: L JAC- Anterior Approach  Referred By: Kalen Vargas MD  Past Medical History Relevant to Rehab:   Past Medical History:   Diagnosis Date    Abnormal finding of blood chemistry, unspecified 11/28/2012     Abnormal blood chemistry    Anxiety     BPH (benign prostatic hyperplasia)     Claustrophobia     unable to lay flat    CLL (chronic lymphocytic leukemia) (CMS/[facility])     in remission, off oral meds x 2 years, last Hem/Onc visit 23-stable    Gout     History of splenomegaly     resolved per patient    Hyperlipidemia     Hypertension     Long-term current use of intravenous immunoglobulin (IVIG)     next treatment 3/25/24    Lung nodule     Lupus nephritis (CMS/[facility])     Osteoarthritis     Skin cancer 10/19/2014    left upper face    Vitamin D deficiency        Prior to Session Communication: Bedside nurse  Patient Position Received: Bed, 3 rail up, Alarm on  General Comment: Pt pleasant and agreeable to eval. Pt's wife and daughter present.  Home Living:  Home Living  Type of Home: House  Lives With: Spouse  Home Adaptive Equipment: Walker rolling or standard  Home Layout: One level  Home Access: Stairs to enter without rails  Entrance Stairs-Number of Steps: 3  Prior Level of Function:  Prior Function Per Pt/Caregiver Report  Level of [place]: Independent with ADLs and functional transfers, Independent with homemaking with ambulation  Receives Help From:  (wife will be available to assist)  ADL Assistance: Independent  Homemaking Assistance: Independent  Ambulatory Assistance: Independent  Prior Function Comments: Denies any recent falls  Precautions:  Precautions  Hearing/Visual Limitations: hard of hearing  LE Weight Bearing Status: Weight Bearing as Tolerated  Medical Precautions: Fall precautions  Post-Surgical Precautions: Left hip precautions (Anterior)  Vital Signs:  Vital Signs  BP: 123/62 (Sittin/67     Standin/59. RN)  BP Location: Left arm  BP Method: Automatic  Patient Position: Lying    Objective   Pain:  Pain Assessment  Pain Assessment: 0-10  Pain Score: 4  Pain Type: Surgical pain  Pain Location: Hip  Pain Orientation: Left  Cognition:  Cognition  Overall Cognitive Status:  Within Functional Limits  Orientation Level: Oriented X4    General Assessments:  Activity Tolerance  Endurance: Tolerates 30 min exercise with multiple rests    Sensation  Light Touch:  (Mild numbness around incision)    Postural Control  Postural Control: Within Functional Limits    Static Sitting Balance  Static Sitting-Balance Support: Feet supported, Bilateral upper extremity supported  Static Sitting-Level of Assistance: Close supervision  Dynamic Sitting Balance  Dynamic Sitting-Balance Support: Feet supported, Bilateral upper extremity supported  Dynamic Sitting-Comments: Close supervision    Static Standing Balance  Static Standing-Balance Support: Bilateral upper extremity supported  Static Standing-Level of Assistance: Contact guard  Dynamic Standing Balance  Dynamic Standing-Balance Support: Bilateral upper extremity supported  Dynamic Standing-Comments: Contact guard  Functional Assessments:  Bed Mobility  Bed Mobility: Yes  Bed Mobility 1  Bed Mobility 1: Supine to sitting  Level of Assistance 1: Close supervision  Bed Mobility Comments 1: HOB elevated. Uses hands to assist maneuvering L LE.    Transfers  Transfer: Yes  Transfer 1  Technique 1: Sit to stand, Stand to sit  Transfer Device 1: Walker  Transfer Level of Assistance 1: Contact guard  Trials/Comments 1: Cues for hand placement and to extend L LE forward prior to sitting.    Ambulation/Gait Training  Ambulation/Gait Training Performed: Yes  Ambulation/Gait Training 1  Surface 1: Level tile  Device 1: Rolling walker  Assistance 1: Contact guard  Comments/Distance (ft) 1: 10 ft. Pt ambulates with decreased jazlyn and step length. Mildly antalgic gait. Cues for sequencing and walker placement.  Extremity/Trunk Assessments:  RUE   RUE : Within Functional Limits  LUE   LUE: Within Functional Limits  RLE   RLE : Within Functional Limits  LLE   LLE :  (hip ROM limited due to pain. Strength >3/5 based on observation of functional  mobility.)  Treatments:  Therapeutic Exercise  Therapeutic Exercise Performed: Yes  Therapeutic Exercise Activity 1: Pt completes each of the following x15 reps in order to improve strength and endurance: ankle pumps, glute sets, quad sets, heel slides, SAQs, LAQs, supine hip abduction.    Therapeutic Activity  Therapeutic Activity Performed: Yes  Therapeutic Activity 1: Facilitated safe transfer technique with cues for hand placement, foot placement, scooting to the EOB, and anterior weight shifting. Cues for ambulation including walker placement and sequencing. Pt educated on anterior hip precautions with handout administered.  Outcome Measures:  [facility] Basic Mobility  Turning from your back to your side while in a flat bed without using bedrails: A little  Moving from lying on your back to sitting on the side of a flat bed without using bedrails: A little  Moving to and from bed to chair (including a wheelchair): A little  Standing up from a chair using your arms (e.g. wheelchair or bedside chair): A little  To walk in hospital room: A little  Climbing 3-5 steps with railing: A lot  Basic Mobility - Total Score: 17    Encounter Problems       Encounter Problems (Active)       Mobility       STG - Patient will ambulate 150 ft mod I with a RW.        Start:  03/29/24    Expected End:  04/12/24            STG - Patient will ascend and descend 3 stairs using LRAD mod I.        Start:  03/29/24    Expected End:  04/12/24            Pt will tolerate 15 reps of each LE exercise in order to improve strength and endurance.        Start:  03/29/24    Expected End:  04/12/24               PT Transfers       STG - Patient will perform bed mobility independently.        Start:  03/29/24    Expected End:  04/12/24            STG - Patient will transfer sit to and from stand mod I with a RW.        Start:  03/29/24    Expected End:  04/12/24            STG - Patient will perform car transfer mod I.        Start:  03/29/24     Expected End:  04/12/24               Safety       LTG - Patient will adhere to anterior hip precautions during ADL's and transfers independently.        Start:  03/29/24    Expected End:  04/12/24                   Education Documentation  Handouts, taught by Paula Cheng, PT at 3/29/2024  3:39 PM.  Learner: Patient  Readiness: Acceptance  Method: Explanation  Response: Verbalizes Understanding    Precautions, taught by Paula Cheng, PT at 3/29/2024  3:39 PM.  Learner: Patient  Readiness: Acceptance  Method: Explanation  Response: Verbalizes Understanding    Body Mechanics, taught by Paula Cheng, PT at 3/29/2024  3:39 PM.  Learner: Patient  Readiness: Acceptance  Method: Explanation  Response: Verbalizes Understanding    Home Exercise Program, taught by Paula Cheng, PT at 3/29/2024  3:39 PM.  Learner: Patient  Readiness: Acceptance  Method: Explanation  Response: Verbalizes Understanding    Mobility Training, taught by Paula Cheng, PT at 3/29/2024  3:39 PM.  Learner: Patient  Readiness: Acceptance  Method: Explanation  Response: Verbalizes Understanding    Education Comments  No comments found.

## 2024-03-29 NOTE — INTERVAL H&P NOTE
History and Physical Update:  I have reviewed the history and physical dated 3/26/24. History and physical reviewed and relevant findings noted.     Patient examined to review pertinent physical findings: No significant changes.   Home medications reviewed: No significant changes.   Allergies reviewed: No significant changes.     ERAS (Enhanced recovery after surgery): yes, JOANN Christianson MD    Date: 3/29/2024  Time: 6:49 AM        Patient Active Problem List   Diagnosis    Atrophy of muscle of thigh    Muscular deconditioning    Benign enlargement of prostate    Benign essential hypertension    Bilateral knee pain    Bursitis of left hip    Chronic diarrhea    Chronic lymphocytic leukemia (CMS/HCC)    Gout    Hematoma of spleen, closed    Hematospermia    Hyperlipidemia, unspecified    Hypertension secondary to other renal disorders    Leg pain    Lower leg edema    Lung nodule, multiple    LVH (left ventricular hypertrophy) due to hypertensive disease    Male erectile disorder of organic origin    Muscle weakness (generalized)    Pain in joint involving pelvic region and thigh, bilateral    Panic attacks    Panic disorder without agoraphobia    Benign skin lesion of multiple sites    Psoriasis    Right inguinal hernia    Splenomegaly    Tubular adenoma of colon    Vitamin D deficiency, unspecified    Right hip pain    Primary osteoarthritis of right hip    Unilateral primary osteoarthritis, left hip       Current Outpatient Medications   Medication Instructions    acetaminophen (TYLENOL) 1,000 mg, oral, Every 8 hours    ALPRAZolam (XANAX) 0.5 mg, oral, Daily PRN    apixaban (ELIQUIS) 2.5 mg, oral, 2 times daily, Start morning of postoperative day 2 and continue for 5 weeks    cholecalciferol (VITAMIN D3) 25 mcg, oral, Daily    diphenhydrAMINE (SOMINEX) 25 mg, oral, Nightly PRN    docusate sodium (COLACE) 100 mg, oral, 2 times daily    losartan (COZAAR) 50 mg, oral, Daily    naproxen (NAPROSYN) 500  "mg, oral, 2 times daily PRN    ondansetron (ZOFRAN) 4 mg, oral, Every 8 hours PRN    pantoprazole (PROTONIX) 40 mg, oral, Daily before breakfast, Do not crush, chew, or split.    sennosides (SENOKOT) 8.6 mg, oral, Daily    sildenafil (VIAGRA) 50 mg, oral, Daily PRN, 1 HOUR BEFORE SEXUAL ACTIVITY       No Known Allergies    Lab Results   Component Value Date    WBC 5.1 03/26/2024    HGB 14.5 03/26/2024    HCT 45.1 03/26/2024    MCV 86 03/26/2024     (L) 03/26/2024     Lab Results   Component Value Date    INR 1.4 (H) 10/07/2019    INR 1.3 (H) 10/04/2019    INR 1.2 (H) 10/02/2019    PROTIME 15.8 (H) 10/07/2019    PROTIME 14.3 (H) 10/04/2019    PROTIME 13.5 (H) 10/02/2019       Lab Results   Component Value Date    GLUCOSE 94 03/26/2024    CALCIUM 8.8 03/26/2024     03/26/2024    K 3.4 (L) 03/26/2024    CO2 29 03/26/2024     03/26/2024    BUN 21 03/26/2024    CREATININE 0.88 03/26/2024     Lab Results   Component Value Date    CKTOTAL 30 04/21/2023    TROPONINI <0.02 08/02/2019     No results found for: \"HGBA1C\"      Lab Results   Component Value Date    CRP 15.17 (A) 07/29/2019     Lab Results   Component Value Date    SEDRATE 10 01/18/2019         "

## 2024-03-30 ENCOUNTER — DOCUMENTATION (OUTPATIENT)
Dept: HOME HEALTH SERVICES | Facility: HOME HEALTH | Age: 74
End: 2024-03-30
Payer: MEDICARE

## 2024-03-30 VITALS
SYSTOLIC BLOOD PRESSURE: 115 MMHG | OXYGEN SATURATION: 97 % | DIASTOLIC BLOOD PRESSURE: 51 MMHG | TEMPERATURE: 98.1 F | BODY MASS INDEX: 22.72 KG/M2 | WEIGHT: 182.76 LBS | HEIGHT: 75 IN | RESPIRATION RATE: 18 BRPM | HEART RATE: 64 BPM

## 2024-03-30 LAB
ANION GAP SERPL CALC-SCNC: 11 MMOL/L (ref 10–20)
BUN SERPL-MCNC: 22 MG/DL (ref 6–23)
CALCIUM SERPL-MCNC: 8.1 MG/DL (ref 8.6–10.3)
CHLORIDE SERPL-SCNC: 104 MMOL/L (ref 98–107)
CO2 SERPL-SCNC: 24 MMOL/L (ref 21–32)
CREAT SERPL-MCNC: 0.84 MG/DL (ref 0.5–1.3)
EGFRCR SERPLBLD CKD-EPI 2021: >90 ML/MIN/1.73M*2
ERYTHROCYTE [DISTWIDTH] IN BLOOD BY AUTOMATED COUNT: 14.7 % (ref 11.5–14.5)
GLUCOSE SERPL-MCNC: 141 MG/DL (ref 74–99)
HCT VFR BLD AUTO: 32.4 % (ref 41–52)
HGB BLD-MCNC: 10.7 G/DL (ref 13.5–17.5)
MCH RBC QN AUTO: 27.9 PG (ref 26–34)
MCHC RBC AUTO-ENTMCNC: 33 G/DL (ref 32–36)
MCV RBC AUTO: 84 FL (ref 80–100)
NRBC BLD-RTO: 0 /100 WBCS (ref 0–0)
PLATELET # BLD AUTO: 130 X10*3/UL (ref 150–450)
POTASSIUM SERPL-SCNC: 4.1 MMOL/L (ref 3.5–5.3)
RBC # BLD AUTO: 3.84 X10*6/UL (ref 4.5–5.9)
SODIUM SERPL-SCNC: 135 MMOL/L (ref 136–145)
WBC # BLD AUTO: 10.1 X10*3/UL (ref 4.4–11.3)

## 2024-03-30 PROCEDURE — 7100000011 HC EXTENDED STAY RECOVERY HOURLY - NURSING UNIT

## 2024-03-30 PROCEDURE — 9420000001 HC RT PATIENT EDUCATION 5 MIN

## 2024-03-30 PROCEDURE — 2500000001 HC RX 250 WO HCPCS SELF ADMINISTERED DRUGS (ALT 637 FOR MEDICARE OP)

## 2024-03-30 PROCEDURE — 80048 BASIC METABOLIC PNL TOTAL CA: CPT

## 2024-03-30 PROCEDURE — 97165 OT EVAL LOW COMPLEX 30 MIN: CPT | Mod: GO

## 2024-03-30 PROCEDURE — 36415 COLL VENOUS BLD VENIPUNCTURE: CPT

## 2024-03-30 PROCEDURE — 2500000004 HC RX 250 GENERAL PHARMACY W/ HCPCS (ALT 636 FOR OP/ED)

## 2024-03-30 PROCEDURE — 97116 GAIT TRAINING THERAPY: CPT | Mod: GP,CQ

## 2024-03-30 PROCEDURE — 85027 COMPLETE CBC AUTOMATED: CPT

## 2024-03-30 PROCEDURE — 97530 THERAPEUTIC ACTIVITIES: CPT | Mod: GP,CQ

## 2024-03-30 PROCEDURE — 97535 SELF CARE MNGMENT TRAINING: CPT | Mod: GO

## 2024-03-30 RX ADMIN — ASPIRIN 81 MG: 81 TABLET, COATED ORAL at 09:02

## 2024-03-30 RX ADMIN — KETOROLAC TROMETHAMINE 15 MG: 30 INJECTION, SOLUTION INTRAMUSCULAR at 04:29

## 2024-03-30 RX ADMIN — PANTOPRAZOLE SODIUM 40 MG: 40 TABLET, DELAYED RELEASE ORAL at 06:20

## 2024-03-30 RX ADMIN — ACETAMINOPHEN 650 MG: 325 TABLET ORAL at 05:42

## 2024-03-30 RX ADMIN — KETOROLAC TROMETHAMINE 15 MG: 30 INJECTION, SOLUTION INTRAMUSCULAR at 09:02

## 2024-03-30 RX ADMIN — OXYCODONE HYDROCHLORIDE 10 MG: 5 TABLET ORAL at 04:30

## 2024-03-30 ASSESSMENT — COGNITIVE AND FUNCTIONAL STATUS - GENERAL
DAILY ACTIVITIY SCORE: 20
TOILETING: A LITTLE
PERSONAL GROOMING: A LITTLE
HELP NEEDED FOR BATHING: A LITTLE
DRESSING REGULAR LOWER BODY CLOTHING: A LITTLE
MOVING TO AND FROM BED TO CHAIR: A LITTLE
MOBILITY SCORE: 22
CLIMB 3 TO 5 STEPS WITH RAILING: A LITTLE

## 2024-03-30 ASSESSMENT — PAIN - FUNCTIONAL ASSESSMENT
PAIN_FUNCTIONAL_ASSESSMENT: 0-10

## 2024-03-30 ASSESSMENT — PAIN SCALES - GENERAL
PAINLEVEL_OUTOF10: 1
PAINLEVEL_OUTOF10: 0 - NO PAIN
PAINLEVEL_OUTOF10: 8
PAINLEVEL_OUTOF10: 0 - NO PAIN
PAINLEVEL_OUTOF10: 3
PAINLEVEL_OUTOF10: 0 - NO PAIN
PAINLEVEL_OUTOF10: 3

## 2024-03-30 ASSESSMENT — PAIN DESCRIPTION - DESCRIPTORS
DESCRIPTORS: ACHING
DESCRIPTORS: ACHING;CRAMPING;SHARP;SORE
DESCRIPTORS: ACHING

## 2024-03-30 ASSESSMENT — ACTIVITIES OF DAILY LIVING (ADL)
BATHING_ASSISTANCE: MINIMAL
HOME_MANAGEMENT_TIME_ENTRY: 24

## 2024-03-30 NOTE — PROGRESS NOTES
Physical Therapy    Physical Therapy Treatment    Patient Name: Rafael Walter  MRN: [id number]  Today's Date: 3/30/2024  Time Calculation  Start Time: 0903  Stop Time: 0931  Time Calculation (min): 28 min       Assessment/Plan   PT Assessment  PT Assessment Results: Decreased strength, Decreased range of motion, Decreased endurance, Impaired balance, Decreased mobility, Orthopedic restrictions, Pain  Rehab Prognosis: Good  End of Session Communication: Bedside nurse  Assessment Comment: Pt tolerated session well. Pt is CLose SUPV for all aspects of functioanl mobility. Pt is demonstratingincreased overall functional mobility and enduracne. Pt is safe for home going upon discharge.  End of Session Patient Position: Up in chair, Alarm on     PT Plan  Treatment/Interventions: Bed mobility, Transfer training, Gait training, Stair training, Balance training, Strengthening, Endurance training, Range of motion, Therapeutic exercise, Therapeutic activity, Home exercise program  PT Plan: Skilled PT  PT Frequency: BID  PT Discharge Recommendations: Low intensity level of continued care (DPT addressed and agrees to discharge recommendation from MOD  to LOW.)  Equipment Recommended upon Discharge:  (may need cane for stair negotiation)  PT Recommended Transfer Status: Assist x1  PT - OK to Discharge: Yes      General Visit Information:   PT  Visit  PT Received On: 03/30/24  General  Reason for Referral: L JAC- Anterior Approach  Referred By: Kalen Vargas MD  Prior to Session Communication: Bedside nurse  Patient Position Received: Bed, 3 rail up, Alarm on  General Comment: Pt agreeable to therapy    Subjective   Precautions:  Precautions  Hearing/Visual Limitations: hard of hearing  LE Weight Bearing Status: Weight Bearing as Tolerated  Medical Precautions: Fall precautions  Post-Surgical Precautions: Left hip precautions  Vital Signs:       Objective   Pain:  Pain Assessment  Pain Assessment: 0-10  Pain Score: 3  Pain Type:  Surgical pain  Pain Location: Hip  Pain Orientation: Left  Pain Descriptors: Aching  Pain Frequency: Constant/continuous  Cognition:     Postural Control:       Activity Tolerance:  Activity Tolerance  Endurance: Tolerates 30 min exercise with multiple rests  Treatments:  Bed Mobility  Bed Mobility: Yes  Bed Mobility 1  Bed Mobility 1: Supine to sitting  Level of Assistance 1: Close supervision  Bed Mobility Comments 1: HOB slightly elevated light use bed features    Ambulation/Gait Training  Ambulation/Gait Training Performed: Yes  Ambulation/Gait Training 1  Surface 1: Level tile  Device 1: Rolling walker  Assistance 1: Close supervision  Comments/Distance (ft) 1: [id number]  Transfers  Transfer: Yes  Transfer 1  Technique 1: Sit to stand, Stand to sit  Transfer Device 1: Walker  Transfer Level of Assistance 1: Close supervision  Trials/Comments 1: cues for hand palcement safety    Stairs  Stairs: Yes (Pt able to ascend/descend flight of stairs)    Outcome Measures:  [facility] Basic Mobility  Turning from your back to your side while in a flat bed without using bedrails: None  Moving from lying on your back to sitting on the side of a flat bed without using bedrails: None  Moving to and from bed to chair (including a wheelchair): A little  Standing up from a chair using your arms (e.g. wheelchair or bedside chair): None  To walk in hospital room: None  Climbing 3-5 steps with railing: A little  Basic Mobility - Total Score: 22    Education Documentation  No documentation found.  Education Comments  No comments found.        OP EDUCATION:       Encounter Problems       Encounter Problems (Active)       Mobility       STG - Patient will ambulate 150 ft mod I with a RW.        Start:  03/29/24    Expected End:  04/12/24            STG - Patient will ascend and descend 3 stairs using LRAD mod I.        Start:  03/29/24    Expected End:  04/12/24            Pt will tolerate 15 reps of each LE exercise in order to improve  strength and endurance.        Start:  03/29/24    Expected End:  04/12/24               PT Transfers       STG - Patient will perform bed mobility independently.        Start:  03/29/24    Expected End:  04/12/24            STG - Patient will transfer sit to and from stand mod I with a RW.        Start:  03/29/24    Expected End:  04/12/24            STG - Patient will perform car transfer mod I.        Start:  03/29/24    Expected End:  04/12/24               Safety       LTG - Patient will adhere to anterior hip precautions during ADL's and transfers independently.        Start:  03/29/24    Expected End:  04/12/24

## 2024-03-30 NOTE — DISCHARGE SUMMARY
Discharge Diagnosis  Unilateral primary osteoarthritis, left hip    Issues Requiring Follow-Up  S/p L total hip arthroplasty, direct anterior approach     Test Results Pending At Discharge  Pending Labs       No current pending labs.            Hospital Course   73 year-old male who presented with left hip end stage osteoarthritis. Patient is now s/p L JAC DA on 3/29/2024 with Dr. Christianson. On the day of surgery, patient was identified in the pre-operative holding area and agreeable to proceed with surgery. Written consent was obtained.  Please see operative note for further details of this procedure. Patient received 24 hours of jennifer-operative antibiotics. Patient recovered in the PACU before transfer to a regular nursing floor. Patient was started on oxycodone and tylenol for pain control and  ASA 81 mg bid for DVT prophylaxis. Physical therapy recommended continued recovery at home with continued physical therapy and wound care. On the day of discharge, patient was afebrile with stable vital signs. Patient was neurovascularly intact at time of discharge. Patient was discharged with prescription of aspirin 81 mg BID for DVT prophylaxis for 4 weeks. Patient will follow-up with Dr. Christianson in 2 weeks for post-operative visit.      Pertinent Physical Exam At Time of Discharge  Physical Exam  Constitutional: NAD  HEENT: hearing and vision grossly intact, MMM  Resp: breathing comfortably   CV: extremities warm, well perfused  Neuro: alert, sensory and motor grossly intact  Psych: Appropriate mood and affect    Home Medications     Medication List      START taking these medications     aspirin 81 mg chewable tablet; Chew 1 tablet (81 mg) 2 times a day for 3   doses. Start postoperative day 0 and continue for 3 doses   cefadroxil 500 mg capsule; Commonly known as: Duricef; Take 1 capsule   (500 mg) by mouth 2 times a day for 7 days.   docusate sodium 100 mg capsule; Commonly known as: Colace; Take 1   capsule (100  mg) by mouth 2 times a day for 15 days.   Eliquis 2.5 mg tablet; Generic drug: apixaban; Take 1 tablet (2.5 mg) by   mouth 2 times a day for 35 doses. Start morning of postoperative day 2 and   continue for 5 weeks   ondansetron 4 mg tablet; Commonly known as: Zofran; Take 1 tablet (4 mg)   by mouth every 8 hours if needed for nausea or vomiting.   oxyCODONE 5 mg immediate release tablet; Commonly known as: Roxicodone;   Take 1-2 tablets (5-10 mg) by mouth every 6 hours if needed for severe   pain (7 - 10) for up to 7 days.   Pain Relief ES (acetaminophen) 500 mg tablet; Generic drug:   acetaminophen; Take 2 tablets (1,000 mg) by mouth every 8 hours.   pantoprazole 40 mg EC tablet; Commonly known as: ProtoNix; Take 1 tablet   (40 mg) by mouth once daily in the morning. Take before meals. Do not   crush, chew, or split.   senna 8.6 mg tablet; Generic drug: sennosides; Take 1 tablet (8.6 mg) by   mouth once daily for 15 days.   traMADol 50 mg tablet; Commonly known as: Ultram; Take 1-2 tablets   ( mg) by mouth every 6 hours if needed for severe pain (7 - 10) for   up to 7 days.     CONTINUE taking these medications     ALPRAZolam 0.5 mg tablet; Commonly known as: Xanax   diphenhydrAMINE 25 mg tablet; Commonly known as: Sominex   losartan 50 mg tablet; Commonly known as: Cozaar; Take 1 tablet (50 mg)   by mouth once daily.   naproxen 500 mg tablet; Commonly known as: Naprosyn; TAKE 1 TABLET (500   MG) BY MOUTH 2 TIMES A DAY AS NEEDED FOR MILD PAIN (1 - 3) (PAIN).   sildenafil 50 mg tablet; Commonly known as: Viagra; Take 1 tablet (50   mg) by mouth once daily as needed for erectile dysfunction. 1 HOUR BEFORE   SEXUAL ACTIVITY   Vitamin D3 25 MCG (1000 UT) capsule; Generic drug: cholecalciferol     STOP taking these medications     chlorhexidine 0.12 % solution; Commonly known as: Peridex       Outpatient Follow-Up  Future Appointments   Date Time Provider Department Center   4/10/2024 10:20 AM Fanta Christianson  MD [id number] [facility]   5/10/2024 11:20 AM Cecilio Luciano MD PhD [id number] [facility]   9/9/2024  9:30 AM Milo Gold DO [id number] [place]       MD Fanta Stockton MD  Attending Surgeon

## 2024-03-30 NOTE — CARE PLAN
Problem: COGNITION/SAFETY  Goal: Patient will recall and adhere to hip precautions during all functional mobility/ADL tasks in order to demonstrate improved understanding and promote healing post op  Outcome: Met     Problem: ADLs  Goal: Patient with complete lower body dressing with minimal assist  level of assistance donning and doffing all LE clothes  with reacher, shoe horn, and sock-aid while seated/standing PRN  Outcome: Met

## 2024-03-30 NOTE — PROGRESS NOTES
Occupational Therapy    Evaluation/Treatment    Patient Name: Rafael Walter  MRN: [id number]  : 1950  Today's Date: 24  Time Calculation  Start Time: 1014  Stop Time: 1053  Time Calculation (min): 39 min       Assessment:  OT Assessment: impaired Adl's  Prognosis: Excellent  Barriers to Discharge: None  Evaluation/Treatment Tolerance: Patient tolerated treatment well  End of Session Communication: Bedside nurse  End of Session Patient Position: Up in chair, Alarm off, not on at start of session  OT Assessment Results: Decreased ADL status, Decreased safe judgment during ADL  Prognosis: Excellent  Barriers to Discharge: None  Evaluation/Treatment Tolerance: Patient tolerated treatment well  Strengths: Ability to acquire knowledge  Plan:  Treatment Interventions: ADL retraining, Functional transfer training, Endurance training, Patient/family training, Equipment evaluation/education, Compensatory technique education  OT Frequency: One time follow up visit  OT Discharge Recommendations: No further acute OT  Equipment Recommended upon Discharge: Other (comment) (LE AE)  OT Recommended Transfer Status: Stand by assist, Assist of 1  OT - OK to Discharge: Yes  Treatment Interventions: ADL retraining, Functional transfer training, Endurance training, Patient/family training, Equipment evaluation/education, Compensatory technique education    Subjective   Current Problem:  1. Unilateral primary osteoarthritis, left hip  cefadroxil (Duricef) 500 mg capsule    aspirin 81 mg chewable tablet    apixaban (Eliquis) 2.5 mg tablet    docusate sodium (Colace) 100 mg capsule    oxyCODONE (Roxicodone) 5 mg immediate release tablet    traMADol (Ultram) 50 mg tablet    sennosides (Senokot) 8.6 mg tablet    pantoprazole (ProtoNix) 40 mg EC tablet    acetaminophen (Tylenol) 500 mg tablet    ondansetron (Zofran) 4 mg tablet    Referral to Home Health    FL less than 1 hour    FL less than 1 hour    XR pelvis 1-2 views    XR  pelvis 1-2 views        General:   OT Received On: 03/30/24  General  Reason for Referral: L JAC  Prior to Session Communication: Bedside nurse  Patient Position Received: Up in chair, Alarm off, not on at start of session  General Comment: Pt agreeable to session  Precautions:  Hearing/Visual Limitations: [place]  LE Weight Bearing Status: Weight Bearing as Tolerated  Post-Surgical Precautions: Left hip precautions  Precautions Comment: JAC packet reviewed that was in folder bedside  Vital Signs:     Pain:  Pain Assessment  Pain Assessment: 0-10  Pain Score: 0 - No pain  Pain Location: Hip  Pain Orientation: Left    Objective   Cognition:  Orientation Level: Oriented X4           Home Living:  Type of Home: House  Lives With: Spouse  Home Layout: Able to live on main level with bedroom/bathroom  Entrance Stairs-Number of Steps: 3  Bathroom Shower/Tub: Walk-in shower  Bathroom Toilet: Handicapped height  Bathroom Equipment: Built-in shower seat, Grab bars in shower  Prior Function:  Level of [place]: Independent with ADLs and functional transfers, Independent with homemaking with ambulation  Prior Function Comments: still working as , spouse works FT  IADL History:     ADL:  Eating Assistance: Independent  Grooming Assistance: Stand by  Bathing Assistance: Minimal  UE Dressing Assistance: Independent  LE Dressing Assistance: Maximal  Functional Assistance: Stand by  Activities of Daily Living:                        LE Dressing  LE Dressing: Yes  LE Dressing Adaptive Equipment: Reacher, Sock aide, Shoe horn  Pants Level of Assistance: Setup, Close supervision  Sock Level of Assistance: Setup, Close supervision, Minimal verbal cues  Shoe Level of Assistance: Minimum assistance, Minimal verbal cues  Compression Hose Level of Assistance: Dependent  LE Dressing Where Assessed: Chair  LE Dressing Comments: requires min v/c for adherence to JAC precautions and shows good carry over for proper use of LE AE  after education       Activity Tolerance:     Functional Standing Tolerance:     Bed Mobility/Transfers: Transfers  Transfer: Yes  Transfers 2  Transfer Device 2: Walker  Transfer Level of Assistance 2: Close supervision  Trials/Comments 2: sit to stand/simulated Adl transfers    Toilet Transfers  Toilet Transfers: Supervision  Toilet Transfers Comments: simulated Adl transfers with FWW    Modalities:     IADL's:   Splinting:     Casting:     Therapy/Activity:         Sensory:     Cognitive Skill Development:     Community/Work Reintegration Training:     Community Mobility:     Vision:  Sensation:     Strength:     Other Activity:     Home Environment:     Perception:     Coordination:      Hand Function:     Extremities:       Outcome Measures: [facility] Daily Activity  Putting on and taking off regular lower body clothing: A little  Bathing (including washing, rinsing, drying): A little  Putting on and taking off regular upper body clothing: None  Toileting, which includes using toilet, bedpan or urinal: A little  Taking care of personal grooming such as brushing teeth: A little  Eating Meals: None  Daily Activity - Total Score: 20        Education Documentation  Precautions, taught by Carmen Dillard OT at 3/30/2024 12:35 PM.  Learner: Patient  Readiness: Eager  Method: Explanation, Demonstration  Response: Verbalizes Understanding, Needs Reinforcement    ADL Training, taught by Carmen Dillard OT at 3/30/2024 12:35 PM.  Learner: Patient  Readiness: Eager  Method: Explanation, Demonstration  Response: Verbalizes Understanding, Needs Reinforcement    Education Comments  No comments found.        OP EDUCATION:       Goals:  Encounter Problems       Encounter Problems (Resolved)       ADLs       Patient with complete lower body dressing with minimal assist  level of assistance donning and doffing all LE clothes  with reacher, shoe horn, and sock-aid while seated/standing PRN (Met)       Start:  03/30/24     Expected End:  03/30/24    Resolved:  03/30/24            COGNITION/SAFETY       Patient will recall and adhere to hip precautions during all functional mobility/ADL tasks in order to demonstrate improved understanding and promote healing post op (Met)       Start:  03/30/24    Expected End:  03/30/24    Resolved:  03/30/24

## 2024-03-30 NOTE — CARE PLAN
The patient's goals for the shift include      The clinical goals for the shift include manage patient's pain and maintain patient's safety throughout the shift    Over the shift, the patient did not make progress toward the following goals. Barriers to progression include   Problem: Pain  Goal: Takes deep breaths with improved pain control throughout the shift  Outcome: Progressing  Goal: Turns in bed with improved pain control throughout the shift  Outcome: Progressing  Goal: Walks with improved pain control throughout the shift  Outcome: Progressing  Goal: Performs ADL's with improved pain control throughout shift  Outcome: Progressing  Goal: Participates in PT with improved pain control throughout the shift  Outcome: Progressing  Goal: Free from opioid side effects throughout the shift  Outcome: Progressing  Goal: Free from acute confusion related to pain meds throughout the shift  Outcome: Progressing     Problem: Skin  Goal: Promote/optimize nutrition  Outcome: Progressing  Goal: Promote skin healing  Outcome: Progressing   . Recommendations to address these barriers include .

## 2024-03-30 NOTE — HH CARE COORDINATION
Home Care received a Referral for Physical Therapy and Occupational Therapy. We have processed the referral for a Start of Care on 3.31.2024 (Ortho).     If you have any questions or concerns, please feel free to contact us at [phone]. Follow the prompts, enter your five digit zip code, and you will be directed to your care team on [facility] 2.

## 2024-03-30 NOTE — CARE PLAN
Problem: Skin  Goal: Promote/optimize nutrition  3/30/2024 1126 by Pam Lemus RN  Outcome: Progressing  3/30/2024 1124 by Pam Lemus RN  Outcome: Progressing  Goal: Promote skin healing  3/30/2024 1126 by Pam Lemus RN  Outcome: Progressing  3/30/2024 1124 by Pam Lemus RN  Outcome: Progressing   The patient's goals for the shift include      The clinical goals for the shift include safety    Over the shift, the patient did not make progress toward the following goals. Barriers to progression include . Recommendations to address these barriers include   Problem: Skin  Goal: Promote/optimize nutrition  3/30/2024 1126 by Pam Lemus RN  Outcome: Progressing  3/30/2024 1124 by Pam Lemus RN  Outcome: Progressing  Goal: Promote skin healing  3/30/2024 1126 by Pam Lemus RN  Outcome: Progressing  3/30/2024 1124 by Pam Lemus RN  Outcome: Progressing   .

## 2024-03-30 NOTE — PROGRESS NOTES
"Rafael Walter is a 73 y.o. male on day 0 of admission presenting with Unilateral primary osteoarthritis, left hip.    Orthopaedic Surgery Progress Note    S:  No acute overnight events.  Pain controlled on current regimen.  Denies any new onset numbness, tingling or weakness. Denies nausea, vomiting, chest pain, dyspnea, or calf tenderness. Denies any fever or chills.  Urinating and passing gas without difficulty.   Denies headache and photophobia. Motivated to go home today pending PT.       O:  /51 (Patient Position: Lying)   Pulse 67   Temp (!) 2.8 °C (37 °F) (Oral)   Resp 18   Ht 1.905 m (6' 3\")   Wt 82.9 kg (182 lb 12.2 oz)   SpO2 96%   BMI 22.84 kg/m²     Constitutional: NAD  HEENT: hearing and vision grossly intact, MMM  Resp: breathing comfortably   CV: extremities warm, well perfused  Neuro: alert, sensory and motor grossly intact  Psych: Appropriate mood and affect      LLE:   - Dressing in place, clean and dry  -Tender at site of injury  -Motor intact in DF/PF/EHL/FHL  -SILT in saph/sural/SPN/DPN distributions  -Foot warm, well perfused  - Palpable DP/PT pulse, brisk cap refill  -Compartments soft and compressible        A/P: 73 y.o. male w L hip end stage arthritis s/p L JAC on 3/29/2024 with Dr. Christianson.  Doing well.     Plan:  - Weight bearing: as tolerated, anterior hip precautions  - DVT ppx: SCDs, ASA 81mg BID x3 doses to start on POD0 then eliquis 2.5mg BID x5 weeks   - Diet: Regular   - Pain: Tylenol, oxycodone 5/10, dilaudid for breakthrough  - Antibiotics: perioperative ancef 2g q8hr x3 doses then cefadroxil 500mg BID  - FEN: HLIV with good PO intake  - Bowel Regimen: Colace, senna, dulcolax  - PT/OT consulted  - Pulm: Encourage IS  - Continue home medications  - Glycemic: no issues    Dispo: likely home today w [facility]    Adeline Dolan MD PGY3  Orthopedic Resident  [facility]  Available by [employer] Message    "

## 2024-03-31 ENCOUNTER — HOME CARE VISIT (OUTPATIENT)
Dept: HOME HEALTH SERVICES | Facility: HOME HEALTH | Age: 74
End: 2024-03-31
Payer: MEDICARE

## 2024-03-31 VITALS — SYSTOLIC BLOOD PRESSURE: 118 MMHG | OXYGEN SATURATION: 99 % | HEART RATE: 77 BPM | DIASTOLIC BLOOD PRESSURE: 70 MMHG

## 2024-03-31 PROCEDURE — 169592 NO-PAY CLAIM PROCEDURE

## 2024-03-31 PROCEDURE — 0023 HH SOC

## 2024-03-31 PROCEDURE — 1090000001 HH PPS REVENUE CREDIT

## 2024-03-31 PROCEDURE — 1090000002 HH PPS REVENUE DEBIT

## 2024-03-31 PROCEDURE — G0151 HHCP-SERV OF PT,EA 15 MIN: HCPCS | Mod: HHH

## 2024-03-31 SDOH — HEALTH STABILITY: PHYSICAL HEALTH: EXERCISE TYPE: TOTAL HIP

## 2024-03-31 SDOH — HEALTH STABILITY: PHYSICAL HEALTH: EXERCISE COMMENTS: ANKLE PUMPS; GLUT SET; QUAD SET; SAQ; HEEL SLIDES

## 2024-03-31 ASSESSMENT — ACTIVITIES OF DAILY LIVING (ADL)
OASIS_M1830: 03
ENTERING_EXITING_HOME: STAND BY ASSIST
AMBULATION_DISTANCE/DURATION_TOLERATED: 50 FEET
AMBULATION ASSISTANCE ON FLAT SURFACES: 1

## 2024-03-31 ASSESSMENT — ENCOUNTER SYMPTOMS
PAIN SEVERITY GOAL: 2/10
SUBJECTIVE PAIN PROGRESSION: WAXING AND WANING
PAIN LOCATION - PAIN SEVERITY: 2/10
PERSON REPORTING PAIN: PATIENT
HIGHEST PAIN SEVERITY IN PAST 24 HOURS: 9/10
PAIN LOCATION: LEFT KNEE
LOWEST PAIN SEVERITY IN PAST 24 HOURS: 2/10
PAIN: 1

## 2024-03-31 NOTE — PROGRESS NOTES
" Fanta Christianson MD   Adult Reconstruction and Joint Replacement Surgery  Phone: [phone]     Fax: [phone]     HIP REPLACEMENT POST-OP VISIT       Name: Rafael Walter  : 1950  Date of Visit: 24    Date of Surgery: 3/29/2024  Procedure: Left direct anterior total hip replacement  Diagnosis: Left hip arthritis    Chief Complaint: Left hip replacement surgery follow-up.    History of Present Illness:    The patient is now 12 days status post left direct anterior total hip replacement surgery.    The patient is doing {home; outpatient:28762} physical therapy and is progressing well.    Denies fevers or drainage from the incision.    Patient is walking with {assistive device:57303} for assistive device.    The patient has no fevers or drainage from the incision.***    The patient is currently taking *** for pain.     The patient is currently taking *** for DVT prophylaxis.    There are no concerns.    Physical Exam:    The patient is well appearing, alert and oriented to person, place and time.    The incision is well healed.  There is no sign of wound complication.    There {IS/IS NO:79594} tenderness over the greater trochanter.    Range of motion is excellent and strength is improving.     There is no instability of the joint.    Homans sign is negative.    Neurologic and vascular examinations of the distal extremity are normal.     PROMs   24 17:05   \"HOOS, . Score\" 61.82       Imaging:    Radiographs were personally reviewed today. The implants are in good position with no evidence of complication. There have been no significant interval changes.    Impression and Plan:  73 y.o. male 12 days status post left direct anterior total hip replacement surgery.     The patient is doing well following total hip replacement surgery.  Antibiotic prophylaxis prior to dental procedures were reviewed.  Hip precautions were reviewed.  Long-term failure mechanisms were reviewed.  The patient " was asked to contact the office sooner if there are any concerns. A new physical therapy prescription was given and exercises reviewed with the patient in the office. Their questions were answered.     RTC: 6 weeks      X-rays at next visit: Postop JAC series     _____________________  Fanta Christianson MD   [facility]

## 2024-04-01 ENCOUNTER — HOME CARE VISIT (OUTPATIENT)
Dept: HOME HEALTH SERVICES | Facility: HOME HEALTH | Age: 74
End: 2024-04-01
Payer: MEDICARE

## 2024-04-01 PROCEDURE — 1090000002 HH PPS REVENUE DEBIT

## 2024-04-01 PROCEDURE — G0152 HHCP-SERV OF OT,EA 15 MIN: HCPCS | Mod: HHH

## 2024-04-01 PROCEDURE — 1090000001 HH PPS REVENUE CREDIT

## 2024-04-01 NOTE — HOME HEALTH
Patient is a 73 year old male referred for skilled PT home care after recent hospitailzation for left JAC. Patient lives with wife in one story home with two steps to enter with handrail. Prior level of function patient able to ambulate independently in home and community.     Patient ambulates in home using FWW demonstrating antalgic gait with decreased heel toe gait pattern of LLE. Completes transfers and ambulation with SBA, requires min A to lift LLE into bed secondary to weakness. Surgical dressing in tact with no signs of infeciton btu diffuse bruising on anterior aspect of left hip. Educated patient and caregiver on signs and symptoms of infection and DVT and to contact doctor with any concerns. Reviewed HEp and patient able to demonstrate wtih good return. Reviewed pain managment including medication as prescribed and ice frequently. patient would benefit from skilled Pt home care to improve LE strength, ROM, transfers, ambulation, bed mobility and progress HEP to return to prior level of function.

## 2024-04-02 ENCOUNTER — HOME CARE VISIT (OUTPATIENT)
Dept: HOME HEALTH SERVICES | Facility: HOME HEALTH | Age: 74
End: 2024-04-02
Payer: MEDICARE

## 2024-04-02 VITALS
SYSTOLIC BLOOD PRESSURE: 128 MMHG | HEART RATE: 82 BPM | TEMPERATURE: 97.4 F | DIASTOLIC BLOOD PRESSURE: 70 MMHG | OXYGEN SATURATION: 97 %

## 2024-04-02 PROCEDURE — 1090000001 HH PPS REVENUE CREDIT

## 2024-04-02 PROCEDURE — G0151 HHCP-SERV OF PT,EA 15 MIN: HCPCS | Mod: HHH

## 2024-04-02 PROCEDURE — 1090000002 HH PPS REVENUE DEBIT

## 2024-04-02 SDOH — HEALTH STABILITY: PHYSICAL HEALTH: EXERCISE COMMENTS: AP, QS, GS, HS, SAQ,  HIP ABD 0-20  2X 10 CUES FOR PACING, TECHNIQUE    INSTR IN WALKING PROGRAM

## 2024-04-02 SDOH — ECONOMIC STABILITY: HOUSING INSECURITY: HOME SAFETY: PT LIVES WITH SPOUSE, RANCH HOME. BATHROOM HAS WALK IN SHOWER, WITH GRAB BARS.

## 2024-04-02 ASSESSMENT — ENCOUNTER SYMPTOMS
HIGHEST PAIN SEVERITY IN PAST 24 HOURS: 9/10
PERSON REPORTING PAIN: PATIENT
PAIN LOCATION: LEFT HIP
PAIN LOCATION - PAIN SEVERITY: 2/10
PAIN LOCATION - PAIN SEVERITY: 4/10
PAIN: 1
PAIN LOCATION: LEFT HIP
PAIN: 1

## 2024-04-02 ASSESSMENT — ACTIVITIES OF DAILY LIVING (ADL)
AMBULATION ASSISTANCE ON FLAT SURFACES: 1
AMBULATION_DISTANCE/DURATION_TOLERATED: 200 FT
DRESSING_LB_CURRENT_FUNCTION: STAND BY ASSIST

## 2024-04-02 NOTE — PROGRESS NOTES
" Fanta Christianson MD   Adult Reconstruction and Joint Replacement Surgery  Phone: [phone]     Fax: [phone]     HIP REPLACEMENT POST-OP VISIT       Name: Rafael Walter  : 1950  Date of Visit: 24    Date of Surgery: 3/29/2024  Procedure: Left direct anterior total hip replacement  Diagnosis: Left hip arthritis    Chief Complaint: Left hip replacement surgery follow-up.    History of Present Illness:    The patient is now 14 days status post left direct anterior total hip replacement surgery.    The patient is doing home physical therapy and is progressing well.    Denies fevers or drainage from the incision.    Patient is walking with walker for assistive device.    The patient has no fevers or drainage from the incision.    The patient is currently taking tylenol for pain.     The patient is currently taking Eliquis for DVT prophylaxis.    There are no concerns.    Physical Exam:    The patient is well appearing, alert and oriented to person, place and time.    The incision is well healed.  There is no sign of wound complication.    There is no tenderness over the greater trochanter.    Range of motion is excellent and strength is improving.     There is no instability of the joint.    Homans sign is negative.    Neurologic and vascular examinations of the distal extremity are normal.     PROMs   24 17:05   \"HOOS, JR. Score\" 61.82       Imaging:    Radiographs were personally reviewed today. The implants are in good position with no evidence of complication. There have been no significant interval changes.    Impression and Plan:  73 y.o. male 14 days status post left direct anterior total hip replacement surgery.     The patient is doing well following total hip replacement surgery.  Antibiotic prophylaxis prior to dental procedures were reviewed.  Hip precautions were reviewed.  Long-term failure mechanisms were reviewed.  The patient was asked to contact the office sooner if " there are any concerns. A new physical therapy prescription was given and exercises reviewed with the patient in the office. Their questions were answered.     RTC: 6 weeks      X-rays at next visit: Postop JAC series     _____________________  Fanta Christianson MD   [facility]

## 2024-04-02 NOTE — CASE COMMUNICATION
Hi, thank you for referring Rafael to Home care PT , he is doing very very well. I just have a quick question, I know he had an anterior approach and I am familar with the precautions, however, he received a handout that was with his DC paperwork that defined posterior hip precautions.  Needless to say, he was a little confused.  I told him I would reach out to you to confirm.  Thank you!!

## 2024-04-03 ENCOUNTER — APPOINTMENT (OUTPATIENT)
Dept: PRIMARY CARE | Facility: CLINIC | Age: 74
End: 2024-04-03
Payer: MEDICARE

## 2024-04-03 PROCEDURE — 1090000002 HH PPS REVENUE DEBIT

## 2024-04-03 PROCEDURE — 1090000001 HH PPS REVENUE CREDIT

## 2024-04-04 ENCOUNTER — HOME CARE VISIT (OUTPATIENT)
Dept: HOME HEALTH SERVICES | Facility: HOME HEALTH | Age: 74
End: 2024-04-04
Payer: MEDICARE

## 2024-04-04 PROCEDURE — 1090000002 HH PPS REVENUE DEBIT

## 2024-04-04 PROCEDURE — G0158 HHC OT ASSISTANT EA 15: HCPCS | Mod: HHH

## 2024-04-04 PROCEDURE — 1090000001 HH PPS REVENUE CREDIT

## 2024-04-04 ASSESSMENT — ENCOUNTER SYMPTOMS
LOWEST PAIN SEVERITY IN PAST 24 HOURS: 0/10
SUBJECTIVE PAIN PROGRESSION: WAXING AND WANING
PAIN LOCATION: LEFT KNEE
PAIN: 1
HIGHEST PAIN SEVERITY IN PAST 24 HOURS: 3/10
PERSON REPORTING PAIN: PATIENT

## 2024-04-05 ENCOUNTER — HOME CARE VISIT (OUTPATIENT)
Dept: HOME HEALTH SERVICES | Facility: HOME HEALTH | Age: 74
End: 2024-04-05
Payer: MEDICARE

## 2024-04-05 PROCEDURE — G0157 HHC PT ASSISTANT EA 15: HCPCS | Mod: HHH

## 2024-04-05 PROCEDURE — 1090000001 HH PPS REVENUE CREDIT

## 2024-04-05 PROCEDURE — 1090000002 HH PPS REVENUE DEBIT

## 2024-04-05 ASSESSMENT — ENCOUNTER SYMPTOMS
LOWEST PAIN SEVERITY IN PAST 24 HOURS: 0/10
PAIN: 1
PAIN LOCATION: LEFT HIP
HIGHEST PAIN SEVERITY IN PAST 24 HOURS: 4/10
PERSON REPORTING PAIN: PATIENT
PAIN SEVERITY GOAL: 0/10

## 2024-04-05 NOTE — PROGRESS NOTES
Patient ID: Rafael Walter is a 73 y.o. male.    Subjective    The patient has a history of CLL dated first in 2019, most recently having completed Venetoclax + ritux in 7/2022, details below.     Today he admits mild fatigue, and chronic knee pain. No fever, weight loss, night sweating. No nausea.     Treatment Synopsis:   - 1/2016 until 2/2019, CASE 5913 (curcumin + vitamin D for CLL).   - One cycle of Chlorambucil  (started on 6/10/2019 8mg per day) and obinutuzumab (started 7/22/19 when PLT <100).  Obinutuzumab restarted on 9/4/19 with profound neutropenia .  - Venetoclax 200 mg daily (with fluconazole) with monthly rituximab  6/1/2020.  - Cycle #2 started on 7/13/20.  - Cycle #3 started on 8/10/20-therapy changed from 200 mg venetoclax dosing to 400  mg and fluconazole discontinued.  - Cycle #4 9/10/20- clon seq showed excellent response, CT chest showed resolution of bibasilar infiltrates.  - Cycle #5 started 10/8/2020.  - Cycle #6 11/5/2020- completed rituximab.  - Venetoclax continued through 7/2022 (2 full years).            Objective    BSA: There is no height or weight on file to calculate BSA.  There were no vitals taken for this visit.     Physical Exam  Constitutional:       General: He is not in acute distress.     Appearance: He is not toxic-appearing.   HENT:      Head: Normocephalic.      Nose: Nose normal.      Mouth/Throat:      Mouth: Mucous membranes are moist.   Eyes:      Extraocular Movements: Extraocular movements intact.      Pupils: Pupils are equal, round, and reactive to light.   Cardiovascular:      Rate and Rhythm: Normal rate and regular rhythm.      Heart sounds: No murmur heard.  Pulmonary:      Effort: Pulmonary effort is normal.      Breath sounds: Normal breath sounds.   Abdominal:      General: Bowel sounds are normal.      Palpations: Abdomen is soft. There is no mass.      Tenderness: There is no abdominal tenderness. There is no rebound.   Musculoskeletal:         General:  No swelling, tenderness, deformity or signs of injury.      Right lower leg: No edema.      Left lower leg: No edema.   Skin:     Coloration: Skin is not jaundiced.      Findings: No bruising, lesion or rash.   Neurological:      Mental Status: He is alert and oriented to person, place, and time.      Cranial Nerves: No cranial nerve deficit.      Motor: No weakness.      Gait: Gait normal.   Psychiatric:         Mood and Affect: Mood normal.         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  - S/p curcumin + vit D [id number] (CASE 5913).  - WBC progressed and peaked at 286k on 6/4/2019. (was 184k IN 2/2019)  - S/p chlorambucil + obinutuzumab started 6/2019; c/b mold PNA.  - By June 2020 WBC was essentially normal, but clonoseq shows persistent clones (>8000) in blood.  - S/p Venetoclax + rituximab (6/2020-11/2020). Venetoclax continued through 7/2022.  - Doing well OFF therapy at this time. CBC/ALC stable. No new worrisome symptoms reported.  - 3/21/2024: clinically doing well without worrisome signs of relapse. No new lab. Labs in 12/2023 show mildly decreased PLT level, which has actually been recovering. No clear indication to initiate new treatments. MRD remains an investigational tool. Will defer testing.      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 No recent infections.  - He is no longer on acyclovir or Bactrim.    Plan:  -Monitor for relapse.  -RTC in May with JAMIE for a virtual. Labs 1 week before.      Time spent > 25 min, with more than 50% on counseling and coordinating care.     Cancer Staging   No matching staging information was found for the patient.    Oncology History    No history exists.                 Cecilio Luciano MD PhD

## 2024-04-06 PROCEDURE — 1090000002 HH PPS REVENUE DEBIT

## 2024-04-06 PROCEDURE — 1090000001 HH PPS REVENUE CREDIT

## 2024-04-07 PROCEDURE — 1090000001 HH PPS REVENUE CREDIT

## 2024-04-07 PROCEDURE — 1090000002 HH PPS REVENUE DEBIT

## 2024-04-08 PROCEDURE — 1090000001 HH PPS REVENUE CREDIT

## 2024-04-08 PROCEDURE — 1090000002 HH PPS REVENUE DEBIT

## 2024-04-09 ENCOUNTER — TELEPHONE (OUTPATIENT)
Dept: ORTHOPEDIC SURGERY | Facility: HOSPITAL | Age: 74
End: 2024-04-09
Payer: MEDICARE

## 2024-04-09 ENCOUNTER — HOME CARE VISIT (OUTPATIENT)
Dept: HOME HEALTH SERVICES | Facility: HOME HEALTH | Age: 74
End: 2024-04-09
Payer: MEDICARE

## 2024-04-09 VITALS — HEART RATE: 80 BPM | DIASTOLIC BLOOD PRESSURE: 70 MMHG | TEMPERATURE: 98.7 F | SYSTOLIC BLOOD PRESSURE: 138 MMHG

## 2024-04-09 PROCEDURE — G0151 HHCP-SERV OF PT,EA 15 MIN: HCPCS | Mod: HHH

## 2024-04-09 PROCEDURE — 1090000001 HH PPS REVENUE CREDIT

## 2024-04-09 PROCEDURE — 1090000002 HH PPS REVENUE DEBIT

## 2024-04-09 SDOH — HEALTH STABILITY: PHYSICAL HEALTH
EXERCISE COMMENTS: DO HEP 2X DAILY VS 3X, INSTR TO ICE MORE FREQUENTLY THROUGHOUT THE DAY, DISCUSSED SUPPORTIVE OPTIONS FOR SLEEPING

## 2024-04-09 SDOH — HEALTH STABILITY: PHYSICAL HEALTH
EXERCISE COMMENTS: AP, QS, GS, HS, SAQ,HIP ABD 0-20 DEGREES  2X 10 CUES FOR PACING, TECHNIQUE, KEEPING LLE IN NEUTRAL   STANDING : WITH BL UE SUPPORT : ALTERNATE MARCHING, ALTERNATE HIP ABD 0-20 , HEEL AND TOE RAISES 2X8  FORWARD AMBULATION ALONG COUNTER   INSTR PT TO

## 2024-04-09 ASSESSMENT — ENCOUNTER SYMPTOMS
SUBJECTIVE PAIN PROGRESSION: WAXING AND WANING
HIGHEST PAIN SEVERITY IN PAST 24 HOURS: 4/10
PERSON REPORTING PAIN: PATIENT
PAIN: 1
PAIN LOCATION: LEFT HIP
PAIN LOCATION - PAIN SEVERITY: 0/10

## 2024-04-09 ASSESSMENT — ACTIVITIES OF DAILY LIVING (ADL)
AMBULATION_DISTANCE/DURATION_TOLERATED: 150 FT
AMBULATION ASSISTANCE ON FLAT SURFACES: 1

## 2024-04-09 NOTE — TELEPHONE ENCOUNTER
Spoke with patient during follow-up phone call.  Patient indicates that they are doing well and pain is under control.  Patient denies questions related to discharge instructions or homegoing medications.  Patient is aware of follow up visit with surgeon's office.  Home Care has established a first visit with patient.   Patient denies addtional questions or concerns at this time and verbalizes understanding to call RN Navigator or Surgeon's office with any new or worsening symptoms.

## 2024-04-10 ENCOUNTER — APPOINTMENT (OUTPATIENT)
Dept: ORTHOPEDIC SURGERY | Facility: HOSPITAL | Age: 74
End: 2024-04-10
Payer: MEDICARE

## 2024-04-10 PROCEDURE — 1090000001 HH PPS REVENUE CREDIT

## 2024-04-10 PROCEDURE — 1090000002 HH PPS REVENUE DEBIT

## 2024-04-11 ENCOUNTER — HOME CARE VISIT (OUTPATIENT)
Dept: HOME HEALTH SERVICES | Facility: HOME HEALTH | Age: 74
End: 2024-04-11
Payer: MEDICARE

## 2024-04-11 VITALS — TEMPERATURE: 97.8 F | HEART RATE: 78 BPM | SYSTOLIC BLOOD PRESSURE: 130 MMHG | DIASTOLIC BLOOD PRESSURE: 80 MMHG

## 2024-04-11 PROCEDURE — 1090000001 HH PPS REVENUE CREDIT

## 2024-04-11 PROCEDURE — G0151 HHCP-SERV OF PT,EA 15 MIN: HCPCS | Mod: HHH

## 2024-04-11 PROCEDURE — 1090000002 HH PPS REVENUE DEBIT

## 2024-04-11 SDOH — HEALTH STABILITY: PHYSICAL HEALTH
EXERCISE COMMENTS: STANDING : WITH BL UE SUPPORT : ALTERNATE MARCHING, ALTERNATE HIP ABD 0-20, INSTR TO KEEP LE IN NEUTRAL , HEEL AND TOE RAISES 2X12  FORWARD AMBULATION ALONG COUNTER

## 2024-04-11 ASSESSMENT — ENCOUNTER SYMPTOMS
PAIN LOCATION - PAIN SEVERITY: 0/10
HIGHEST PAIN SEVERITY IN PAST 24 HOURS: 3/10
PAIN: 1
PAIN LOCATION: LEFT HIP
PERSON REPORTING PAIN: PATIENT

## 2024-04-11 ASSESSMENT — ACTIVITIES OF DAILY LIVING (ADL)
AMBULATION_DISTANCE/DURATION_TOLERATED: 150 FT X 2
AMBULATION ASSISTANCE ON FLAT SURFACES: 1

## 2024-04-12 ENCOUNTER — OFFICE VISIT (OUTPATIENT)
Dept: ORTHOPEDIC SURGERY | Facility: CLINIC | Age: 74
End: 2024-04-12
Payer: MEDICARE

## 2024-04-12 ENCOUNTER — HOME CARE VISIT (OUTPATIENT)
Dept: HOME HEALTH SERVICES | Facility: HOME HEALTH | Age: 74
End: 2024-04-12
Payer: MEDICARE

## 2024-04-12 DIAGNOSIS — Z96.642 S/P TOTAL LEFT HIP ARTHROPLASTY: Primary | ICD-10-CM

## 2024-04-12 PROCEDURE — 1160F RVW MEDS BY RX/DR IN RCRD: CPT | Performed by: STUDENT IN AN ORGANIZED HEALTH CARE EDUCATION/TRAINING PROGRAM

## 2024-04-12 PROCEDURE — 1090000002 HH PPS REVENUE DEBIT

## 2024-04-12 PROCEDURE — 1159F MED LIST DOCD IN RCRD: CPT | Performed by: STUDENT IN AN ORGANIZED HEALTH CARE EDUCATION/TRAINING PROGRAM

## 2024-04-12 PROCEDURE — 1125F AMNT PAIN NOTED PAIN PRSNT: CPT | Performed by: STUDENT IN AN ORGANIZED HEALTH CARE EDUCATION/TRAINING PROGRAM

## 2024-04-12 PROCEDURE — 99024 POSTOP FOLLOW-UP VISIT: CPT | Performed by: STUDENT IN AN ORGANIZED HEALTH CARE EDUCATION/TRAINING PROGRAM

## 2024-04-12 PROCEDURE — 1090000001 HH PPS REVENUE CREDIT

## 2024-04-12 PROCEDURE — 1157F ADVNC CARE PLAN IN RCRD: CPT | Performed by: STUDENT IN AN ORGANIZED HEALTH CARE EDUCATION/TRAINING PROGRAM

## 2024-04-12 RX ORDER — NAPROXEN SODIUM 220 MG/1
81 TABLET, FILM COATED ORAL 2 TIMES DAILY
Qty: 48 TABLET | Refills: 0 | Status: SHIPPED | OUTPATIENT
Start: 2024-04-12 | End: 2024-05-10

## 2024-04-12 ASSESSMENT — PAIN DESCRIPTION - DESCRIPTORS: DESCRIPTORS: ACHING;SORE

## 2024-04-12 ASSESSMENT — PAIN - FUNCTIONAL ASSESSMENT: PAIN_FUNCTIONAL_ASSESSMENT: 0-10

## 2024-04-12 ASSESSMENT — PAIN SCALES - GENERAL: PAINLEVEL_OUTOF10: 4

## 2024-04-13 PROCEDURE — 1090000002 HH PPS REVENUE DEBIT

## 2024-04-13 PROCEDURE — 1090000001 HH PPS REVENUE CREDIT

## 2024-04-14 PROCEDURE — 1090000001 HH PPS REVENUE CREDIT

## 2024-04-14 PROCEDURE — 1090000002 HH PPS REVENUE DEBIT

## 2024-04-15 PROCEDURE — 1090000001 HH PPS REVENUE CREDIT

## 2024-04-15 PROCEDURE — 1090000002 HH PPS REVENUE DEBIT

## 2024-04-16 ENCOUNTER — HOME CARE VISIT (OUTPATIENT)
Dept: HOME HEALTH SERVICES | Facility: HOME HEALTH | Age: 74
End: 2024-04-16
Payer: MEDICARE

## 2024-04-16 VITALS — TEMPERATURE: 97.4 F | SYSTOLIC BLOOD PRESSURE: 134 MMHG | DIASTOLIC BLOOD PRESSURE: 80 MMHG | HEART RATE: 74 BPM

## 2024-04-16 PROCEDURE — G0151 HHCP-SERV OF PT,EA 15 MIN: HCPCS | Mod: HHH

## 2024-04-16 PROCEDURE — 1090000001 HH PPS REVENUE CREDIT

## 2024-04-16 PROCEDURE — 1090000002 HH PPS REVENUE DEBIT

## 2024-04-16 PROCEDURE — G0180 MD CERTIFICATION HHA PATIENT: HCPCS | Performed by: STUDENT IN AN ORGANIZED HEALTH CARE EDUCATION/TRAINING PROGRAM

## 2024-04-16 SDOH — HEALTH STABILITY: PHYSICAL HEALTH: EXERCISE COMMENTS: PT INDEP WITH HEP

## 2024-04-16 ASSESSMENT — ENCOUNTER SYMPTOMS
PAIN LOCATION - PAIN SEVERITY: 0/10
PAIN LOCATION: LEFT KNEE
HIGHEST PAIN SEVERITY IN PAST 24 HOURS: 6/10
PERSON REPORTING PAIN: PATIENT

## 2024-04-16 ASSESSMENT — ACTIVITIES OF DAILY LIVING (ADL)
AMBULATION_DISTANCE/DURATION_TOLERATED: 75 FT
AMBULATION ASSISTANCE ON FLAT SURFACES: 1

## 2024-04-17 PROCEDURE — 1090000001 HH PPS REVENUE CREDIT

## 2024-04-17 PROCEDURE — 1090000002 HH PPS REVENUE DEBIT

## 2024-04-18 ENCOUNTER — HOME CARE VISIT (OUTPATIENT)
Dept: HOME HEALTH SERVICES | Facility: HOME HEALTH | Age: 74
End: 2024-04-18
Payer: MEDICARE

## 2024-04-18 VITALS — HEART RATE: 66 BPM | SYSTOLIC BLOOD PRESSURE: 110 MMHG | DIASTOLIC BLOOD PRESSURE: 70 MMHG | TEMPERATURE: 97.8 F

## 2024-04-18 DIAGNOSIS — M25.552 LEFT HIP PAIN: ICD-10-CM

## 2024-04-18 PROCEDURE — 1090000002 HH PPS REVENUE DEBIT

## 2024-04-18 PROCEDURE — 1090000001 HH PPS REVENUE CREDIT

## 2024-04-18 PROCEDURE — G0151 HHCP-SERV OF PT,EA 15 MIN: HCPCS | Mod: HHH

## 2024-04-18 RX ORDER — NAPROXEN 500 MG/1
500 TABLET ORAL 2 TIMES DAILY PRN
Qty: 60 TABLET | Refills: 0 | Status: SHIPPED | OUTPATIENT
Start: 2024-04-18 | End: 2024-07-17

## 2024-04-18 SDOH — HEALTH STABILITY: PHYSICAL HEALTH
EXERCISE COMMENTS: CONSOLIDATED HEP: STANDING WITH BL UE: HIP FLEXION, HEEL AND TOE RAISES, HIP ABD 0-20, SIDE STEPPING, SEATED LAQ, SUPINE: HS, HIP ABD 0-20 AND SAQ, INSTR TO ALTERNATE SUPINE HEP WITH STANDING AND SEATED HEP , INSTR TO CONT WITH WALKING PROGRAM DAILY,

## 2024-04-18 SDOH — HEALTH STABILITY: PHYSICAL HEALTH: EXERCISE COMMENTS: INSTR TO USE CANE OUTDOORS AND IN COMMUNITY

## 2024-04-18 ASSESSMENT — ACTIVITIES OF DAILY LIVING (ADL)
AMBULATION ASSISTANCE ON UNEVEN SURFACES: 1
OASIS_M1830: 01
HOME_HEALTH_OASIS: 00
AMBULATION_DISTANCE/DURATION_TOLERATED: 150
AMBULATION ASSISTANCE ON FLAT SURFACES: 1

## 2024-04-18 ASSESSMENT — ENCOUNTER SYMPTOMS
PAIN LOCATION - PAIN SEVERITY: 0/10
OCCASIONAL FEELINGS OF UNSTEADINESS: 0
HIGHEST PAIN SEVERITY IN PAST 24 HOURS: 2/10
PAIN LOCATION: LEFT HIP
DEPRESSION: 0
PAIN: 1
LOSS OF SENSATION IN FEET: 0

## 2024-04-24 ENCOUNTER — APPOINTMENT (OUTPATIENT)
Dept: PHYSICAL THERAPY | Facility: CLINIC | Age: 74
End: 2024-04-24
Payer: MEDICARE

## 2024-04-26 ENCOUNTER — APPOINTMENT (OUTPATIENT)
Dept: PHYSICAL THERAPY | Facility: CLINIC | Age: 74
End: 2024-04-26
Payer: MEDICARE

## 2024-04-30 NOTE — PROGRESS NOTES
Fanta Christianson MD   Adult Reconstruction and Joint Replacement Surgery  Phone: [phone]     Fax: [phone]     HIP REPLACEMENT POST-OP VISIT     Name: Rafael Walter  : 1950  Date of Visit: 2024    Procedure: Left direct anterior total hip replacement  Date: 3/29/2024    Chief Complaint: Left Hip Replacement Surgery Follow Up    History of Present Illness:    The patient is now 7 weeks 0 days status post left direct anterior total hip replacement surgery.    The patient has no pain.    Currently taking nothing for pain.     The patient has low stiffness.     The patient has no limp. He reports limping due to end-stage arthritis of right hip.     Patient is walking with nothing for assistive device.    The patient goes up and down stairs normally.    Patient can walk 6 blocks.    The patient puts shoes and socks on with ease.     The patient is doing outpatient and home exercise program physical therapy.    The patient does not have trochanteric pain.    No fevers or drainage from the incision.    There are no concerns.    Physical Exam:    The patient is well appearing, alert and oriented to person, place and time.    The incision is well healed.  There is no sign of wound complication.    There is no tenderness over the greater trochanter.    Range of motion is: full extension to 100 degrees of flexion.    The hip internally rotates to 15 degrees and externally rotates to 40 degrees.    Abduction is 40 degrees and adduction is 15 degrees.    There is no instability of the joint.    Homans sign is negative.    Neurologic, and vascular examinations are normal.    PROMs   Hoos Jr-Hip Disability And Osteoarthritis Outcome Score For Joint Replacement    3/20/2024  5:05 PM EDT - Filed by Patient   Instructions    What amount of hip pain have you experienced the last week during the following activities?   Going up or down stairs Moderate   Walking on an uneven surface Mild   The following  questions concern your physical function. By this we mean your ability to move around and to look after yourself. For each of the following activities please indicate the degree of difficulty you have experienced in the last week due to your hip.   Rising from sitting Moderate   Bending to floor/ an object Severe   Lying in bed (turning over, maintaining hip position) Mild   Sitting None   Hoos Jr Scoring (range: 0 - 100) 61.82     [facility] 12 Item Health Survey (Vr-12)    3/18/2024  9:46 PM EDT - Filed by Patient   In general, would you say your health is: Very Good   The following questions are about activities you might do during a typical day. Does your health now limit you in these activities?  If so, how much?   Moderate activities, such as moving a table, pushing a vacuum , bowling, or playing golf? 3 No, Not Limited At All   Climbing several flights of stairs? Yes, Limited A Little   During the past 4 weeks, have you had any of the following problems with your work or other regular daily activities as a result of your physical health?   Accomplished less than you would like. Yes, A Little Of The Time   Were limited in the kind of work or other activities. Yes, Some Of The Time   During the past 4 weeks, have you had any of the following problems with your work or other regular daily activities as a result of any emotional problems (such as feeling depressed or anxious)?   Accomplished less than you would like Yes, A Little Of The Time   Didn't do work or other activities as carefully as usual No, None Of The Time   During the past 4 weeks, how much did pain interfere with your normal work (including both work outside the home and house work)? A Little Bit   How much of the time during the past 4 weeks:   Have you felt calm and peaceful? Most Of The Time   Did you have a lot of energy? A Good Bit Of The Time   Have you felt downhearted and blue? None Of The Time   During the past 4  weeks, how much of the time has your physical health or emotional problems interfered with your social activities (like visiting with friends, relatives, etc.)? None Of The Time   Compared to one year ago, how would you rate your physical health in general now? About The Same   Compared to one year ago, how would you rate your emotional problems (such as feeling anxious, depressed or irritable) now? About The Same   Ort Vr-12 Question Pcs (range: 0 - 100) 43.06   Ort Vr-12 Question McS (range: 0 - 100) 58.81          Imaging:    X-rays were personally reviewed today and show implants in good position with no evidence of complication.    Impression and Plan:  73 y.o. male 7 weeks 0 days from Left direct anterior total hip replacement.    The patient is doing well following total hip replacement surgery.  Antibiotic prophylaxis prior to dental procedures were reviewed.  Hip precautions were reviewed.  Long-term failure mechanisms were reviewed.  The patient was asked to contact the office sooner if there are any concerns. New physical therapy prescription was given and exercises reviewed with the patient in the office. Their questions were answered.     RTC: 1 year     X-rays at next visit: Postop JAC series     _____________________  Fanta Christianson MD  [facility]

## 2024-05-01 ENCOUNTER — EVALUATION (OUTPATIENT)
Dept: PHYSICAL THERAPY | Facility: CLINIC | Age: 74
End: 2024-05-01
Payer: MEDICARE

## 2024-05-01 DIAGNOSIS — Z47.1 AFTERCARE FOLLOWING LEFT HIP JOINT REPLACEMENT SURGERY: ICD-10-CM

## 2024-05-01 DIAGNOSIS — M25.552 LEFT HIP PAIN: Primary | ICD-10-CM

## 2024-05-01 DIAGNOSIS — M16.12 UNILATERAL PRIMARY OSTEOARTHRITIS, LEFT HIP: ICD-10-CM

## 2024-05-01 DIAGNOSIS — Z96.642 AFTERCARE FOLLOWING LEFT HIP JOINT REPLACEMENT SURGERY: ICD-10-CM

## 2024-05-01 DIAGNOSIS — M25.652 STIFFNESS OF LEFT HIP JOINT: ICD-10-CM

## 2024-05-01 PROBLEM — R52 PAIN: Status: ACTIVE | Noted: 2024-05-01

## 2024-05-01 PROCEDURE — 97110 THERAPEUTIC EXERCISES: CPT | Mod: GP

## 2024-05-01 PROCEDURE — 97161 PT EVAL LOW COMPLEX 20 MIN: CPT | Mod: GP

## 2024-05-01 ASSESSMENT — PAIN SCALES - GENERAL: PAINLEVEL_OUTOF10: 0 - NO PAIN

## 2024-05-01 ASSESSMENT — PAIN - FUNCTIONAL ASSESSMENT: PAIN_FUNCTIONAL_ASSESSMENT: 0-10

## 2024-05-01 NOTE — PROGRESS NOTES
Physical Therapy    Physical Therapy Evaluation and Treatment      Patient Name: Rafael Walter  MRN: [id number]  Today's Date: 5/1/2024      Visit Number: 1  Approved Visits: BEVERLY Choi required: No  Medicare Certification Date Range: 05/01/2024 to 07/30/2024    Assessment:  PT Assessment  Rehab Prognosis: Good  Evaluation/Treatment Tolerance: Patient tolerated treatment well   Patient is a 73 year old male who presents today 4 weeks and 5 days s/p left anterior JAC. He presents today with decreased global hip strength and limitation in his hip mobility. His incision appears intact and there appears to be no sign of infection. These impairments have limited the patients ability to participate in weekly (2-3 days) strength training, swimming, and walking around the track. These signs and symptoms are consistent with risk of declining strength and mobility and limitations in functional activities. Patient requires skilled PT intervention to address these functional limitations and decreased strength and mobility. Patient is motivated to reduce pain levels to allow him to complete his functional activities and return him to weekly strength training, swimming, and walking around the track.    Plan:  OP PT Plan  Treatment/Interventions: Cryotherapy, Dry needling, Education/ Instruction, Gait training, Hot pack, Manual therapy, Neuromuscular re-education, Self care/ home management, Therapeutic activities, Therapeutic exercises, Vasopneumatic device  PT Plan: Skilled PT  PT Frequency: 1 time per week  Duration: 8-10 weeks  Onset Date: 03/29/24  Certification Period Start Date: 05/01/24  Certification Period End Date: 07/30/24    Current Problem:   1. Left hip pain        2. Stiffness of left hip joint        3. Aftercare following left hip joint replacement surgery        4. Unilateral primary osteoarthritis, left hip  Referral to Physical Therapy          Subjective    General:  Chief complaint: Patient presents 4 weeks and  5 days s/p left anterior JAC. He reports that the recovery is going well and removed use of cane around a week ago. He notes that he has been able to now sleep on his left side, he got back to driving, and slight walking on the treadmill(~4min). He notes that he has been limited in strength training, swimming, and prolonged walking. He hopes to return to running and golfing after his right JAC in . He did note that he has to be careful when getting into and out of his car. He reports no numbness/tingling or swelling at this date.  PMHx: Cancer, hypertension, hyperlipidemia, panic disorder, gout  : verified with patient   PLOF: independent without restrictions  Medications: see chart for full medication list  Patient goals for PT: He hopes to return to the gym for strength training, swimming, and walking around the track 2-3 times per week.  Medical Screening: Patient denies bowel/bladder changes, pulsatile mass in abdomen, recent infection/illness, calf pain, headaches, chest pain, SOB, redness/warmth/swelling in LE  General  Reason for Referral: Aftercare following left hip replacement  Referred By: Dr. Christianson  Precautions:  Precautions  Precautions Comment: WBAT; moderate fall risk  Pain:  Pain Assessment  Pain Assessment: 0-10  Pain Score: 0 - No pain  Home Living:  Ranch style with one set of stairs to basement     Objective   Initial Evaluation Objective 2024:  Functional Performance:  STS transfer: Needed use of bilateral UE support    5x sit to stand: 15.95 seconds -- needed use of UE support and appeared fatigued after 5 reps  SLB: Slight pelvic drop bilaterally  Stairs negotiation: Slight decreased L LE drive and eccentric control L LE; slight UE support with ascending and descending   Gait Analysis: Discharged cane last week; slight pelvic drop on L  Hip ROM:   Flexion: R: ~115 L: ~90   Extension: Did not test this date   IR(90*): R: ~30 L: ~25   ER(90*): R: ~35 L: ~20  MMT:   Hip  Flexion: R: 3+/5 L: 3+/5   Prone Hip Extension: R: 3+/5 L: 3+/5   Hip Abduction: R: 4/5 L: 3+/5  Observation: No signs or symptoms of infection or swelling; incision intact  Palpation: No noted tenderness    Outcome Measures:  Other Measures  Lower Extremity Funtional Score (LEFS): 37     Treatments:   Glute bridge w/ RB 3-5 second holds  Supine knee to chest  SLB 10 seconds holds  Elevated STS  Edu general activity guidelines avoiding extreme ranges of motion    EDUCATION:  Outpatient Education  Individual(s) Educated: Patient  Education Provided: Home Exercise Program, Post-Op Precautions, Signs/Symptoms of Infection  Risk and Benefits Discussed with Patient/Caregiver/Other: yes  Patient/Caregiver Demonstrated Understanding: yes  Plan of Care Discussed and Agreed Upon: yes  Patient Response to Education: Patient/Caregiver Verbalized Understanding of Information  Education Comment: Review HEP and post-op precautions    Goals:  Patient will demonstrate proper performance and understanding of his HEP of active strengthening and mobility exercises to demonstrate his motivation to return to his prior level of no pain.   Patient will increase his overall strength from 3+/5 to 5/5 to improve his overall strength and reduce his pain levels.   Patient will demonstrate the ability to complete 15 continuous STS without use of UE support to improve his endurance and overall functional strength.  Patient will demonstrate the ability to complete 5xSTS <12.6seconds to meet age based norm value to improve his endurance and overall functional ability.  Patient will report the ability to complete walking around the track for >30 minutes without reproduction of pain or fatigue to improve endurance and overall functional strength.  Patient will report the ability to complete a swimming workout without reproduction of pain to improve endurance and strength, and return patient to prior level of functioning.  Patient will report the  ability to complete a strength training workout without reproduction of pain to improve strength and return patient to prior level of functioning.

## 2024-05-03 ENCOUNTER — TREATMENT (OUTPATIENT)
Dept: PHYSICAL THERAPY | Facility: CLINIC | Age: 74
End: 2024-05-03
Payer: MEDICARE

## 2024-05-03 DIAGNOSIS — M25.552 LEFT HIP PAIN: Primary | ICD-10-CM

## 2024-05-03 DIAGNOSIS — M25.652 STIFFNESS OF LEFT HIP JOINT: ICD-10-CM

## 2024-05-03 DIAGNOSIS — Z47.1 AFTERCARE FOLLOWING LEFT HIP JOINT REPLACEMENT SURGERY: ICD-10-CM

## 2024-05-03 DIAGNOSIS — Z96.642 AFTERCARE FOLLOWING LEFT HIP JOINT REPLACEMENT SURGERY: ICD-10-CM

## 2024-05-03 DIAGNOSIS — N52.9 MALE ERECTILE DISORDER OF ORGANIC ORIGIN: ICD-10-CM

## 2024-05-03 PROCEDURE — 97110 THERAPEUTIC EXERCISES: CPT | Mod: GP

## 2024-05-03 RX ORDER — SILDENAFIL 50 MG/1
50 TABLET, FILM COATED ORAL DAILY PRN
Qty: 10 TABLET | Refills: 1 | Status: SHIPPED | OUTPATIENT
Start: 2024-05-03 | End: 2024-05-03 | Stop reason: SDUPTHER

## 2024-05-03 RX ORDER — SILDENAFIL 50 MG/1
50 TABLET, FILM COATED ORAL DAILY PRN
Qty: 10 TABLET | Refills: 1 | Status: SHIPPED | OUTPATIENT
Start: 2024-05-03

## 2024-05-03 ASSESSMENT — PAIN - FUNCTIONAL ASSESSMENT: PAIN_FUNCTIONAL_ASSESSMENT: 0-10

## 2024-05-03 ASSESSMENT — PAIN SCALES - GENERAL: PAINLEVEL_OUTOF10: 0 - NO PAIN

## 2024-05-03 NOTE — PROGRESS NOTES
Physical Therapy    Physical Therapy Evaluation and Treatment      Patient Name: Rafael Walter  MRN: [id number]  Today's Date: 5/3/2024  Time Calculation  Start Time: 1009  Stop Time: 1050  Time Calculation (min): 41 min   Visit Number: 1  Approved Visits: BEVERLY Choi required: No  Medicare Certification Date Range: 05/01/2024 to 07/30/2024    Assessment:   Patient demonstrates good progression with his POC  5 weeks s/p left anterior JAC. To improve his mobility, 5-min warm up on the stationary bike was introduced and hook lying marching and he demonstrated improved mobility. Patient demonstrated good progression with his HEP so to challenge his stability we introduced hook lying glute bridges on the BOSU. To improve his strength, adduction isometrics, side lying clamshells, LAQ, and standing hip extension were introduced. He required cueing to prevent pelvic rotation with the clamshells and hip extensions which he was able to correct. To continue to improve his endurance and strength, we implemented a 4lb. weight to his STS to challenge him. He demonstrated moderate fatigue at the end of each set. SLB on the airex pad and posterior/lateral taps were introduced to improve his stability. He demonstrated challenge with the SLB on the airexpad. Overall, he continues to show improvement in his strength, endurance, and balance.    Care was provided to this patient by a student who was under direct supervision and guidance of the co-signed licensed physical therapist.     Plan:    Continue functional strengthening activities and improve balance and endurance.    Current Problem:   1. Left hip pain        2. Stiffness of left hip joint        3. Aftercare following left hip joint replacement surgery            Subjective    General:  Patient presents 5 weeks s/p left anterior JAC. Patient reports that his HEP is going well and feels that he his progressing well. He is excited to return to his prior level of  functioning.  Precautions:  Precautions  Precautions Comment: Anterior JAC precautions, low fall risk  Pain:  Pain Assessment  Pain Assessment: 0-10  Pain Score: 0 - No pain      Objective   Initial Evaluation Objective 5/1/2024:  Functional Performance:  STS transfer: Needed use of bilateral UE support    5x sit to stand: 15.95 seconds -- needed use of UE support and appeared fatigued after 5 reps  SLB: Slight pelvic drop bilaterally  Stairs negotiation: Slight decreased L LE drive and eccentric control L LE; slight UE support with ascending and descending   Gait Analysis: Discharged cane last week; slight pelvic drop on L  Hip ROM:   Flexion: R: ~115 L: ~90   Extension: Did not test this date   IR(90*): R: ~30 L: ~25   ER(90*): R: ~35 L: ~20  MMT:   Hip Flexion: R: 3+/5 L: 3+/5   Prone Hip Extension: R: 3+/5 L: 3+/5   Hip Abduction: R: 4/5 L: 3+/5  Observation: No signs or symptoms of infection or swelling; incision intact  Palpation: No noted tenderness    Outcome Measures:    Not tested at this date.    Treatments:   Therapeutic Exercise:  5-min warmup on stationary bike   Hook lying adduction holds 8x10 second holds  Hook lying marches 2x10  Hook lying glute bridge BOSU ball 3x10  Sidelying clamshells w/ RB 2x10 -- cueing to prevent pelvic rotation  Sitting LAQ w/ 2 lb. ankle weight 2x10   Standing hip extension w/ 2 lb. ankle weight -- cueing to prevent pelvic drop  SLS posterior/lateral taps 2x10 -- cueing to minimize UE support and rounded shoulders   SLB on airex pad 5x10 second holds  Elevated STS w/ 4lb.     EDUCATION:   Added side lying clamshells and SLS posterior/lateral taps HEP.    Goals:  Patient will demonstrate proper performance and understanding of his HEP of active strengthening and mobility exercises to demonstrate his motivation to return to his prior level of no pain.   Patient will increase his overall strength from 3+/5 to 5/5 to improve his overall strength and reduce his pain levels.    Patient will demonstrate the ability to complete 15 continuous STS without use of UE support to improve his endurance and overall functional strength.  Patient will demonstrate the ability to complete 5xSTS <12.6seconds to meet age based norm value to improve his endurance and overall functional ability.  Patient will report the ability to complete walking around the track for >30 minutes without reproduction of pain or fatigue to improve endurance and overall functional strength.  Patient will report the ability to complete a swimming workout without reproduction of pain to improve endurance and strength, and return patient to prior level of functioning.  Patient will report the ability to complete a strength training workout without reproduction of pain to improve strength and return patient to prior level of functioning.

## 2024-05-07 ENCOUNTER — APPOINTMENT (OUTPATIENT)
Dept: PHYSICAL THERAPY | Facility: CLINIC | Age: 74
End: 2024-05-07
Payer: MEDICARE

## 2024-05-08 ENCOUNTER — TREATMENT (OUTPATIENT)
Dept: PHYSICAL THERAPY | Facility: CLINIC | Age: 74
End: 2024-05-08
Payer: MEDICARE

## 2024-05-08 DIAGNOSIS — M25.552 LEFT HIP PAIN: Primary | ICD-10-CM

## 2024-05-08 DIAGNOSIS — Z47.1 AFTERCARE FOLLOWING LEFT HIP JOINT REPLACEMENT SURGERY: ICD-10-CM

## 2024-05-08 DIAGNOSIS — Z96.642 AFTERCARE FOLLOWING LEFT HIP JOINT REPLACEMENT SURGERY: ICD-10-CM

## 2024-05-08 DIAGNOSIS — M25.652 STIFFNESS OF LEFT HIP JOINT: ICD-10-CM

## 2024-05-08 PROCEDURE — 97110 THERAPEUTIC EXERCISES: CPT | Mod: GP

## 2024-05-08 ASSESSMENT — PAIN SCALES - GENERAL: PAINLEVEL_OUTOF10: 0 - NO PAIN

## 2024-05-08 ASSESSMENT — PAIN - FUNCTIONAL ASSESSMENT: PAIN_FUNCTIONAL_ASSESSMENT: 0-10

## 2024-05-08 NOTE — PROGRESS NOTES
Physical Therapy    Physical Therapy Evaluation and Treatment      Patient Name: Rafael Walter  MRN: [id number]  Today's Date: 5/8/2024      Visit Number: 3  Approved Visits: BEVERLY Choi required: No  Medicare Certification Date Range: 05/01/2024 to 07/30/2024    Assessment:   Patient demonstrates good progression with his POC 5 weeks and 5 days s/p left anterior JAC. To improve his mobility, the 5-min warm up on the stationary bike was continued and hip 3-way with the RB was introduced. Patient continues to demonstrate good progression with his HEP so to challenge his stability we continued the hook lying glute bridges on the BOSU. Adduction isometrics and standing hip extension were continued to improve his strength. We introduced standing abduction, leg press, and lateral and forward step-ups to progress strength training and challenge his balance. He required cueing to prevent use of his UE support to improve his balance and stability. He demonstrated moderate fatigue at the end of the step-ups and leg press showcasing appropriate progression of his POC. Overall, he continues to show improvement in his strength, endurance, and balance.    Care was provided to this patient by a student who was under direct supervision and guidance of the co-signed licensed physical therapist.     Plan:    Continue functional strengthening activities and improve balance and endurance.    Current Problem:   1. Left hip pain        2. Stiffness of left hip joint        3. Aftercare following left hip joint replacement surgery            Subjective    General:  Patient presents 5 weeks and 5 days s/p left anterior JAC. Patient continues to report that he is progressing well and reports that he was able to return to the gym to complete an upper body workout.   Precautions:  Precautions  Precautions Comment: Anterior JAC precautions, low fall risk  Pain:  Pain Assessment  Pain Assessment: 0-10  Pain Score: 0 - No pain    Objective    Initial Evaluation Objective 5/1/2024:  Functional Performance:  STS transfer: Needed use of bilateral UE support    5x sit to stand: 15.95 seconds -- needed use of UE support and appeared fatigued after 5 reps  SLB: Slight pelvic drop bilaterally  Stairs negotiation: Slight decreased L LE drive and eccentric control L LE; slight UE support with ascending and descending   Gait Analysis: Discharged cane last week; slight pelvic drop on L  Hip ROM:   Flexion: R: ~115 L: ~90   Extension: Did not test this date   IR(90*): R: ~30 L: ~25   ER(90*): R: ~35 L: ~20  MMT:   Hip Flexion: R: 3+/5 L: 3+/5   Prone Hip Extension: R: 3+/5 L: 3+/5   Hip Abduction: R: 4/5 L: 3+/5  Observation: No signs or symptoms of infection or swelling; incision intact  Palpation: No noted tenderness    Outcome Measures:    Not tested at this date.    Treatments:   Therapeutic Exercise:  5-min warmup on stationary bike   Hip 3-way w/ RB  Hook lying adduction holds 8x10 second holds  Hook lying glute bridge BOSU ball 3x10  Standing extension w/ 3 lb. ankle weight 2x10  Standing abduction w/ 3 lb. ankle weight 2x10  Leg press w/ 1 yellow resistance 2x8  FWD Step ups 2x20  Lateral step ups 2x20    EDUCATION:   Continued to educate him surrounding his current HEP and added leg press and fwd step-ups to his HEP.    Goals:  Patient will demonstrate proper performance and understanding of his HEP of active strengthening and mobility exercises to demonstrate his motivation to return to his prior level of no pain.   Patient will increase his overall strength from 3+/5 to 5/5 to improve his overall strength and reduce his pain levels.   Patient will demonstrate the ability to complete 15 continuous STS without use of UE support to improve his endurance and overall functional strength.  Patient will demonstrate the ability to complete 5xSTS <12.6seconds to meet age based norm value to improve his endurance and overall functional ability.  Patient will  report the ability to complete walking around the track for >30 minutes without reproduction of pain or fatigue to improve endurance and overall functional strength.  Patient will report the ability to complete a swimming workout without reproduction of pain to improve endurance and strength, and return patient to prior level of functioning.  Patient will report the ability to complete a strength training workout without reproduction of pain to improve strength and return patient to prior level of functioning.

## 2024-05-09 ENCOUNTER — APPOINTMENT (OUTPATIENT)
Dept: PHYSICAL THERAPY | Facility: CLINIC | Age: 74
End: 2024-05-09
Payer: MEDICARE

## 2024-05-10 ENCOUNTER — APPOINTMENT (OUTPATIENT)
Dept: PHYSICAL THERAPY | Facility: CLINIC | Age: 74
End: 2024-05-10
Payer: MEDICARE

## 2024-05-10 ENCOUNTER — OFFICE VISIT (OUTPATIENT)
Dept: HEMATOLOGY/ONCOLOGY | Facility: HOSPITAL | Age: 74
End: 2024-05-10
Payer: MEDICARE

## 2024-05-10 ENCOUNTER — LAB (OUTPATIENT)
Dept: LAB | Facility: HOSPITAL | Age: 74
End: 2024-05-10
Payer: MEDICARE

## 2024-05-10 VITALS
HEART RATE: 71 BPM | BODY MASS INDEX: 23.16 KG/M2 | DIASTOLIC BLOOD PRESSURE: 73 MMHG | RESPIRATION RATE: 16 BRPM | WEIGHT: 185.3 LBS | SYSTOLIC BLOOD PRESSURE: 139 MMHG | TEMPERATURE: 96.8 F | OXYGEN SATURATION: 100 %

## 2024-05-10 DIAGNOSIS — C91.10 CHRONIC LYMPHOCYTIC LEUKEMIA (MULTI): ICD-10-CM

## 2024-05-10 LAB
ALBUMIN SERPL BCP-MCNC: 4.5 G/DL (ref 3.4–5)
ALP SERPL-CCNC: 98 U/L (ref 33–136)
ALT SERPL W P-5'-P-CCNC: 11 U/L (ref 10–52)
ANION GAP SERPL CALC-SCNC: 12 MMOL/L (ref 10–20)
AST SERPL W P-5'-P-CCNC: 15 U/L (ref 9–39)
BASOPHILS # BLD AUTO: 0.02 X10*3/UL (ref 0–0.1)
BASOPHILS NFR BLD AUTO: 0.5 %
BILIRUB SERPL-MCNC: 0.7 MG/DL (ref 0–1.2)
BUN SERPL-MCNC: 14 MG/DL (ref 6–23)
CALCIUM SERPL-MCNC: 9.3 MG/DL (ref 8.6–10.3)
CHLORIDE SERPL-SCNC: 104 MMOL/L (ref 98–107)
CO2 SERPL-SCNC: 29 MMOL/L (ref 21–32)
CREAT SERPL-MCNC: 0.82 MG/DL (ref 0.5–1.3)
EGFRCR SERPLBLD CKD-EPI 2021: >90 ML/MIN/1.73M*2
EOSINOPHIL # BLD AUTO: 0.05 X10*3/UL (ref 0–0.4)
EOSINOPHIL NFR BLD AUTO: 1.3 %
ERYTHROCYTE [DISTWIDTH] IN BLOOD BY AUTOMATED COUNT: 14.6 % (ref 11.5–14.5)
GLUCOSE SERPL-MCNC: 85 MG/DL (ref 74–99)
HCT VFR BLD AUTO: 44.4 % (ref 41–52)
HGB BLD-MCNC: 13.9 G/DL (ref 13.5–17.5)
IGA SERPL-MCNC: 28 MG/DL (ref 70–400)
IGG SERPL-MCNC: 1040 MG/DL (ref 700–1600)
IGM SERPL-MCNC: <5 MG/DL (ref 40–230)
IMM GRANULOCYTES # BLD AUTO: 0.01 X10*3/UL (ref 0–0.5)
IMM GRANULOCYTES NFR BLD AUTO: 0.3 % (ref 0–0.9)
LYMPHOCYTES # BLD AUTO: 1.13 X10*3/UL (ref 0.8–3)
LYMPHOCYTES NFR BLD AUTO: 30.2 %
MCH RBC QN AUTO: 25.9 PG (ref 26–34)
MCHC RBC AUTO-ENTMCNC: 31.3 G/DL (ref 32–36)
MCV RBC AUTO: 83 FL (ref 80–100)
MONOCYTES # BLD AUTO: 0.3 X10*3/UL (ref 0.05–0.8)
MONOCYTES NFR BLD AUTO: 8 %
NEUTROPHILS # BLD AUTO: 2.23 X10*3/UL (ref 1.6–5.5)
NEUTROPHILS NFR BLD AUTO: 59.7 %
NRBC BLD-RTO: 0 /100 WBCS (ref 0–0)
PLATELET # BLD AUTO: 152 X10*3/UL (ref 150–450)
POTASSIUM SERPL-SCNC: 4.2 MMOL/L (ref 3.5–5.3)
PROT SERPL-MCNC: 7.2 G/DL (ref 6.4–8.2)
RBC # BLD AUTO: 5.37 X10*6/UL (ref 4.5–5.9)
SODIUM SERPL-SCNC: 141 MMOL/L (ref 136–145)
WBC # BLD AUTO: 3.7 X10*3/UL (ref 4.4–11.3)

## 2024-05-10 PROCEDURE — 1160F RVW MEDS BY RX/DR IN RCRD: CPT | Performed by: INTERNAL MEDICINE

## 2024-05-10 PROCEDURE — 1159F MED LIST DOCD IN RCRD: CPT | Performed by: INTERNAL MEDICINE

## 2024-05-10 PROCEDURE — 85025 COMPLETE CBC W/AUTO DIFF WBC: CPT

## 2024-05-10 PROCEDURE — 99213 OFFICE O/P EST LOW 20 MIN: CPT | Performed by: INTERNAL MEDICINE

## 2024-05-10 PROCEDURE — 82784 ASSAY IGA/IGD/IGG/IGM EACH: CPT | Performed by: INTERNAL MEDICINE

## 2024-05-10 PROCEDURE — 36415 COLL VENOUS BLD VENIPUNCTURE: CPT

## 2024-05-10 PROCEDURE — 80053 COMPREHEN METABOLIC PANEL: CPT

## 2024-05-10 PROCEDURE — 1126F AMNT PAIN NOTED NONE PRSNT: CPT | Performed by: INTERNAL MEDICINE

## 2024-05-10 PROCEDURE — 1157F ADVNC CARE PLAN IN RCRD: CPT | Performed by: INTERNAL MEDICINE

## 2024-05-10 PROCEDURE — 3075F SYST BP GE 130 - 139MM HG: CPT | Performed by: INTERNAL MEDICINE

## 2024-05-10 PROCEDURE — 3078F DIAST BP <80 MM HG: CPT | Performed by: INTERNAL MEDICINE

## 2024-05-10 ASSESSMENT — PAIN SCALES - GENERAL: PAINLEVEL_OUTOF10: 0-NO PAIN

## 2024-05-14 ENCOUNTER — APPOINTMENT (OUTPATIENT)
Dept: PHYSICAL THERAPY | Facility: CLINIC | Age: 74
End: 2024-05-14
Payer: MEDICARE

## 2024-05-15 ENCOUNTER — APPOINTMENT (OUTPATIENT)
Dept: PHYSICAL THERAPY | Facility: CLINIC | Age: 74
End: 2024-05-15
Payer: MEDICARE

## 2024-05-16 ENCOUNTER — APPOINTMENT (OUTPATIENT)
Dept: PHYSICAL THERAPY | Facility: CLINIC | Age: 74
End: 2024-05-16
Payer: MEDICARE

## 2024-05-17 ENCOUNTER — TREATMENT (OUTPATIENT)
Dept: PHYSICAL THERAPY | Facility: CLINIC | Age: 74
End: 2024-05-17
Payer: MEDICARE

## 2024-05-17 ENCOUNTER — OFFICE VISIT (OUTPATIENT)
Dept: ORTHOPEDIC SURGERY | Facility: CLINIC | Age: 74
End: 2024-05-17
Payer: MEDICARE

## 2024-05-17 ENCOUNTER — HOSPITAL ENCOUNTER (OUTPATIENT)
Dept: RADIOLOGY | Facility: CLINIC | Age: 74
Discharge: HOME | End: 2024-05-17
Payer: MEDICARE

## 2024-05-17 DIAGNOSIS — M25.552 LEFT HIP PAIN: Primary | ICD-10-CM

## 2024-05-17 DIAGNOSIS — Z96.642 AFTERCARE FOLLOWING LEFT HIP JOINT REPLACEMENT SURGERY: ICD-10-CM

## 2024-05-17 DIAGNOSIS — M25.652 STIFFNESS OF LEFT HIP JOINT: ICD-10-CM

## 2024-05-17 DIAGNOSIS — Z47.1 AFTERCARE FOLLOWING LEFT HIP JOINT REPLACEMENT SURGERY: ICD-10-CM

## 2024-05-17 DIAGNOSIS — Z96.642 S/P TOTAL LEFT HIP ARTHROPLASTY: Primary | ICD-10-CM

## 2024-05-17 DIAGNOSIS — M16.11 UNILATERAL PRIMARY OSTEOARTHRITIS, RIGHT HIP: ICD-10-CM

## 2024-05-17 PROCEDURE — 1157F ADVNC CARE PLAN IN RCRD: CPT | Performed by: STUDENT IN AN ORGANIZED HEALTH CARE EDUCATION/TRAINING PROGRAM

## 2024-05-17 PROCEDURE — 97110 THERAPEUTIC EXERCISES: CPT | Mod: GP

## 2024-05-17 PROCEDURE — 1159F MED LIST DOCD IN RCRD: CPT | Performed by: STUDENT IN AN ORGANIZED HEALTH CARE EDUCATION/TRAINING PROGRAM

## 2024-05-17 PROCEDURE — 73502 X-RAY EXAM HIP UNI 2-3 VIEWS: CPT | Mod: LEFT SIDE | Performed by: RADIOLOGY

## 2024-05-17 PROCEDURE — 99024 POSTOP FOLLOW-UP VISIT: CPT | Performed by: STUDENT IN AN ORGANIZED HEALTH CARE EDUCATION/TRAINING PROGRAM

## 2024-05-17 PROCEDURE — 73502 X-RAY EXAM HIP UNI 2-3 VIEWS: CPT | Mod: LT

## 2024-05-17 PROCEDURE — 1160F RVW MEDS BY RX/DR IN RCRD: CPT | Performed by: STUDENT IN AN ORGANIZED HEALTH CARE EDUCATION/TRAINING PROGRAM

## 2024-05-17 ASSESSMENT — PAIN SCALES - GENERAL: PAINLEVEL_OUTOF10: 0 - NO PAIN

## 2024-05-17 ASSESSMENT — PAIN - FUNCTIONAL ASSESSMENT
PAIN_FUNCTIONAL_ASSESSMENT: 0-10
PAIN_FUNCTIONAL_ASSESSMENT: NO/DENIES PAIN

## 2024-05-17 NOTE — PROGRESS NOTES
Physical Therapy  Physical Therapy Evaluation and Treatment      Patient Name: Rafael Walter  MRN: [id number]  Today's Date: 5/17/2024  Time Calculation  Start Time: 1052  Stop Time: 1134  Time Calculation (min): 42 min   Visit Number: 4  Approved Visits: BEVERLY Choi required: No  Medicare Certification Date Range: 05/01/2024 to 07/30/2024    Assessment:    Patient demonstrates good progression with his POC 7 weeks s/p left anterior JAC. To improve his mobility, the 5-min warm up on the stationary bike was continued and hip 3-way with the RB. Patient continues to demonstrate good progression with his HEP so to challenge his stability we continued the hook lying glute bridges on the BOSU. To challenge his strength, DL and SL on the leg press were continued and he demonstrated moderate fatigue after these exercises. He demonstrated good control with these exercises. We continued fwd and lateral step ups to improve his strength and balance. He demonstrated good balance and control with SLB on the airex pad. He demonstrated improved 5x STS from 15.9 seconds to 11.8 seconds, meeting the normative value for his age group. Overall, he continues to show good improvement in his strength, endurance, and balance.    Care was provided to this patient by a student who was under direct supervision and guidance of the co-signed licensed physical therapist.     Plan:    Continue functional strengthening activities and improve balance and endurance.    Current Problem:   1. Left hip pain        2. Stiffness of left hip joint        3. Aftercare following left hip joint replacement surgery              Subjective    General:  Patient presents 7 weeks s/p left anterior JAC. Patient continues to report that he is progressing well on the left hip, but reports that he is having increasing pain on the right. He meet with Dr. Christianson this morning, 5/17/2024, to discuss a JAC on the right. He reports that he continues to go to the gym and has  incorporated the leg press into his workouts.   Precautions:  Precautions  Precautions Comment: Anterior JAC precautions, low fall risk  Pain:  Pain Assessment  Pain Assessment: 0-10  Pain Score: 0 - No pain (Noticeable pain on the right hip.)    Objective   Objective 5/17/2024:  5xSTS: 11.83 seconds   Gait: antalgic pattern and compensated trendelenburg on R and decreased hip extension on the R     Initial Evaluation Objective 5/1/2024:  Functional Performance:  STS transfer: Needed use of bilateral UE support    5x sit to stand: 15.95 seconds -- needed use of UE support and appeared fatigued after 5 reps  SLB: Slight pelvic drop bilaterally  Stairs negotiation: Slight decreased L LE drive and eccentric control L LE; slight UE support with ascending and descending   Gait Analysis: Discharged cane last week; slight pelvic drop on L  Hip ROM:   Flexion: R: ~115 L: ~90   Extension: Did not test this date   IR(90*): R: ~30 L: ~25   ER(90*): R: ~35 L: ~20  MMT:   Hip Flexion: R: 3+/5 L: 3+/5   Prone Hip Extension: R: 3+/5 L: 3+/5   Hip Abduction: R: 4/5 L: 3+/5  Observation: No signs or symptoms of infection or swelling; incision intact  Palpation: No noted tenderness    Outcome Measures:    Not tested at this date.    Treatments:   Therapeutic Exercise:  5-min warmup on stationary bike   Hip 3-way w/ RB  Hamstring curls w/ RB 2x8  Hook lying glute bridge BOSU ball 3x10  Leg press DL w/ 1 yellow resistance 2x8  Leg press SL 2x8  FWD Step ups 2x20  Lateral step ups 2x20  SLB on airex pad [id number] holds    EDUCATION:   Continued to educate him surrounding his current HEP.    Goals:  Patient will demonstrate proper performance and understanding of his HEP of active strengthening and mobility exercises to demonstrate his motivation to return to his prior level of no pain.   Patient will increase his overall strength from 3+/5 to 5/5 to improve his overall strength and reduce his pain levels.   Patient will demonstrate the  ability to complete 15 continuous STS without use of UE support to improve his endurance and overall functional strength.  Patient will demonstrate the ability to complete 5xSTS <12.6seconds to meet age based norm value to improve his endurance and overall functional ability.  Patient will report the ability to complete walking around the track for >30 minutes without reproduction of pain or fatigue to improve endurance and overall functional strength.  Patient will report the ability to complete a swimming workout without reproduction of pain to improve endurance and strength, and return patient to prior level of functioning.  Patient will report the ability to complete a strength training workout without reproduction of pain to improve strength and return patient to prior level of functioning.

## 2024-05-21 ENCOUNTER — APPOINTMENT (OUTPATIENT)
Dept: PHYSICAL THERAPY | Facility: CLINIC | Age: 74
End: 2024-05-21
Payer: MEDICARE

## 2024-05-21 NOTE — PROGRESS NOTES
Patient ID: Rafael Walter is a 73 y.o. male.    Subjective    The patient has a history of CLL dated first in 2019, most recently having completed Venetoclax + ritux in 7/2022, details below.     Today he admits mild fatigue. Had Lt hip replacement in 3/2024, and planning to have Rt hip replacement in 9/2024.  No fever, weight loss, night sweating. No nausea.     Treatment Synopsis:   - 1/2016 until 2/2019, CASE 5913 (curcumin + vitamin D for CLL).   - One cycle of Chlorambucil  (started on 6/10/2019 8mg per day) and obinutuzumab (started 7/22/19 when PLT <100).  Obinutuzumab restarted on 9/4/19 with profound neutropenia .  - Venetoclax 200 mg daily (with fluconazole) with monthly rituximab  6/1/2020.  - Cycle #2 started on 7/13/20.  - Cycle #3 started on 8/10/20-therapy changed from 200 mg venetoclax dosing to 400  mg and fluconazole discontinued.  - Cycle #4 9/10/20- clon seq showed excellent response, CT chest showed resolution of bibasilar infiltrates.  - Cycle #5 started 10/8/2020.  - Cycle #6 11/5/2020- completed rituximab.  - Venetoclax continued through 7/2022 (2 full years).            Objective    BSA: 2.11 meters squared  /73 (BP Location: Left arm, Patient Position: Sitting, BP Cuff Size: Adult)   Pulse 71   Temp 36 °C (96.8 °F) (Skin)   Resp 16   Wt 84.1 kg (185 lb 4.8 oz)   SpO2 100%   BMI 23.16 kg/m²      Physical Exam  Constitutional:       General: He is not in acute distress.     Appearance: He is not toxic-appearing.   HENT:      Head: Normocephalic.      Nose: Nose normal.      Mouth/Throat:      Mouth: Mucous membranes are moist.   Eyes:      Extraocular Movements: Extraocular movements intact.      Pupils: Pupils are equal, round, and reactive to light.   Cardiovascular:      Rate and Rhythm: Normal rate and regular rhythm.      Heart sounds: No murmur heard.  Pulmonary:      Effort: Pulmonary effort is normal.      Breath sounds: Normal breath sounds.   Abdominal:      General:  Bowel sounds are normal.      Palpations: Abdomen is soft. There is no mass.      Tenderness: There is no abdominal tenderness. There is no rebound.   Musculoskeletal:         General: No swelling, tenderness, deformity or signs of injury.      Right lower leg: No edema.      Left lower leg: No edema.   Skin:     Coloration: Skin is not jaundiced.      Findings: No bruising, lesion or rash.   Neurological:      Mental Status: He is alert and oriented to person, place, and time.      Cranial Nerves: No cranial nerve deficit.      Motor: No weakness.      Gait: Gait normal.   Psychiatric:         Mood and Affect: Mood normal.         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  - S/p curcumin + vit D [id number] (CASE 5913).  - WBC progressed and peaked at 286k on 6/4/2019. (was 184k IN 2/2019)  - S/p chlorambucil + obinutuzumab started 6/2019; c/b mold PNA.  - By June 2020 WBC was essentially normal, but clonoseq shows persistent clones (>8000) in blood.  - S/p Venetoclax + rituximab (6/2020-11/2020). Venetoclax continued through 7/2022.  - Doing well OFF therapy at this time. CBC/ALC stable. No new worrisome symptoms reported.  - 3/21/2024: clinically doing well without worrisome signs of relapse. No new lab. Labs in 12/2023 show mildly decreased PLT level, which has actually been recovering. No clear indication to initiate new treatments. MRD remains an investigational tool. Will defer testing.   -5/10/2024: CBC shows improvement in HGB PLT. Continue surveillance.      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 No recent infections.  - He is no longer on acyclovir or Bactrim.    Plan:  -Monitor for relapse.  -RTC in OCT. Labs 1 week before.      Time spent > 25  min, with more than 50% on counseling and coordinating care.     Cancer Staging   No matching staging information was found for the patient.      Oncology History    No history exists.                 Cecilio Luciano MD PhD

## 2024-05-22 ENCOUNTER — TREATMENT (OUTPATIENT)
Dept: PHYSICAL THERAPY | Facility: CLINIC | Age: 74
End: 2024-05-22
Payer: MEDICARE

## 2024-05-22 DIAGNOSIS — M25.552 LEFT HIP PAIN: Primary | ICD-10-CM

## 2024-05-22 DIAGNOSIS — M25.652 STIFFNESS OF LEFT HIP JOINT: ICD-10-CM

## 2024-05-22 DIAGNOSIS — Z47.1 AFTERCARE FOLLOWING LEFT HIP JOINT REPLACEMENT SURGERY: ICD-10-CM

## 2024-05-22 DIAGNOSIS — Z96.642 AFTERCARE FOLLOWING LEFT HIP JOINT REPLACEMENT SURGERY: ICD-10-CM

## 2024-05-22 PROCEDURE — 97110 THERAPEUTIC EXERCISES: CPT | Mod: GP

## 2024-05-22 ASSESSMENT — PAIN - FUNCTIONAL ASSESSMENT: PAIN_FUNCTIONAL_ASSESSMENT: 0-10

## 2024-05-22 ASSESSMENT — PAIN SCALES - GENERAL: PAINLEVEL_OUTOF10: 0 - NO PAIN

## 2024-05-22 NOTE — PROGRESS NOTES
Physical Therapy  Physical Therapy Evaluation and Treatment      Patient Name: Rafael Walter  MRN: [id number]  Today's Date: 5/22/2024  Time Calculation  Start Time: 1046  Stop Time: 1125  Time Calculation (min): 39 min   Visit Number: 5  Approved Visits: BEVERLY Choi required: No  Medicare Certification Date Range: 05/01/2024 to 07/30/2024    Assessment:    Patient demonstrates good progression with his POC 7 weeks and 5 days s/p left anterior JAC. To improve his mobility, the 5-min warm up on the stationary bike was continued. Patient continues to demonstrate good progression with his HEP so to challenge his stability we continued the hook lying glute bridges on the BOSU. He demonstrated slight fatigue following the end of this exercise. To challenge his strength, DL on the leg press, STS w/ 7lb weight, and hamstring curls were continued and he demonstrated responded well to these exercises. To challenge his balance, SLB, tandem stance, and marching on airex pad were completed. He demonstrated shakiness and challenge with the SLB so we implemented tandem stance. He responded well to this modification and demonstrated good control. Overall, he continues to show good improvement in his strength, endurance, and balance.    Care was provided to this patient by a student who was under direct supervision and guidance of the co-signed licensed physical therapist.     Plan:    Continue functional strengthening activities and improve balance and endurance.    Current Problem:   1. Left hip pain        2. Stiffness of left hip joint        3. Aftercare following left hip joint replacement surgery              Subjective    General:  Patient presents 7 weeks and 5 days s/p left anterior JAC. Patient report that he is doing well with his HEP and feels that he is progressing well. He continues to report pain in his right hip.   Precautions:  Precautions  Precautions Comment: Anterior JAC precautions, low fall risk  Pain:  Pain  Assessment  Pain Assessment: 0-10  Pain Score: 0 - No pain    Objective   Objective 5/17/2024:  5xSTS: 11.83 seconds   Gait: antalgic pattern and compensated trendelenburg on R and decreased hip extension on the R     Initial Evaluation Objective 5/1/2024:  Functional Performance:  STS transfer: Needed use of bilateral UE support    5x sit to stand: 15.95 seconds -- needed use of UE support and appeared fatigued after 5 reps  SLB: Slight pelvic drop bilaterally  Stairs negotiation: Slight decreased L LE drive and eccentric control L LE; slight UE support with ascending and descending   Gait Analysis: Discharged cane last week; slight pelvic drop on L  Hip ROM:   Flexion: R: ~115 L: ~90   Extension: Did not test this date   IR(90*): R: ~30 L: ~25   ER(90*): R: ~35 L: ~20  MMT:   Hip Flexion: R: 3+/5 L: 3+/5   Prone Hip Extension: R: 3+/5 L: 3+/5   Hip Abduction: R: 4/5 L: 3+/5  Observation: No signs or symptoms of infection or swelling; incision intact  Palpation: No noted tenderness    Outcome Measures:    Not tested at this date.    Treatments:   Therapeutic Exercise:  5-min warmup on stationary bike   Hamstring curls w/ RB 2x8  Hook lying glute bridge BOSU ball 3x10  STS w/ 7 lb. weight  Leg press DL w/ 1 yellow resistance 3x12  SLB on airex pad >5 seconds -- discharged due to shakiness and unsteadiness  Tandem stance on airex pad >30 seconds   Marching on airex pad 2x10    EDUCATION:   Continued to educate him surrounding his current HEP and added tandem stance to his HEP    Goals:  Patient will demonstrate proper performance and understanding of his HEP of active strengthening and mobility exercises to demonstrate his motivation to return to his prior level of no pain.   Patient will increase his overall strength from 3+/5 to 5/5 to improve his overall strength and reduce his pain levels.   Patient will demonstrate the ability to complete 15 continuous STS without use of UE support to improve his endurance and  overall functional strength.  Patient will demonstrate the ability to complete 5xSTS <12.6seconds to meet age based norm value to improve his endurance and overall functional ability.  Patient will report the ability to complete walking around the track for >30 minutes without reproduction of pain or fatigue to improve endurance and overall functional strength.  Patient will report the ability to complete a swimming workout without reproduction of pain to improve endurance and strength, and return patient to prior level of functioning.  Patient will report the ability to complete a strength training workout without reproduction of pain to improve strength and return patient to prior level of functioning.

## 2024-05-23 ENCOUNTER — APPOINTMENT (OUTPATIENT)
Dept: PHYSICAL THERAPY | Facility: CLINIC | Age: 74
End: 2024-05-23
Payer: MEDICARE

## 2024-05-24 ENCOUNTER — TREATMENT (OUTPATIENT)
Dept: PHYSICAL THERAPY | Facility: CLINIC | Age: 74
End: 2024-05-24
Payer: MEDICARE

## 2024-05-24 DIAGNOSIS — M25.552 LEFT HIP PAIN: Primary | ICD-10-CM

## 2024-05-24 DIAGNOSIS — Z96.642 AFTERCARE FOLLOWING LEFT HIP JOINT REPLACEMENT SURGERY: ICD-10-CM

## 2024-05-24 DIAGNOSIS — M25.652 STIFFNESS OF LEFT HIP JOINT: ICD-10-CM

## 2024-05-24 DIAGNOSIS — Z47.1 AFTERCARE FOLLOWING LEFT HIP JOINT REPLACEMENT SURGERY: ICD-10-CM

## 2024-05-24 PROCEDURE — 97110 THERAPEUTIC EXERCISES: CPT | Mod: GP

## 2024-05-24 ASSESSMENT — PAIN - FUNCTIONAL ASSESSMENT: PAIN_FUNCTIONAL_ASSESSMENT: 0-10

## 2024-05-24 ASSESSMENT — PAIN SCALES - GENERAL: PAINLEVEL_OUTOF10: 0 - NO PAIN

## 2024-05-24 NOTE — PROGRESS NOTES
Physical Therapy  Physical Therapy Evaluation and Treatment      Patient Name: Rafael Walter  MRN: [id number]  Today's Date: 5/24/2024  Time Calculation  Start Time: 1047  Stop Time: 1130  Time Calculation (min): 43 min   Visit Number: 6  Approved Visits: BEVERLY Choi required: No  Medicare Certification Date Range: 05/01/2024 to 07/30/2024    Assessment:    Patient demonstrates good progression with his POC 8 weeks  s/p left anterior JAC. To improve his mobility, the 5-min warm up on the stationary bike was continued. Patient continues to demonstrate good progression with his HEP so to challenge his stability we continued the hook lying glute bridges on the BOSU. To challenge his strength, DL on the leg press, STS w/ 11lb weight, and leg press calf raises were continued and he responded well to these exercises. He demonstrate moderate fatigue following  STS with the increased resistance, suggestion good progression in his POC. To challenge his balance, SLB on airex pad and balance taps were completed. He demonstrate improved stability and stance time with SLB compared to the last visit. He demonstrates good improvement in his strength, endurance, and balance. Patient feels ready for independent management so we reviewed his HEP and educated him to continue his HEP.     Care was provided to this patient by a student who was under direct supervision and guidance of the co-signed licensed physical therapist.     Plan:    Patient independent management of HEP and discharge skilled PT    Current Problem:   1. Left hip pain        2. Stiffness of left hip joint        3. Aftercare following left hip joint replacement surgery            Subjective    General:  Patient presents 8 weeks s/p left anterior JAC. Patient report that his left hip is doing well with the exercises, but continues to note pain in the R hip.   Precautions:  Precautions  Precautions Comment: Anterior JAC precautions, low fall risk  Pain:  Pain  Assessment  Pain Assessment: 0-10  Pain Score: 0 - No pain    Objective   Objective 5/24/2024:  Gait: antalgic pattern and compensated trendelenburg on R and decreased hip extension on R  Stairs negotiation: Decreased R LE drive and with ascent requires UE support for propulsion    Objective 5/17/2024:  5xSTS: 11.83 seconds   Gait: antalgic pattern and compensated trendelenburg on R and decreased hip extension on the R     Initial Evaluation Objective 5/1/2024:  Functional Performance:  STS transfer: Needed use of bilateral UE support    5x sit to stand: 15.95 seconds -- needed use of UE support and appeared fatigued after 5 reps  SLB: Slight pelvic drop bilaterally  Stairs negotiation: Slight decreased L LE drive and eccentric control L LE; slight UE support with ascending and descending   Gait Analysis: Discharged cane last week; slight pelvic drop on L  Hip ROM:   Flexion: R: ~115 L: ~90   Extension: Did not test this date   IR(90*): R: ~30 L: ~25   ER(90*): R: ~35 L: ~20  MMT:   Hip Flexion: R: 3+/5 L: 3+/5   Prone Hip Extension: R: 3+/5 L: 3+/5   Hip Abduction: R: 4/5 L: 3+/5  Observation: No signs or symptoms of infection or swelling; incision intact  Palpation: No noted tenderness    Outcome Measures:    Not tested at this date.    Treatments:   Therapeutic Exercise:  5-min warmup on stationary bike   Hook lying glute bridge BOSU ball 3x10  STS w/ 11 lb. Weight -- cued to control his descent onto the bed   Leg press DL w/ 1 yellow resistance 10 reps and Leg press DL 2 w/ 2 yellow 2x10  Calf raises on leg press w/ 1 yellow resistance 2x12   SLB on airex pad >20 seconds   Balance taps 2x15     EDUCATION:   Continued to educate him surrounding his HEP and encouraged him to complete the exercises bilaterally to strengthen his R hip.    Goals:  Patient will demonstrate proper performance and understanding of his HEP of active strengthening and mobility exercises to demonstrate his motivation to return to his prior  level of no pain.   Patient will increase his overall strength from 3+/5 to 5/5 to improve his overall strength and reduce his pain levels.   Patient will demonstrate the ability to complete 15 continuous STS without use of UE support to improve his endurance and overall functional strength.  Patient will demonstrate the ability to complete 5xSTS <12.6seconds to meet age based norm value to improve his endurance and overall functional ability.  Patient will report the ability to complete walking around the track for >30 minutes without reproduction of pain or fatigue to improve endurance and overall functional strength.  Patient will report the ability to complete a swimming workout without reproduction of pain to improve endurance and strength, and return patient to prior level of functioning.  Patient will report the ability to complete a strength training workout without reproduction of pain to improve strength and return patient to prior level of functioning.

## 2024-05-30 PROBLEM — M16.11 UNILATERAL PRIMARY OSTEOARTHRITIS, RIGHT HIP: Status: ACTIVE | Noted: 2024-07-11

## 2024-06-13 ENCOUNTER — TELEPHONE (OUTPATIENT)
Dept: PRIMARY CARE | Facility: CLINIC | Age: 74
End: 2024-06-13
Payer: MEDICARE

## 2024-06-13 NOTE — TELEPHONE ENCOUNTER
Pt called complaining of a rash on torso, and spreading to his arms, occurring for about a week.  Informed no openings with Dr. Gold or other providers. Suggested  convenient care locations in [place].  Pt stated he was trying to get in with Dermatology as well.    Please call pt at  [phone]

## 2024-06-21 DIAGNOSIS — I10 BENIGN ESSENTIAL HYPERTENSION: ICD-10-CM

## 2024-06-22 RX ORDER — LOSARTAN POTASSIUM 50 MG/1
50 TABLET ORAL DAILY
Qty: 90 TABLET | Refills: 2 | Status: SHIPPED | OUTPATIENT
Start: 2024-06-22

## 2024-06-24 ENCOUNTER — TELEPHONE (OUTPATIENT)
Dept: PREADMISSION TESTING | Facility: HOSPITAL | Age: 74
End: 2024-06-24
Payer: MEDICARE

## 2024-06-28 ENCOUNTER — APPOINTMENT (OUTPATIENT)
Dept: ORTHOPEDIC SURGERY | Facility: CLINIC | Age: 74
End: 2024-06-28
Payer: MEDICARE

## 2024-06-28 DIAGNOSIS — M16.11 PRIMARY OSTEOARTHRITIS OF RIGHT HIP: Primary | ICD-10-CM

## 2024-07-01 ENCOUNTER — DOCUMENTATION (OUTPATIENT)
Dept: PREADMISSION TESTING | Facility: HOSPITAL | Age: 74
End: 2024-07-01

## 2024-07-01 ENCOUNTER — LAB (OUTPATIENT)
Dept: LAB | Facility: LAB | Age: 74
End: 2024-07-01
Payer: MEDICARE

## 2024-07-01 ENCOUNTER — PRE-ADMISSION TESTING (OUTPATIENT)
Dept: PREADMISSION TESTING | Facility: HOSPITAL | Age: 74
End: 2024-07-01
Payer: MEDICARE

## 2024-07-01 VITALS
WEIGHT: 183.64 LBS | HEIGHT: 75 IN | RESPIRATION RATE: 16 BRPM | HEART RATE: 70 BPM | OXYGEN SATURATION: 100 % | DIASTOLIC BLOOD PRESSURE: 65 MMHG | SYSTOLIC BLOOD PRESSURE: 117 MMHG | TEMPERATURE: 97.2 F | BODY MASS INDEX: 22.83 KG/M2

## 2024-07-01 DIAGNOSIS — R79.9 ABNORMAL FINDING OF BLOOD CHEMISTRY, UNSPECIFIED: ICD-10-CM

## 2024-07-01 DIAGNOSIS — Z01.818 PREOP EXAMINATION: Primary | ICD-10-CM

## 2024-07-01 DIAGNOSIS — Z01.818 PREOP EXAMINATION: ICD-10-CM

## 2024-07-01 LAB
ERYTHROCYTE [DISTWIDTH] IN BLOOD BY AUTOMATED COUNT: 15.3 % (ref 11.5–14.5)
EST. AVERAGE GLUCOSE BLD GHB EST-MCNC: 103 MG/DL
HBA1C MFR BLD: 5.2 %
HCT VFR BLD AUTO: 45.9 % (ref 41–52)
HGB BLD-MCNC: 14.7 G/DL (ref 13.5–17.5)
MCH RBC QN AUTO: 25.1 PG (ref 26–34)
MCHC RBC AUTO-ENTMCNC: 32 G/DL (ref 32–36)
MCV RBC AUTO: 79 FL (ref 80–100)
NRBC BLD-RTO: 0 /100 WBCS (ref 0–0)
PLATELET # BLD AUTO: 143 X10*3/UL (ref 150–450)
RBC # BLD AUTO: 5.85 X10*6/UL (ref 4.5–5.9)
WBC # BLD AUTO: 6 X10*3/UL (ref 4.4–11.3)

## 2024-07-01 PROCEDURE — 87081 CULTURE SCREEN ONLY: CPT | Mod: AHULAB | Performed by: STUDENT IN AN ORGANIZED HEALTH CARE EDUCATION/TRAINING PROGRAM

## 2024-07-01 PROCEDURE — 36415 COLL VENOUS BLD VENIPUNCTURE: CPT

## 2024-07-01 PROCEDURE — 83036 HEMOGLOBIN GLYCOSYLATED A1C: CPT

## 2024-07-01 PROCEDURE — 85027 COMPLETE CBC AUTOMATED: CPT

## 2024-07-01 RX ORDER — DEXTROMETHORPHAN HYDROBROMIDE, GUAIFENESIN 5; 100 MG/5ML; MG/5ML
650 LIQUID ORAL EVERY 8 HOURS PRN
COMMUNITY

## 2024-07-01 RX ORDER — CHLORHEXIDINE GLUCONATE ORAL RINSE 1.2 MG/ML
15 SOLUTION DENTAL 2 TIMES DAILY
Qty: 473 ML | Refills: 0 | Status: SHIPPED | OUTPATIENT
Start: 2024-07-01

## 2024-07-01 ASSESSMENT — ENCOUNTER SYMPTOMS
ENDOCRINE NEGATIVE: 1
ARTHRALGIAS: 1
GASTROINTESTINAL NEGATIVE: 1
NEUROLOGICAL NEGATIVE: 1
CONSTITUTIONAL NEGATIVE: 1
BRUISES/BLEEDS EASILY: 1
RESPIRATORY NEGATIVE: 1

## 2024-07-01 NOTE — CPM/PAT NURSE NOTE
CPM/PAT Nurse Note      Name: Rafael Walter (Rafael Walter)  /Age: 1950/73 y.o.       Past Medical History:   Diagnosis Date    Abnormal finding of blood chemistry, unspecified 2012    Abnormal blood chemistry    Anxiety     BPH (benign prostatic hyperplasia)     Claustrophobia     unable to lay flat    CLL (chronic lymphocytic leukemia) (Multi)     in remission, off oral meds x 2 years, last Hem/Onc visit 23-stable    Gout     History of splenomegaly     resolved per patient    Hyperlipidemia     Hypertension     Long-term current use of intravenous immunoglobulin (IVIG)     next treatment 3/25/24    Lung nodule     Lupus nephritis (Multi)     Osteoarthritis     Skin cancer 10/19/2014    left upper face    Unilateral primary osteoarthritis, right hip     Vitamin D deficiency        Past Surgical History:   Procedure Laterality Date    BONE MARROW BIOPSY      2019, 2020    COLONOSCOPY  2016    HERNIA REPAIR  2016    Inguinal hernia    HIP ARTHROPLASTY Left 2024    RENAL BIOPSY      UMBILICAL HERNIA REPAIR         Patient Sexual activity questions deferred to the physician.    Family History   Problem Relation Name Age of Onset    Other (parkinson) Mother      Pulmonary embolism Father      Arthritis Sister      Cancer Sister      Prostate cancer Brother         No Known Allergies    Prior to Admission medications    Medication Sig Start Date End Date Taking? Authorizing Provider   acetaminophen (Tylenol 8 HOUR) 650 mg ER tablet Take 1 tablet (650 mg) by mouth every 8 hours if needed for mild pain (1 - 3). Do not crush, chew, or split.    Historical Provider, MD   ALPRAZolam (Xanax) 0.5 mg tablet Take 1 tablet (0.5 mg) by mouth once daily as needed. 22   Historical Provider, MD   apixaban (Eliquis) 2.5 mg tablet Take 1 tablet (2.5 mg) by mouth 2 times a day for 35 doses. Start morning of postoperative day 2 and continue for 5 weeks  Patient not taking:  Reported on 7/1/2024 3/20/24 4/16/24  Fanta Christianson MD   cholecalciferol (Vitamin D3) 25 MCG (1000 UT) capsule Take 1 capsule (25 mcg) by mouth once daily.    Historical Provider, MD   diphenhydrAMINE (BENADryl) 50 mg tablet Take 1 tablet (50 mg) by mouth as needed at bedtime for itching.    Historical Provider, MD   losartan (Cozaar) 50 mg tablet TAKE 1 TABLET BY MOUTH EVERY DAY 6/22/24   Milo Gold DO   pantoprazole (ProtoNix) 40 mg EC tablet Take 1 tablet (40 mg) by mouth once daily in the morning. Take before meals. Do not crush, chew, or split.  Patient not taking: Reported on 7/1/2024 3/20/24 4/28/24  Fanta Christianson MD   sildenafil (Viagra) 50 mg tablet Take 1 tablet (50 mg) by mouth once daily as needed for erectile dysfunction. 1 HOUR BEFORE SEXUAL ACTIVITY 5/3/24   Milo Gold DO   diphenhydrAMINE (Sominex) 25 mg tablet Take 1 tablet (25 mg) by mouth as needed at bedtime for sleep.  7/1/24  Historical Provider, MD   naproxen (Naprosyn) 500 mg tablet TAKE 1 TABLET (500 MG) BY MOUTH 2 TIMES A DAY AS NEEDED FOR MILD PAIN (1 - 3) (PAIN). 4/18/24 7/1/24  Milo Gold DO   ondansetron (Zofran) 4 mg tablet Take 1 tablet (4 mg) by mouth every 8 hours if needed for nausea or vomiting. 3/20/24 7/1/24  Fanta Christianson MD        PAT ROS     DASI Risk Score    No data to display       Caprini DVT Assessment    No data to display       Modified Frailty Index    No data to display       CHADS2 Stroke Risk  Current as of 4 hours ago        N/A 3 to 100%: High Risk   2 to < 3%: Medium Risk   0 to < 2%: Low Risk     Last Change: N/A          This score determines the patient's risk of having a stroke if the patient has atrial fibrillation.        This score is not applicable to this patient. Components are not calculated.          Revised Cardiac Risk Index    No data to display       Apfel Simplified Score    No data to display       Risk Analysis Index Results This Encounter    No data found in  the last 1 encounters.         Nurse Plan of Action:     After Visit Summary (AVS) reviewed and patient verbalized good understanding of medications and NPO instructions.  Pre-op infection prevention measures:  CHG showers and mouthwash reviewed, understanding voiced.  CHG soap given and patient verbalized need to pick CHG mouthwash at their preferred local pharmacy.

## 2024-07-01 NOTE — PREPROCEDURE INSTRUCTIONS
Medication List            Accurate as of July 1, 2024 10:46 AM. Always use your most recent med list.                acetaminophen 650 mg ER tablet  Commonly known as: Tylenol 8 HOUR  Medication Adjustments for Surgery: Take morning of surgery with sip of water, no other fluids     ALPRAZolam 0.5 mg tablet  Commonly known as: Xanax  Medication Adjustments for Surgery: Take morning of surgery with sip of water, no other fluids     diphenhydrAMINE 50 mg tablet  Commonly known as: BENADryl  Medication Adjustments for Surgery: Continue until night before surgery     Eliquis 2.5 mg tablet  Generic drug: apixaban  Take 1 tablet (2.5 mg) by mouth 2 times a day for 35 doses. Start morning of postoperative day 2 and continue for 5 weeks  Medication Adjustments for Surgery: Stop 3 days before surgery     losartan 50 mg tablet  Commonly known as: Cozaar  TAKE 1 TABLET BY MOUTH EVERY DAY  Medication Adjustments for Surgery: Continue until night before surgery     pantoprazole 40 mg EC tablet  Commonly known as: ProtoNix  Take 1 tablet (40 mg) by mouth once daily in the morning. Take before meals. Do not crush, chew, or split.  Medication Adjustments for Surgery: Take morning of surgery with sip of water, no other fluids     sildenafil 50 mg tablet  Commonly known as: Viagra  Take 1 tablet (50 mg) by mouth once daily as needed for erectile dysfunction. 1 HOUR BEFORE SEXUAL ACTIVITY  Medication Adjustments for Surgery: Stop 3 days before surgery     Vitamin D3 25 MCG (1000 UT) capsule  Generic drug: cholecalciferol  Medication Adjustments for Surgery: Continue until night before surgery                        **Concerning above medication instructions, if medication is normally taken at night, continue normal schedule.**  **DO NOT TAKE NIGHT PRIOR AND MORNING OF SURGERY**    CONTACT SURGEON'S OFFICE IF YOU DEVELOP:  * Fever = 100.4 F   * New respiratory symptoms (e.g. cough, shortness of breath, respiratory distress, sore  throat)  * Recent loss of taste or smell  *Flu like symptoms such as headache, fatigue or gastrointestinal symptoms  * You develop any open sores, shingles, burning or painful urination   AND/OR:  * You no longer wish to have the surgery.  * Any other personal circumstances change that may lead to the need to cancel or defer this surgery.  *You were admitted to any hospital within one week of your planned procedure.    SMOKING:  *Quitting smoking can make a huge difference to your health and recovery from surgery.    *If you need help with quitting, call 7-139-QUIT-NOW.    THE DAY BEFORE SURGERY:  *Do not eat any food after midnight the night before surgery.   *You are permitted to drink clear liquids (i.e. water, black coffee (no milk or cream), tea, apple juice and electrolyte drinks (gatorade)) up to 13.5 ounces, up to 2 hours before your arrival time.  *You may chew gum until 2 hours before your surgery    SURGICAL TIME  *You will be contacted between 2 p.m. and 6 p.m. the business day before your surgery with your arrival time.  *If you haven't received a call by 6pm, call [phone].  *Scheduled surgery times may change and you will be notified if this occurs-check your personal voicemail for any updates.    ON THE MORNING OF SURGERY:  *Wear comfortable, loose fitting clothing.   *Do not use moisturizers, creams, lotions or perfume.  *All jewelry and valuables should be left at home.  *Prosthetic devices such as contact lenses, hearing aids, dentures, eyelash extensions, hairpins and body piercing must be removed before surgery.    BRING WITH YOU:  *Photo ID and insurance card  *Current list of medicines and allergies  *Pacemaker/Defibrillator/Heart stent cards  *CPAP machine and mask  *Slings/splints/crutches  *Copy of your complete Advanced Directive/DHPOA-if applicable  *Neurostimulator implant remote    PARKING AND ARRIVAL:  *Check in at the Main Entrance desk and let them know you are here for  surgery.  *You will be directed to the 2nd floor surgical waiting area.    AFTER OUTPATIENT SURGERY:  *A responsible adult MUST accompany you at the time of discharge and stay with you for 24 hours after your surgery.  *You may NOT drive yourself home after surgery.  *You may use a taxi or ride sharing service ([employer], [employer]) to return home ONLY if you are accompanied by a friend or family member.  *Instructions for resuming your medications will be provided by your surgeon.

## 2024-07-01 NOTE — CPM/PAT H&P
[facility]/PAT Evaluation       Name: Rafael Walter (Rafael Walter)  /Age: 1950/73 y.o.     In-Person           HPI        Date of Consult: 24    Referring Provider: Dr. Christianson    Surgery, Date, and Length: Right total hip arthroplasty. Anterior approach. 24    Rafael Walter is a 73 year-old male who presents to the [facility] for perioperative risk assessment prior to surgery.    Patient presents with a primary diagnosis of right hip pain. He reports limping due to end-stage arthritis of right hip. Patient is walking with nothing for assistive device. The patient goes up and down stairs normally. Patient can walk 6 blocks. Patient recently had left hip replacement done in 3/2024 with no complications       This note was created in part upon personal review of patient's medical records.      Patient is scheduled to have  Right total hip arthroplasty. Anterior approach      Pt denies any past history of anesthetic complications such as PONV, awareness, prolonged sedation, dental damage, aspiration, cardiac arrest, difficult intubation, difficult I.V. access or unexpected hospital admissions.  NO malignant hyperthermia and or pseudocholinesterase deficiency.  No history of blood transfusions     STOP BANG 2    The patient is not a Mosque and will accept blood and blood products if medically indicated.   Type and screen not sent.     Past Medical History:   Diagnosis Date    Abnormal finding of blood chemistry, unspecified 2012    Abnormal blood chemistry    Anxiety     BPH (benign prostatic hyperplasia)     Claustrophobia     unable to lay flat    CLL (chronic lymphocytic leukemia) (Multi)     in remission, off oral meds x 2 years, last Hem/Onc visit 23-stable    Gout     History of splenomegaly     resolved per patient    Hyperlipidemia     Hypertension     Long-term current use of intravenous immunoglobulin (IVIG)     next treatment 3/25/24    Lung nodule     Lupus nephritis  (Multi)     Osteoarthritis     Skin cancer 10/19/2014    left upper face    Unilateral primary osteoarthritis, right hip     Vitamin D deficiency        Past Surgical History:   Procedure Laterality Date    BONE MARROW BIOPSY      8/2019, 1/20/2020    COLONOSCOPY  08/02/2016    HERNIA REPAIR  05/18/2016    Inguinal hernia    HIP ARTHROPLASTY Left 03/29/2024    RENAL BIOPSY      UMBILICAL HERNIA REPAIR  2015         Family History   Problem Relation Name Age of Onset    Other (parkinson) Mother      Pulmonary embolism Father      Arthritis Sister      Cancer Sister      Prostate cancer Brother       Social History     Tobacco Use    Smoking status: Never    Smokeless tobacco: Never   Vaping Use    Vaping status: Never Used   Substance Use Topics    Alcohol use: Not Currently    Drug use: Never      No Known Allergies       Current Outpatient Medications:     acetaminophen (Tylenol 8 HOUR) 650 mg ER tablet, Take 1 tablet (650 mg) by mouth every 8 hours if needed for mild pain (1 - 3). Do not crush, chew, or split., Disp: , Rfl:     cholecalciferol (Vitamin D3) 25 MCG (1000 UT) capsule, Take 1 capsule (25 mcg) by mouth once daily., Disp: , Rfl:     diphenhydrAMINE (BENADryl) 50 mg tablet, Take 1 tablet (50 mg) by mouth as needed at bedtime for itching., Disp: , Rfl:     losartan (Cozaar) 50 mg tablet, TAKE 1 TABLET BY MOUTH EVERY DAY, Disp: 90 tablet, Rfl: 2    sildenafil (Viagra) 50 mg tablet, Take 1 tablet (50 mg) by mouth once daily as needed for erectile dysfunction. 1 HOUR BEFORE SEXUAL ACTIVITY, Disp: 10 tablet, Rfl: 1    ALPRAZolam (Xanax) 0.5 mg tablet, Take 1 tablet (0.5 mg) by mouth once daily as needed., Disp: , Rfl:     apixaban (Eliquis) 2.5 mg tablet, Take 1 tablet (2.5 mg) by mouth 2 times a day for 35 doses. Start morning of postoperative day 2 and continue for 5 weeks (Patient not taking: Reported on 7/1/2024), Disp: 35 tablet, Rfl: 0    pantoprazole (ProtoNix) 40 mg EC tablet, Take 1 tablet (40 mg) by  "mouth once daily in the morning. Take before meals. Do not crush, chew, or split. (Patient not taking: Reported on 7/1/2024), Disp: 30 tablet, Rfl: 0            PAT ROS:   Constitutional:   neg    Neuro/Psych:   neg    Eyes:    Wears glasses  Ears:   neg    Nose:   neg    Mouth:   neg    Throat:   neg    Neck:   Cardio:    Functional 4 mets. Denies sob walking from pat to parking lot but at times physical activity can be limited due to pain  Respiratory:   neg    Endocrine:   neg    GI:   neg    :   neg    Musculoskeletal:    arthralgias (right hip)  Hematologic:    bruises/bleeds easily (bilateral forearms)  Skin:  neg        Physical Exam  Vitals reviewed. Physical exam within normal limits.  (bilateral hearing aides)         PAT AIRWAY:   Airway:     Mallampati::  II    Neck ROM::  Full   Full dentures upper and lower      Heart Rate:  [70]   Temp:  [36.2 °C (97.2 °F)]   Resp:  [16]   BP: (117)/(65)   Height:  [190.5 cm (6' 3\")]   Weight:  [83.3 kg (183 lb 10.3 oz)]   SpO2:  [100 %]      Lab Results   Component Value Date    WBC 6.0 07/01/2024    HGB 14.7 07/01/2024    HCT 45.9 07/01/2024    MCV 79 (L) 07/01/2024     (L) 07/01/2024        Lab Results   Component Value Date    GLUCOSE 85 05/10/2024    CALCIUM 9.3 05/10/2024     05/10/2024    K 4.2 05/10/2024    CO2 29 05/10/2024     05/10/2024    BUN 14 05/10/2024    CREATININE 0.82 05/10/2024      Lab Results   Component Value Date    HGBA1C 5.2 07/01/2024        Encounter Date: 03/26/24   ECG 12 Lead   Result Value    Ventricular Rate 75    Atrial Rate 75    WY Interval 226    QRS Duration 138    QT Interval 416    QTC Calculation(Bazett) 464    P Axis 24    R Axis -50    T Axis 67    QRS Count 12    Q Onset 217    P Onset 104    P Offset 169    T Offset 425    QTC Fredericia 447    Narrative    Sinus rhythm with 1st degree AV block  Left axis deviation  Nonspecific intraventricular block  Cannot rule out Septal infarct (cited on or before " 07-OCT-2019)  Abnormal ECG  When compared with ECG of 07-OCT-2019 18:23,  Significant changes have occurred  Confirmed by Josef Blankenship ([id number]) on 3/27/2024 8:48:34 AM          Assessment and Plan:         Patient is a 73-year-old female scheduled for a with Dr. Christianson on 7/11/24.  Patient has no active cardiac symptoms.   Patient denies any chest pain, tightness, heaviness, pressure, radiating pain, palpitations, irregular heartbeats, lightheadedness, cough, congestion, shortness of breath, LOPEZ, PND, near syncope, weight loss or gain.     RCRI  1, 6 % Risk of MACE    Cardiology:  -- controlled HTN: instruct to hold anti-hypertensive medications the DOS      Hematology:  Patient instructed to ambulate as soon as possible postoperatively to decrease thromboembolic risk.   Initiate mechanical DVT prophylaxis as soon as possible and initiate chemical prophylaxis when deemed safe from a bleeding standpoint post surgery.   -- History of CLL in remission: CBC ordered  -- history of thrombocytopenia: CBC ordered    Caprini: 10    Renal:  -- Recommendations to avoid nephrotoxic drugs and carefully monitor fluid status to maintain euvolemia. Use dose adjusted medications as needed for the underlying level of renal function.    MRSA AND HGBA1C ORDERED PER PROTOCOL    Risk assessment complete.  Patient is scheduled for a low surgical risk procedure.        Preoperative medication instructions were provided and reviewed with the patient.  Any additional testing or evaluation was explained to the patient.  Nothing by mouth instructions were discussed and patient's questions were answered prior to conclusion to this encounter.  Patient verbalized understanding of preoperative instructions given in preadmission testing; discharge instructions available in EMR.    This note was dictated by a speech recognition.  Minor errors may have been detected in a speech recognition.

## 2024-07-01 NOTE — H&P (VIEW-ONLY)
[facility]/PAT Evaluation       Name: Rafael Walter (Rafael Walter)  /Age: 1950/73 y.o.     In-Person           HPI        Date of Consult: 24    Referring Provider: Dr. Christianson    Surgery, Date, and Length: Right total hip arthroplasty. Anterior approach. 24    Rafael Walter is a 73 year-old male who presents to the [facility] for perioperative risk assessment prior to surgery.    Patient presents with a primary diagnosis of right hip pain. He reports limping due to end-stage arthritis of right hip. Patient is walking with nothing for assistive device. The patient goes up and down stairs normally. Patient can walk 6 blocks. Patient recently had left hip replacement done in 3/2024 with no complications       This note was created in part upon personal review of patient's medical records.      Patient is scheduled to have  Right total hip arthroplasty. Anterior approach      Pt denies any past history of anesthetic complications such as PONV, awareness, prolonged sedation, dental damage, aspiration, cardiac arrest, difficult intubation, difficult I.V. access or unexpected hospital admissions.  NO malignant hyperthermia and or pseudocholinesterase deficiency.  No history of blood transfusions     STOP BANG 2    The patient is not a Zoroastrianism and will accept blood and blood products if medically indicated.   Type and screen not sent.     Past Medical History:   Diagnosis Date    Abnormal finding of blood chemistry, unspecified 2012    Abnormal blood chemistry    Anxiety     BPH (benign prostatic hyperplasia)     Claustrophobia     unable to lay flat    CLL (chronic lymphocytic leukemia) (Multi)     in remission, off oral meds x 2 years, last Hem/Onc visit 23-stable    Gout     History of splenomegaly     resolved per patient    Hyperlipidemia     Hypertension     Long-term current use of intravenous immunoglobulin (IVIG)     next treatment 3/25/24    Lung nodule     Lupus nephritis  (Multi)     Osteoarthritis     Skin cancer 10/19/2014    left upper face    Unilateral primary osteoarthritis, right hip     Vitamin D deficiency        Past Surgical History:   Procedure Laterality Date    BONE MARROW BIOPSY      8/2019, 1/20/2020    COLONOSCOPY  08/02/2016    HERNIA REPAIR  05/18/2016    Inguinal hernia    HIP ARTHROPLASTY Left 03/29/2024    RENAL BIOPSY      UMBILICAL HERNIA REPAIR  2015         Family History   Problem Relation Name Age of Onset    Other (parkinson) Mother      Pulmonary embolism Father      Arthritis Sister      Cancer Sister      Prostate cancer Brother       Social History     Tobacco Use    Smoking status: Never    Smokeless tobacco: Never   Vaping Use    Vaping status: Never Used   Substance Use Topics    Alcohol use: Not Currently    Drug use: Never      No Known Allergies       Current Outpatient Medications:     acetaminophen (Tylenol 8 HOUR) 650 mg ER tablet, Take 1 tablet (650 mg) by mouth every 8 hours if needed for mild pain (1 - 3). Do not crush, chew, or split., Disp: , Rfl:     cholecalciferol (Vitamin D3) 25 MCG (1000 UT) capsule, Take 1 capsule (25 mcg) by mouth once daily., Disp: , Rfl:     diphenhydrAMINE (BENADryl) 50 mg tablet, Take 1 tablet (50 mg) by mouth as needed at bedtime for itching., Disp: , Rfl:     losartan (Cozaar) 50 mg tablet, TAKE 1 TABLET BY MOUTH EVERY DAY, Disp: 90 tablet, Rfl: 2    sildenafil (Viagra) 50 mg tablet, Take 1 tablet (50 mg) by mouth once daily as needed for erectile dysfunction. 1 HOUR BEFORE SEXUAL ACTIVITY, Disp: 10 tablet, Rfl: 1    ALPRAZolam (Xanax) 0.5 mg tablet, Take 1 tablet (0.5 mg) by mouth once daily as needed., Disp: , Rfl:     apixaban (Eliquis) 2.5 mg tablet, Take 1 tablet (2.5 mg) by mouth 2 times a day for 35 doses. Start morning of postoperative day 2 and continue for 5 weeks (Patient not taking: Reported on 7/1/2024), Disp: 35 tablet, Rfl: 0    pantoprazole (ProtoNix) 40 mg EC tablet, Take 1 tablet (40 mg) by  "mouth once daily in the morning. Take before meals. Do not crush, chew, or split. (Patient not taking: Reported on 7/1/2024), Disp: 30 tablet, Rfl: 0            PAT ROS:   Constitutional:   neg    Neuro/Psych:   neg    Eyes:    Wears glasses  Ears:   neg    Nose:   neg    Mouth:   neg    Throat:   neg    Neck:   Cardio:    Functional 4 mets. Denies sob walking from pat to parking lot but at times physical activity can be limited due to pain  Respiratory:   neg    Endocrine:   neg    GI:   neg    :   neg    Musculoskeletal:    arthralgias (right hip)  Hematologic:    bruises/bleeds easily (bilateral forearms)  Skin:  neg        Physical Exam  Vitals reviewed. Physical exam within normal limits.  (bilateral hearing aides)         PAT AIRWAY:   Airway:     Mallampati::  II    Neck ROM::  Full   Full dentures upper and lower      Heart Rate:  [70]   Temp:  [36.2 °C (97.2 °F)]   Resp:  [16]   BP: (117)/(65)   Height:  [190.5 cm (6' 3\")]   Weight:  [83.3 kg (183 lb 10.3 oz)]   SpO2:  [100 %]      Lab Results   Component Value Date    WBC 6.0 07/01/2024    HGB 14.7 07/01/2024    HCT 45.9 07/01/2024    MCV 79 (L) 07/01/2024     (L) 07/01/2024        Lab Results   Component Value Date    GLUCOSE 85 05/10/2024    CALCIUM 9.3 05/10/2024     05/10/2024    K 4.2 05/10/2024    CO2 29 05/10/2024     05/10/2024    BUN 14 05/10/2024    CREATININE 0.82 05/10/2024      Lab Results   Component Value Date    HGBA1C 5.2 07/01/2024        Encounter Date: 03/26/24   ECG 12 Lead   Result Value    Ventricular Rate 75    Atrial Rate 75    SC Interval 226    QRS Duration 138    QT Interval 416    QTC Calculation(Bazett) 464    P Axis 24    R Axis -50    T Axis 67    QRS Count 12    Q Onset 217    P Onset 104    P Offset 169    T Offset 425    QTC Fredericia 447    Narrative    Sinus rhythm with 1st degree AV block  Left axis deviation  Nonspecific intraventricular block  Cannot rule out Septal infarct (cited on or before " 07-OCT-2019)  Abnormal ECG  When compared with ECG of 07-OCT-2019 18:23,  Significant changes have occurred  Confirmed by Josef Blankenship ([id number]) on 3/27/2024 8:48:34 AM          Assessment and Plan:         Patient is a 73-year-old female scheduled for a with Dr. Christianson on 7/11/24.  Patient has no active cardiac symptoms.   Patient denies any chest pain, tightness, heaviness, pressure, radiating pain, palpitations, irregular heartbeats, lightheadedness, cough, congestion, shortness of breath, LOPEZ, PND, near syncope, weight loss or gain.     RCRI  1, 6 % Risk of MACE    Cardiology:  -- controlled HTN: instruct to hold anti-hypertensive medications the DOS      Hematology:  Patient instructed to ambulate as soon as possible postoperatively to decrease thromboembolic risk.   Initiate mechanical DVT prophylaxis as soon as possible and initiate chemical prophylaxis when deemed safe from a bleeding standpoint post surgery.   -- History of CLL in remission: CBC ordered  -- history of thrombocytopenia: CBC ordered    Caprini: 10    Renal:  -- Recommendations to avoid nephrotoxic drugs and carefully monitor fluid status to maintain euvolemia. Use dose adjusted medications as needed for the underlying level of renal function.    MRSA AND HGBA1C ORDERED PER PROTOCOL    Risk assessment complete.  Patient is scheduled for a low surgical risk procedure.        Preoperative medication instructions were provided and reviewed with the patient.  Any additional testing or evaluation was explained to the patient.  Nothing by mouth instructions were discussed and patient's questions were answered prior to conclusion to this encounter.  Patient verbalized understanding of preoperative instructions given in preadmission testing; discharge instructions available in EMR.    This note was dictated by a speech recognition.  Minor errors may have been detected in a speech recognition.

## 2024-07-03 LAB — STAPHYLOCOCCUS SPEC CULT: NORMAL

## 2024-07-05 RX ORDER — DOCUSATE SODIUM 100 MG/1
100 CAPSULE, LIQUID FILLED ORAL 2 TIMES DAILY
Qty: 30 CAPSULE | Refills: 0 | Status: SHIPPED | OUTPATIENT
Start: 2024-07-05 | End: 2024-07-27

## 2024-07-05 RX ORDER — MELOXICAM 15 MG/1
15 TABLET ORAL DAILY
Qty: 60 TABLET | Refills: 0 | Status: SHIPPED | OUTPATIENT
Start: 2024-07-05 | End: 2024-09-03

## 2024-07-05 RX ORDER — BUPIVACAINE HCL/EPINEPHRINE 0.5-1:200K
30 VIAL (ML) INJECTION ONCE
Status: CANCELLED | OUTPATIENT
Start: 2024-07-05 | End: 2024-07-05

## 2024-07-05 RX ORDER — ONDANSETRON 4 MG/1
4 TABLET, FILM COATED ORAL EVERY 8 HOURS PRN
Qty: 20 TABLET | Refills: 0 | Status: SHIPPED | OUTPATIENT
Start: 2024-07-05

## 2024-07-05 RX ORDER — CEFAZOLIN 1 G/1
500 INJECTION, POWDER, FOR SOLUTION INTRAVENOUS ONCE
Status: CANCELLED | OUTPATIENT
Start: 2024-07-05 | End: 2024-07-05

## 2024-07-05 RX ORDER — SENNOSIDES 8.6 MG/1
1 TABLET ORAL DAILY
Qty: 15 TABLET | Refills: 0 | Status: SHIPPED | OUTPATIENT
Start: 2024-07-05 | End: 2024-07-27

## 2024-07-05 RX ORDER — NAPROXEN SODIUM 220 MG/1
81 TABLET, FILM COATED ORAL 2 TIMES DAILY
Qty: 3 TABLET | Refills: 0 | Status: SHIPPED | OUTPATIENT
Start: 2024-07-05 | End: 2024-07-14

## 2024-07-05 RX ORDER — CEFADROXIL 500 MG/1
500 CAPSULE ORAL 2 TIMES DAILY
Qty: 14 CAPSULE | Refills: 0 | Status: SHIPPED | OUTPATIENT
Start: 2024-07-05 | End: 2024-07-19

## 2024-07-05 RX ORDER — SODIUM CHLORIDE 9 MG/ML
22 INJECTION INTRAMUSCULAR; INTRAVENOUS; SUBCUTANEOUS ONCE
Status: CANCELLED | OUTPATIENT
Start: 2024-07-05 | End: 2024-07-05

## 2024-07-05 RX ORDER — PANTOPRAZOLE SODIUM 40 MG/1
40 TABLET, DELAYED RELEASE ORAL
Qty: 30 TABLET | Refills: 0 | Status: SHIPPED | OUTPATIENT
Start: 2024-07-05 | End: 2024-08-11

## 2024-07-05 RX ORDER — TRAMADOL HYDROCHLORIDE 50 MG/1
50-100 TABLET ORAL EVERY 6 HOURS PRN
Qty: 40 TABLET | Refills: 0 | Status: SHIPPED | OUTPATIENT
Start: 2024-07-05 | End: 2024-07-17

## 2024-07-05 RX ORDER — ACETAMINOPHEN 500 MG
1000 TABLET ORAL EVERY 8 HOURS
Qty: 360 TABLET | Refills: 0 | Status: SHIPPED | OUTPATIENT
Start: 2024-07-05 | End: 2024-09-03

## 2024-07-05 NOTE — DISCHARGE INSTRUCTIONS
Fanta Christianson MD   Adult Reconstruction and Joint Replacement Surgery  Office: [phone]     Fax: [phone]       Total Hip Arthroplasty   Postoperative Instructions    CONTACT INFORMATION  Fanta Christianson MD  Joint Replacement Surgeon   Ruth Mendez      [phone]     Adina Anne MBA, BSN, RN-BC  Ortho Coordinator [place]  [phone]    Yovani Peralta RN, BSN  Ortho Coordinator [facility]  [phone]    Mojgan Ruggiero BSN-BC  Ortho Nurse Navigator  [phone]    DRIVING AFTER SURGERY   Please discuss post-surgical, long-distance travel with your surgeon. You may not drive until you are off narcotics and cleared by your surgical team.     WOUND CARE   A waterproof dressing was placed over your surgical site. You may shower over this dressing after surgery and pat it dry. Please do not scrub, soak, or submerge your surgical site for at least three weeks after surgery to allow your incision to heal. Avoid using creams and ointments around your surgical site while it is healing.    Your dressing will be removed on post-operative day 7 by your physical therapist. You may leave the incision open to air after the bandage has been removed. Please do not submerge your incision in a hot tub/pool/lake/etc. until cleared by your surgeon. Please contact the office if there are any concerns with your wound.     Pain, swelling, and bruising are normal after total hip replacement surgery. You may alleviate these symptoms by following the post-operative pain regimen that was prescribed, icing your surgical site multiple times a day, and elevating your extremity throughout the day.     ACTIVITY AND HIP PRECAUTIONS  Please follow the post-operative activity and hip precautions that were described to you by your surgical team and physical therapists.    Weightbearing restriction: weightbearing as tolerated     If you had anterior total hip replacement surgery, please avoid  excessive hip extension and external rotation.    If you had posterior total hip replacement surgery, please avoid hip flexion greater than 90 degrees, adduction past midline, and internal rotation beyond neutral.    DISCHARGE MEDICATIONS  Pain  Please take the pain medications that were prescribed to you according to the prescribed schedule. You may not operate a motor vehicle while taking narcotic medications (e.g. Oxycodone, Tramadol).     Please take Tylenol and NSAIDs (if you are able) on a schedule as discussed in clinic. Please take the prescribed Pantoprazole to protect your stomach from ulceration after surgery while taking NSAIDs.     Please wean off the narcotic pain medications as soon as you are able.     Blood-Thinners  Please take the blood thinning medications that were prescribed according to the instructions from your surgeon. These are typically continued for 6 weeks postoperatively. This schedule may differ if you were already on blood-thinning medication prior to your surgery.     Nausea  You may feel nauseous after surgery due to the anesthetics or pain medications you are taking. You may take anti-nausea medication (e.g. Ondansetron) to help with these symptoms.     Stool Softeners   Please take the stool softeners that were prescribed at discharge (e.g. Colace, Senna) while you are on narcotic pain medications to minimize your risk of constipation.    Home Medications   You may restart your home medications at time of discharge according to your discharge instructions.    PHYSICAL THERAPY AND OCCUPATIONAL THERAPY   In-home physical therapy and occupational therapy will start within a few days of your discharge to home. You will transition to outpatient physical therapy around 2-3 weeks after your surgery.     FOLLOW-UP  You will see your surgical team around 2 weeks after surgery for a wound check (unless otherwise arranged) and between 4-6 weeks after surgery for X-rays and clinical  evaluation.    CALL YOUR SURGEON IF YOU HAVE:   Drainage that is not contained by your surgical dressing.  Redness, pain or swelling in your calf.   Severe pain that is not controlled by the pain regimen prescribed.   Fever >101 Fahrenheit presents for at least 48 hours or other signs of infection (wound drainage, redness around your surgical incision, increased joint pain)  Go to the emergency department or call 911 if you experience chest pain, shortness of breath or other emergent symptoms. Do not call your surgeon first.     ANTIBIOTIC PROPHYLAXIS AFTER JOINT REPLACEMENT SURGERY   Although it is a rare occurrence, an artificial joint can become infected by the bloodstream carrying infection from another part of the body to the replaced joint.  Therefore, it is important that any bacterial infection be treated promptly. If you are unsure of whether you need to take antibiotics, please call the office before taking any medication.  We advise that you take antibiotics prior to the following invasive procedures for a lifetime. Please wait at least 3-6 months after joint replacement surgery before undergoing dental work or cleaning.    Please take antibiotics one hour before any of the following procedures:  Routine dental cleaning or dental procedure  Root canal  Podiatry procedures that involve cutting into the skin  Dermatologic procedures that involve cutting into the skin.  (Not required for laser or freezing of skin)  Invasive procedures regarding the urinary tract     Antibiotics are not required for the following procedures:   Manicures/Pedicures  Colonoscopy/Endoscopy/Sigmoidoscopy  Routine gynecologic exams/procedures/PAP smears, D&C's  Cataract/laser eye surgery  Injection or blood work  Routine colds and flu and minor cuts and bruises    You may have a flu shot at any time following your surgery.

## 2024-07-08 ENCOUNTER — LAB (OUTPATIENT)
Dept: LAB | Facility: LAB | Age: 74
End: 2024-07-08
Payer: MEDICARE

## 2024-07-08 DIAGNOSIS — Z96.642 S/P TOTAL LEFT HIP ARTHROPLASTY: ICD-10-CM

## 2024-07-08 DIAGNOSIS — C91.10 CHRONIC LYMPHOCYTIC LEUKEMIA (MULTI): ICD-10-CM

## 2024-07-08 LAB
ALBUMIN SERPL BCP-MCNC: 4.1 G/DL (ref 3.4–5)
ALP SERPL-CCNC: 80 U/L (ref 33–136)
ALT SERPL W P-5'-P-CCNC: 11 U/L (ref 10–52)
ANION GAP SERPL CALC-SCNC: 9 MMOL/L (ref 10–20)
AST SERPL W P-5'-P-CCNC: 14 U/L (ref 9–39)
BASOPHILS # BLD AUTO: 0.01 X10*3/UL (ref 0–0.1)
BASOPHILS NFR BLD AUTO: 0.2 %
BILIRUB SERPL-MCNC: 0.5 MG/DL (ref 0–1.2)
BUN SERPL-MCNC: 14 MG/DL (ref 6–23)
CALCIUM SERPL-MCNC: 9.2 MG/DL (ref 8.6–10.6)
CHLORIDE SERPL-SCNC: 104 MMOL/L (ref 98–107)
CO2 SERPL-SCNC: 29 MMOL/L (ref 21–32)
CREAT SERPL-MCNC: 0.78 MG/DL (ref 0.5–1.3)
EGFRCR SERPLBLD CKD-EPI 2021: >90 ML/MIN/1.73M*2
EOSINOPHIL # BLD AUTO: 0.02 X10*3/UL (ref 0–0.4)
EOSINOPHIL NFR BLD AUTO: 0.5 %
ERYTHROCYTE [DISTWIDTH] IN BLOOD BY AUTOMATED COUNT: 15.2 % (ref 11.5–14.5)
GLUCOSE SERPL-MCNC: 91 MG/DL (ref 74–99)
HCT VFR BLD AUTO: 45.6 % (ref 41–52)
HGB BLD-MCNC: 13.8 G/DL (ref 13.5–17.5)
IMM GRANULOCYTES # BLD AUTO: 0.01 X10*3/UL (ref 0–0.5)
IMM GRANULOCYTES NFR BLD AUTO: 0.2 % (ref 0–0.9)
LYMPHOCYTES # BLD AUTO: 0.58 X10*3/UL (ref 0.8–3)
LYMPHOCYTES NFR BLD AUTO: 13.4 %
MCH RBC QN AUTO: 24.6 PG (ref 26–34)
MCHC RBC AUTO-ENTMCNC: 30.3 G/DL (ref 32–36)
MCV RBC AUTO: 81 FL (ref 80–100)
MONOCYTES # BLD AUTO: 0.08 X10*3/UL (ref 0.05–0.8)
MONOCYTES NFR BLD AUTO: 1.8 %
NEUTROPHILS # BLD AUTO: 3.63 X10*3/UL (ref 1.6–5.5)
NEUTROPHILS NFR BLD AUTO: 83.9 %
NRBC BLD-RTO: 0 /100 WBCS (ref 0–0)
PLATELET # BLD AUTO: 179 X10*3/UL (ref 150–450)
POTASSIUM SERPL-SCNC: 3.9 MMOL/L (ref 3.5–5.3)
PROT SERPL-MCNC: 7.5 G/DL (ref 6.4–8.2)
RBC # BLD AUTO: 5.61 X10*6/UL (ref 4.5–5.9)
SODIUM SERPL-SCNC: 138 MMOL/L (ref 136–145)
WBC # BLD AUTO: 4.3 X10*3/UL (ref 4.4–11.3)

## 2024-07-08 PROCEDURE — 36415 COLL VENOUS BLD VENIPUNCTURE: CPT

## 2024-07-08 PROCEDURE — 80053 COMPREHEN METABOLIC PANEL: CPT

## 2024-07-08 PROCEDURE — 85025 COMPLETE CBC W/AUTO DIFF WBC: CPT

## 2024-07-10 ENCOUNTER — HOME HEALTH ADMISSION (OUTPATIENT)
Dept: HOME HEALTH SERVICES | Facility: HOME HEALTH | Age: 74
End: 2024-07-10
Payer: MEDICARE

## 2024-07-11 ENCOUNTER — ANESTHESIA EVENT (OUTPATIENT)
Dept: OPERATING ROOM | Facility: HOSPITAL | Age: 74
End: 2024-07-11
Payer: MEDICARE

## 2024-07-11 ENCOUNTER — HOSPITAL ENCOUNTER (OUTPATIENT)
Facility: HOSPITAL | Age: 74
Discharge: HOME | End: 2024-07-12
Attending: STUDENT IN AN ORGANIZED HEALTH CARE EDUCATION/TRAINING PROGRAM | Admitting: STUDENT IN AN ORGANIZED HEALTH CARE EDUCATION/TRAINING PROGRAM
Payer: MEDICARE

## 2024-07-11 ENCOUNTER — APPOINTMENT (OUTPATIENT)
Dept: RADIOLOGY | Facility: HOSPITAL | Age: 74
End: 2024-07-11
Payer: MEDICARE

## 2024-07-11 ENCOUNTER — ANESTHESIA (OUTPATIENT)
Dept: OPERATING ROOM | Facility: HOSPITAL | Age: 74
End: 2024-07-11
Payer: MEDICARE

## 2024-07-11 DIAGNOSIS — M16.11 UNILATERAL PRIMARY OSTEOARTHRITIS, RIGHT HIP: Primary | ICD-10-CM

## 2024-07-11 PROBLEM — M16.9 OSTEOARTHRITIS OF HIP: Status: ACTIVE | Noted: 2024-07-11

## 2024-07-11 LAB — GLUCOSE BLD MANUAL STRIP-MCNC: 93 MG/DL (ref 74–99)

## 2024-07-11 PROCEDURE — 3600000018 HC OR TIME - INITIAL BASE CHARGE - PROCEDURE LEVEL SIX: Performed by: STUDENT IN AN ORGANIZED HEALTH CARE EDUCATION/TRAINING PROGRAM

## 2024-07-11 PROCEDURE — 7100000011 HC EXTENDED STAY RECOVERY HOURLY - NURSING UNIT

## 2024-07-11 PROCEDURE — 2500000004 HC RX 250 GENERAL PHARMACY W/ HCPCS (ALT 636 FOR OP/ED): Performed by: ANESTHESIOLOGY

## 2024-07-11 PROCEDURE — 3600000017 HC OR TIME - EACH INCREMENTAL 1 MINUTE - PROCEDURE LEVEL SIX: Performed by: STUDENT IN AN ORGANIZED HEALTH CARE EDUCATION/TRAINING PROGRAM

## 2024-07-11 PROCEDURE — 3700000001 HC GENERAL ANESTHESIA TIME - INITIAL BASE CHARGE: Performed by: STUDENT IN AN ORGANIZED HEALTH CARE EDUCATION/TRAINING PROGRAM

## 2024-07-11 PROCEDURE — 2720000007 HC OR 272 NO HCPCS: Performed by: STUDENT IN AN ORGANIZED HEALTH CARE EDUCATION/TRAINING PROGRAM

## 2024-07-11 PROCEDURE — 7100000001 HC RECOVERY ROOM TIME - INITIAL BASE CHARGE: Performed by: STUDENT IN AN ORGANIZED HEALTH CARE EDUCATION/TRAINING PROGRAM

## 2024-07-11 PROCEDURE — 7100000002 HC RECOVERY ROOM TIME - EACH INCREMENTAL 1 MINUTE: Performed by: STUDENT IN AN ORGANIZED HEALTH CARE EDUCATION/TRAINING PROGRAM

## 2024-07-11 PROCEDURE — RXMED WILLOW AMBULATORY MEDICATION CHARGE

## 2024-07-11 PROCEDURE — 2500000004 HC RX 250 GENERAL PHARMACY W/ HCPCS (ALT 636 FOR OP/ED): Performed by: ANESTHESIOLOGIST ASSISTANT

## 2024-07-11 PROCEDURE — C1713 ANCHOR/SCREW BN/BN,TIS/BN: HCPCS | Performed by: STUDENT IN AN ORGANIZED HEALTH CARE EDUCATION/TRAINING PROGRAM

## 2024-07-11 PROCEDURE — 2500000005 HC RX 250 GENERAL PHARMACY W/O HCPCS: Performed by: ANESTHESIOLOGIST ASSISTANT

## 2024-07-11 PROCEDURE — C1776 JOINT DEVICE (IMPLANTABLE): HCPCS | Performed by: STUDENT IN AN ORGANIZED HEALTH CARE EDUCATION/TRAINING PROGRAM

## 2024-07-11 PROCEDURE — 72170 X-RAY EXAM OF PELVIS: CPT

## 2024-07-11 PROCEDURE — 2500000001 HC RX 250 WO HCPCS SELF ADMINISTERED DRUGS (ALT 637 FOR MEDICARE OP): Performed by: STUDENT IN AN ORGANIZED HEALTH CARE EDUCATION/TRAINING PROGRAM

## 2024-07-11 PROCEDURE — 9420000001 HC RT PATIENT EDUCATION 5 MIN

## 2024-07-11 PROCEDURE — 2500000005 HC RX 250 GENERAL PHARMACY W/O HCPCS: Performed by: STUDENT IN AN ORGANIZED HEALTH CARE EDUCATION/TRAINING PROGRAM

## 2024-07-11 PROCEDURE — 2500000004 HC RX 250 GENERAL PHARMACY W/ HCPCS (ALT 636 FOR OP/ED): Performed by: STUDENT IN AN ORGANIZED HEALTH CARE EDUCATION/TRAINING PROGRAM

## 2024-07-11 PROCEDURE — 3700000002 HC GENERAL ANESTHESIA TIME - EACH INCREMENTAL 1 MINUTE: Performed by: STUDENT IN AN ORGANIZED HEALTH CARE EDUCATION/TRAINING PROGRAM

## 2024-07-11 PROCEDURE — 2780000003 HC OR 278 NO HCPCS: Performed by: STUDENT IN AN ORGANIZED HEALTH CARE EDUCATION/TRAINING PROGRAM

## 2024-07-11 PROCEDURE — 27130 TOTAL HIP ARTHROPLASTY: CPT | Performed by: STUDENT IN AN ORGANIZED HEALTH CARE EDUCATION/TRAINING PROGRAM

## 2024-07-11 PROCEDURE — 82947 ASSAY GLUCOSE BLOOD QUANT: CPT

## 2024-07-11 PROCEDURE — 2500000004 HC RX 250 GENERAL PHARMACY W/ HCPCS (ALT 636 FOR OP/ED)

## 2024-07-11 PROCEDURE — 76000 FLUOROSCOPY <1 HR PHYS/QHP: CPT | Mod: RT

## 2024-07-11 PROCEDURE — 2500000001 HC RX 250 WO HCPCS SELF ADMINISTERED DRUGS (ALT 637 FOR MEDICARE OP): Performed by: ANESTHESIOLOGY

## 2024-07-11 DEVICE — PINNACLE CANCELLOUS BONE SCREW 6.5MM X 25MM
Type: IMPLANTABLE DEVICE | Site: HIP | Status: FUNCTIONAL
Brand: PINNACLE

## 2024-07-11 DEVICE — EMPHASYS ACETABULAR SHELL THREE-HOLE 56MM CEMENTLESS
Type: IMPLANTABLE DEVICE | Site: HIP | Status: FUNCTIONAL
Brand: EMPHASYS

## 2024-07-11 DEVICE — BIOLOX DELTA TS CERAMIC FEMORAL HEAD 12/14 TAPER REVISION DIAMETER 40MM +5
Type: IMPLANTABLE DEVICE | Site: HIP | Status: FUNCTIONAL
Brand: BIOLOX DELTA

## 2024-07-11 DEVICE — ACTIS DUOFIX HIP PROSTHESIS (FEMORAL STEM 12/14 TAPER CEMENTLESS SIZE 8 HIGH COLLAR)  CE
Type: IMPLANTABLE DEVICE | Site: HIP | Status: FUNCTIONAL
Brand: ACTIS

## 2024-07-11 RX ORDER — POLYETHYLENE GLYCOL 3350 17 G/17G
17 POWDER, FOR SOLUTION ORAL DAILY
Status: DISCONTINUED | OUTPATIENT
Start: 2024-07-11 | End: 2024-07-12 | Stop reason: HOSPADM

## 2024-07-11 RX ORDER — SODIUM CHLORIDE 0.9 G/100ML
IRRIGANT IRRIGATION AS NEEDED
Status: DISCONTINUED | OUTPATIENT
Start: 2024-07-11 | End: 2024-07-11 | Stop reason: HOSPADM

## 2024-07-11 RX ORDER — ONDANSETRON HYDROCHLORIDE 2 MG/ML
4 INJECTION, SOLUTION INTRAVENOUS ONCE AS NEEDED
Status: COMPLETED | OUTPATIENT
Start: 2024-07-11 | End: 2024-07-11

## 2024-07-11 RX ORDER — CEFAZOLIN SODIUM 2 G/100ML
2 INJECTION, SOLUTION INTRAVENOUS EVERY 6 HOURS
Status: COMPLETED | OUTPATIENT
Start: 2024-07-11 | End: 2024-07-12

## 2024-07-11 RX ORDER — PROPOFOL 10 MG/ML
INJECTION, EMULSION INTRAVENOUS CONTINUOUS PRN
Status: DISCONTINUED | OUTPATIENT
Start: 2024-07-11 | End: 2024-07-11

## 2024-07-11 RX ORDER — ALBUTEROL SULFATE 0.83 MG/ML
2.5 SOLUTION RESPIRATORY (INHALATION) ONCE AS NEEDED
Status: DISCONTINUED | OUTPATIENT
Start: 2024-07-11 | End: 2024-07-11 | Stop reason: HOSPADM

## 2024-07-11 RX ORDER — HYDRALAZINE HYDROCHLORIDE 20 MG/ML
5 INJECTION INTRAMUSCULAR; INTRAVENOUS EVERY 30 MIN PRN
Status: DISCONTINUED | OUTPATIENT
Start: 2024-07-11 | End: 2024-07-11 | Stop reason: HOSPADM

## 2024-07-11 RX ORDER — OXYCODONE HYDROCHLORIDE 5 MG/1
5 TABLET ORAL EVERY 4 HOURS PRN
Status: DISCONTINUED | OUTPATIENT
Start: 2024-07-11 | End: 2024-07-11

## 2024-07-11 RX ORDER — CEFAZOLIN 1 G/1
INJECTION, POWDER, FOR SOLUTION INTRAVENOUS AS NEEDED
Status: DISCONTINUED | OUTPATIENT
Start: 2024-07-11 | End: 2024-07-11

## 2024-07-11 RX ORDER — LIDOCAINE HYDROCHLORIDE 10 MG/ML
0.1 INJECTION, SOLUTION EPIDURAL; INFILTRATION; INTRACAUDAL; PERINEURAL ONCE
Status: DISCONTINUED | OUTPATIENT
Start: 2024-07-11 | End: 2024-07-11 | Stop reason: HOSPADM

## 2024-07-11 RX ORDER — KETOROLAC TROMETHAMINE 30 MG/ML
15 INJECTION, SOLUTION INTRAMUSCULAR; INTRAVENOUS ONCE
Status: COMPLETED | OUTPATIENT
Start: 2024-07-11 | End: 2024-07-11

## 2024-07-11 RX ORDER — TRAMADOL HYDROCHLORIDE 50 MG/1
25 TABLET ORAL ONCE
Status: COMPLETED | OUTPATIENT
Start: 2024-07-11 | End: 2024-07-11

## 2024-07-11 RX ORDER — PHENYLEPHRINE 10 MG/250 ML(40 MCG/ML)IN 0.9 % SOD.CHLORIDE INTRAVENOUS
CONTINUOUS PRN
Status: DISCONTINUED | OUTPATIENT
Start: 2024-07-11 | End: 2024-07-11

## 2024-07-11 RX ORDER — DIPHENHYDRAMINE HYDROCHLORIDE 50 MG/ML
12.5 INJECTION INTRAMUSCULAR; INTRAVENOUS EVERY 6 HOURS PRN
Status: DISCONTINUED | OUTPATIENT
Start: 2024-07-11 | End: 2024-07-12 | Stop reason: HOSPADM

## 2024-07-11 RX ORDER — OXYCODONE HYDROCHLORIDE 5 MG/1
2.5 TABLET ORAL EVERY 6 HOURS PRN
Status: DISCONTINUED | OUTPATIENT
Start: 2024-07-11 | End: 2024-07-11

## 2024-07-11 RX ORDER — CEFAZOLIN SODIUM 2 G/100ML
2 INJECTION, SOLUTION INTRAVENOUS ONCE
Status: DISCONTINUED | OUTPATIENT
Start: 2024-07-11 | End: 2024-07-11 | Stop reason: HOSPADM

## 2024-07-11 RX ORDER — SODIUM CHLORIDE, SODIUM LACTATE, POTASSIUM CHLORIDE, CALCIUM CHLORIDE 600; 310; 30; 20 MG/100ML; MG/100ML; MG/100ML; MG/100ML
100 INJECTION, SOLUTION INTRAVENOUS CONTINUOUS
Status: DISCONTINUED | OUTPATIENT
Start: 2024-07-11 | End: 2024-07-11

## 2024-07-11 RX ORDER — OXYCODONE HYDROCHLORIDE 5 MG/1
2.5 TABLET ORAL EVERY 4 HOURS PRN
Status: DISCONTINUED | OUTPATIENT
Start: 2024-07-11 | End: 2024-07-11

## 2024-07-11 RX ORDER — SYRING-NEEDL,DISP,INSUL,0.3 ML 29 G X1/2"
297 SYRINGE, EMPTY DISPOSABLE MISCELLANEOUS ONCE AS NEEDED
Status: DISCONTINUED | OUTPATIENT
Start: 2024-07-11 | End: 2024-07-12 | Stop reason: HOSPADM

## 2024-07-11 RX ORDER — TRANEXAMIC ACID 100 MG/ML
INJECTION, SOLUTION INTRAVENOUS AS NEEDED
Status: DISCONTINUED | OUTPATIENT
Start: 2024-07-11 | End: 2024-07-11

## 2024-07-11 RX ORDER — MIDAZOLAM HYDROCHLORIDE 1 MG/ML
INJECTION INTRAMUSCULAR; INTRAVENOUS CONTINUOUS PRN
Status: DISCONTINUED | OUTPATIENT
Start: 2024-07-11 | End: 2024-07-11

## 2024-07-11 RX ORDER — HYDROMORPHONE HYDROCHLORIDE 0.2 MG/ML
0.2 INJECTION INTRAMUSCULAR; INTRAVENOUS; SUBCUTANEOUS EVERY 4 HOURS PRN
Status: DISCONTINUED | OUTPATIENT
Start: 2024-07-11 | End: 2024-07-11

## 2024-07-11 RX ORDER — PHENYLEPHRINE HCL IN 0.9% NACL 1 MG/10 ML
SYRINGE (ML) INTRAVENOUS AS NEEDED
Status: DISCONTINUED | OUTPATIENT
Start: 2024-07-11 | End: 2024-07-11

## 2024-07-11 RX ORDER — TRAMADOL HYDROCHLORIDE 50 MG/1
25 TABLET ORAL EVERY 8 HOURS PRN
Status: DISCONTINUED | OUTPATIENT
Start: 2024-07-11 | End: 2024-07-12 | Stop reason: HOSPADM

## 2024-07-11 RX ORDER — IBUPROFEN 600 MG/1
600 TABLET ORAL 3 TIMES DAILY
Status: DISCONTINUED | OUTPATIENT
Start: 2024-07-11 | End: 2024-07-12 | Stop reason: CLARIF

## 2024-07-11 RX ORDER — CEFAZOLIN 1 G/1
INJECTION, POWDER, FOR SOLUTION INTRAVENOUS AS NEEDED
Status: DISCONTINUED | OUTPATIENT
Start: 2024-07-11 | End: 2024-07-11 | Stop reason: HOSPADM

## 2024-07-11 RX ORDER — NALOXONE HYDROCHLORIDE 0.4 MG/ML
0.2 INJECTION, SOLUTION INTRAMUSCULAR; INTRAVENOUS; SUBCUTANEOUS EVERY 5 MIN PRN
Status: DISCONTINUED | OUTPATIENT
Start: 2024-07-11 | End: 2024-07-12 | Stop reason: HOSPADM

## 2024-07-11 RX ORDER — ASPIRIN 81 MG/1
81 TABLET ORAL 2 TIMES DAILY
Status: DISCONTINUED | OUTPATIENT
Start: 2024-07-12 | End: 2024-07-12 | Stop reason: HOSPADM

## 2024-07-11 RX ORDER — TRAMADOL HYDROCHLORIDE 50 MG/1
50 TABLET ORAL EVERY 8 HOURS PRN
Status: DISCONTINUED | OUTPATIENT
Start: 2024-07-11 | End: 2024-07-12 | Stop reason: HOSPADM

## 2024-07-11 RX ORDER — ACETAMINOPHEN 325 MG/1
650 TABLET ORAL EVERY 6 HOURS SCHEDULED
Status: DISCONTINUED | OUTPATIENT
Start: 2024-07-11 | End: 2024-07-12 | Stop reason: HOSPADM

## 2024-07-11 RX ORDER — LABETALOL HYDROCHLORIDE 5 MG/ML
5 INJECTION, SOLUTION INTRAVENOUS ONCE AS NEEDED
Status: DISCONTINUED | OUTPATIENT
Start: 2024-07-11 | End: 2024-07-11 | Stop reason: HOSPADM

## 2024-07-11 RX ORDER — SCOLOPAMINE TRANSDERMAL SYSTEM 1 MG/1
1 PATCH, EXTENDED RELEASE TRANSDERMAL ONCE
Status: DISCONTINUED | OUTPATIENT
Start: 2024-07-11 | End: 2024-07-12 | Stop reason: HOSPADM

## 2024-07-11 RX ORDER — KETOROLAC TROMETHAMINE 30 MG/ML
15 INJECTION, SOLUTION INTRAMUSCULAR; INTRAVENOUS EVERY 6 HOURS
Status: DISCONTINUED | OUTPATIENT
Start: 2024-07-11 | End: 2024-07-12 | Stop reason: HOSPADM

## 2024-07-11 RX ORDER — ONDANSETRON HYDROCHLORIDE 2 MG/ML
4 INJECTION, SOLUTION INTRAVENOUS EVERY 8 HOURS PRN
Status: DISCONTINUED | OUTPATIENT
Start: 2024-07-11 | End: 2024-07-12 | Stop reason: HOSPADM

## 2024-07-11 RX ORDER — PANTOPRAZOLE SODIUM 40 MG/1
40 TABLET, DELAYED RELEASE ORAL
Status: DISCONTINUED | OUTPATIENT
Start: 2024-07-12 | End: 2024-07-12 | Stop reason: HOSPADM

## 2024-07-11 RX ORDER — BISACODYL 5 MG
10 TABLET, DELAYED RELEASE (ENTERIC COATED) ORAL DAILY PRN
Status: DISCONTINUED | OUTPATIENT
Start: 2024-07-11 | End: 2024-07-12 | Stop reason: HOSPADM

## 2024-07-11 RX ORDER — FENTANYL CITRATE 50 UG/ML
INJECTION, SOLUTION INTRAMUSCULAR; INTRAVENOUS AS NEEDED
Status: DISCONTINUED | OUTPATIENT
Start: 2024-07-11 | End: 2024-07-11

## 2024-07-11 RX ORDER — SODIUM CHLORIDE, SODIUM LACTATE, POTASSIUM CHLORIDE, CALCIUM CHLORIDE 600; 310; 30; 20 MG/100ML; MG/100ML; MG/100ML; MG/100ML
50 INJECTION, SOLUTION INTRAVENOUS CONTINUOUS
Status: DISCONTINUED | OUTPATIENT
Start: 2024-07-11 | End: 2024-07-12 | Stop reason: HOSPADM

## 2024-07-11 RX ORDER — SODIUM CHLORIDE, SODIUM LACTATE, POTASSIUM CHLORIDE, CALCIUM CHLORIDE 600; 310; 30; 20 MG/100ML; MG/100ML; MG/100ML; MG/100ML
100 INJECTION, SOLUTION INTRAVENOUS CONTINUOUS
Status: DISCONTINUED | OUTPATIENT
Start: 2024-07-11 | End: 2024-07-11 | Stop reason: HOSPADM

## 2024-07-11 RX ORDER — ONDANSETRON 4 MG/1
4 TABLET, FILM COATED ORAL EVERY 8 HOURS PRN
Status: DISCONTINUED | OUTPATIENT
Start: 2024-07-11 | End: 2024-07-12 | Stop reason: HOSPADM

## 2024-07-11 SDOH — SOCIAL STABILITY: SOCIAL INSECURITY: DO YOU FEEL ANYONE HAS EXPLOITED OR TAKEN ADVANTAGE OF YOU FINANCIALLY OR OF YOUR PERSONAL PROPERTY?: NO

## 2024-07-11 SDOH — SOCIAL STABILITY: SOCIAL INSECURITY: ABUSE: ADULT

## 2024-07-11 SDOH — SOCIAL STABILITY: SOCIAL INSECURITY: HAS ANYONE EVER THREATENED TO HURT YOUR FAMILY OR YOUR PETS?: NO

## 2024-07-11 SDOH — SOCIAL STABILITY: SOCIAL INSECURITY: WERE YOU ABLE TO COMPLETE ALL THE BEHAVIORAL HEALTH SCREENINGS?: YES

## 2024-07-11 SDOH — SOCIAL STABILITY: SOCIAL INSECURITY: DOES ANYONE TRY TO KEEP YOU FROM HAVING/CONTACTING OTHER FRIENDS OR DOING THINGS OUTSIDE YOUR HOME?: NO

## 2024-07-11 SDOH — SOCIAL STABILITY: SOCIAL INSECURITY: HAVE YOU HAD THOUGHTS OF HARMING ANYONE ELSE?: NO

## 2024-07-11 SDOH — SOCIAL STABILITY: SOCIAL INSECURITY: ARE THERE ANY APPARENT SIGNS OF INJURIES/BEHAVIORS THAT COULD BE RELATED TO ABUSE/NEGLECT?: NO

## 2024-07-11 SDOH — SOCIAL STABILITY: SOCIAL INSECURITY: ARE YOU OR HAVE YOU BEEN THREATENED OR ABUSED PHYSICALLY, EMOTIONALLY, OR SEXUALLY BY ANYONE?: NO

## 2024-07-11 SDOH — SOCIAL STABILITY: SOCIAL INSECURITY: DO YOU FEEL UNSAFE GOING BACK TO THE PLACE WHERE YOU ARE LIVING?: NO

## 2024-07-11 ASSESSMENT — ACTIVITIES OF DAILY LIVING (ADL)
BATHING: INDEPENDENT
JUDGMENT_ADEQUATE_SAFELY_COMPLETE_DAILY_ACTIVITIES: YES
ASSISTIVE_DEVICE: CANE;WALKER
GROOMING: INDEPENDENT
HEARING - RIGHT EAR: HEARING AID
DRESSING YOURSELF: INDEPENDENT
FEEDING YOURSELF: INDEPENDENT
TOILETING: INDEPENDENT
ADEQUATE_TO_COMPLETE_ADL: YES
HEARING - LEFT EAR: HEARING AID
WALKS IN HOME: INDEPENDENT
LACK_OF_TRANSPORTATION: PATIENT DECLINED
PATIENT'S MEMORY ADEQUATE TO SAFELY COMPLETE DAILY ACTIVITIES?: YES

## 2024-07-11 ASSESSMENT — COGNITIVE AND FUNCTIONAL STATUS - GENERAL
MOBILITY SCORE: 19
HELP NEEDED FOR BATHING: A LITTLE
STANDING UP FROM CHAIR USING ARMS: A LITTLE
STANDING UP FROM CHAIR USING ARMS: A LITTLE
CLIMB 3 TO 5 STEPS WITH RAILING: A LITTLE
WALKING IN HOSPITAL ROOM: A LITTLE
WALKING IN HOSPITAL ROOM: A LITTLE
DAILY ACTIVITIY SCORE: 20
TOILETING: A LITTLE
PATIENT BASELINE BEDBOUND: NO
CLIMB 3 TO 5 STEPS WITH RAILING: A LOT
DRESSING REGULAR LOWER BODY CLOTHING: A LITTLE
MOBILITY SCORE: 20
MOVING TO AND FROM BED TO CHAIR: A LITTLE
MOVING TO AND FROM BED TO CHAIR: A LITTLE
DRESSING REGULAR LOWER BODY CLOTHING: A LOT
DAILY ACTIVITIY SCORE: 22
TOILETING: A LITTLE

## 2024-07-11 ASSESSMENT — PAIN SCALES - GENERAL
PAINLEVEL_OUTOF10: 0 - NO PAIN
PAINLEVEL_OUTOF10: 8
PAINLEVEL_OUTOF10: 5 - MODERATE PAIN
PAINLEVEL_OUTOF10: 8
PAINLEVEL_OUTOF10: 5 - MODERATE PAIN
PAINLEVEL_OUTOF10: 4
PAINLEVEL_OUTOF10: 5 - MODERATE PAIN
PAINLEVEL_OUTOF10: 0 - NO PAIN
PAINLEVEL_OUTOF10: 5 - MODERATE PAIN
PAINLEVEL_OUTOF10: 8
PAINLEVEL_OUTOF10: 10 - WORST POSSIBLE PAIN
PAINLEVEL_OUTOF10: 8
PAINLEVEL_OUTOF10: 5 - MODERATE PAIN
PAINLEVEL_OUTOF10: 8
PAINLEVEL_OUTOF10: 8
PAINLEVEL_OUTOF10: 0 - NO PAIN
PAINLEVEL_OUTOF10: 8
PAINLEVEL_OUTOF10: 8

## 2024-07-11 ASSESSMENT — PAIN DESCRIPTION - DESCRIPTORS
DESCRIPTORS: THROBBING;SORE
DESCRIPTORS: ACHING;SORE
DESCRIPTORS: ACHING;SORE
DESCRIPTORS: THROBBING
DESCRIPTORS: THROBBING;ACHING
DESCRIPTORS: THROBBING
DESCRIPTORS: SORE;THROBBING
DESCRIPTORS: THROBBING;SORE
DESCRIPTORS: THROBBING
DESCRIPTORS: THROBBING;ACHING

## 2024-07-11 ASSESSMENT — COLUMBIA-SUICIDE SEVERITY RATING SCALE - C-SSRS
2. HAVE YOU ACTUALLY HAD ANY THOUGHTS OF KILLING YOURSELF?: NO
1. IN THE PAST MONTH, HAVE YOU WISHED YOU WERE DEAD OR WISHED YOU COULD GO TO SLEEP AND NOT WAKE UP?: NO
6. HAVE YOU EVER DONE ANYTHING, STARTED TO DO ANYTHING, OR PREPARED TO DO ANYTHING TO END YOUR LIFE?: NO
2. HAVE YOU ACTUALLY HAD ANY THOUGHTS OF KILLING YOURSELF?: NO
6. HAVE YOU EVER DONE ANYTHING, STARTED TO DO ANYTHING, OR PREPARED TO DO ANYTHING TO END YOUR LIFE?: NO
1. IN THE PAST MONTH, HAVE YOU WISHED YOU WERE DEAD OR WISHED YOU COULD GO TO SLEEP AND NOT WAKE UP?: NO

## 2024-07-11 ASSESSMENT — PAIN - FUNCTIONAL ASSESSMENT
PAIN_FUNCTIONAL_ASSESSMENT: 0-10
PAIN_FUNCTIONAL_ASSESSMENT: UNABLE TO SELF-REPORT
PAIN_FUNCTIONAL_ASSESSMENT: 0-10

## 2024-07-11 ASSESSMENT — PAIN DESCRIPTION - LOCATION: LOCATION: HIP

## 2024-07-11 ASSESSMENT — LIFESTYLE VARIABLES
HOW OFTEN DO YOU HAVE 6 OR MORE DRINKS ON ONE OCCASION: NEVER
AUDIT-C TOTAL SCORE: 0
HOW OFTEN DO YOU HAVE A DRINK CONTAINING ALCOHOL: NEVER
SKIP TO QUESTIONS 9-10: 1
HOW MANY STANDARD DRINKS CONTAINING ALCOHOL DO YOU HAVE ON A TYPICAL DAY: PATIENT DOES NOT DRINK
AUDIT-C TOTAL SCORE: 0

## 2024-07-11 ASSESSMENT — PAIN DESCRIPTION - ORIENTATION: ORIENTATION: RIGHT

## 2024-07-11 NOTE — CARE PLAN
The patient's goals for the shift include      The clinical goals for the shift include Patient will remain comfortable throughout the shift.    Over the shift, the patient did not make progress toward the following goals. Barriers to progression include right total hip arthroplasty. Recommendations to address these barriers include pain meds as ordered and early ambulation.

## 2024-07-11 NOTE — ANESTHESIA PROCEDURE NOTES
Spinal Block    Patient location during procedure: OR  Start time: 7/11/2024 10:58 AM  End time: 7/11/2024 11:05 AM  Reason for block: primary anesthetic  Staffing  Performed: [facility]   Authorized by: Rafael Mai MD    Performed by: STEFF Jeter    Preanesthetic Checklist  Completed: patient identified, IV checked, risks and benefits discussed, surgical consent, pre-op evaluation, timeout performed and sterile techniques followed  Block Timeout  RN/Licensed healthcare professional reads aloud to the Anesthesia provider and entire team: Patient identity, procedure with side and site, patient position, and as applicable the availability of implants/special equipment/special requirements.  Patient on coagulant treatment: no  Timeout performed at:   Spinal Block  Patient position: sitting  Prep: Betadine  Sterility prep: cap, drape, gloves and mask  Sedation level: light sedation  Patient monitoring: blood pressure and continuous pulse oximetry  Approach: midline  Vertebral space: L3-4  Injection technique: single-shot  Needle  Needle type: pencil-point   Needle gauge: 24 G  Needle length: 4 in  Free flowing CSF: yes    Assessment  Sensory level: T10  Block outcome: patient comfortable  Procedure assessment: patient sedated but conversant throughout procedure and patient tolerated procedure well with no immediate complications  Additional Notes  4cc of 1.5% mepivacaine. Pt tolerated the procedure well.

## 2024-07-11 NOTE — ANESTHESIA PREPROCEDURE EVALUATION
Patient: Rafael Walter    Procedure Information       Date/Time: 07/11/24 1117    Procedure: Right Hip Total Arthroplasty ~ Anterior Approach (Right: Hip) - SIFI Block    Location: [facility] OR 11 / [facility] OR    Surgeons: Fanta Christianson MD          72 yo M hx CLL in remission, Gout, claustrophobia/anxiety (laying flat can be a problem), HTN, chronic thrombocytopenia (130s).  Had some delirium during AJC 3/2023.  Discussed with surgeon.  Will eliminate Versed this time     Relevant Problems   Anesthesia (within normal limits)      Cardiac   (+) Benign essential hypertension   (+) Hyperlipidemia, unspecified   (+) Hypertension secondary to other renal disorders      Pulmonary   (+) Lung nodule, multiple      Neuro   (+) Panic attacks   (+) Panic disorder without agoraphobia      GI   (+) Chronic diarrhea      /Renal   (+) Benign enlargement of prostate      Hematology   (+) Chronic lymphocytic leukemia (Multi)      Musculoskeletal   (+) Osteoarthritis of hip   (+) Primary osteoarthritis of right hip   (+) Unilateral primary osteoarthritis, left hip   (+) Unilateral primary osteoarthritis, right hip       Clinical information reviewed:   Tobacco  Allergies  Meds   Med Hx  Surg Hx   Fam Hx  Soc Hx        NPO Detail:  NPO/Void Status  Carbohydrate Drink Given Prior to Surgery? : N  Date of Last Liquid: 07/11/24  Time of Last Liquid: 0630  Date of Last Solid: 07/10/24  Time of Last Solid: 2000  Last Intake Type: Clear fluids  Time of Last Void: 0900         Physical Exam    Airway  Mallampati: II  TM distance: >3 FB  Neck ROM: full     Cardiovascular   Rate: normal     Dental   (+) upper dentures, lower dentures     Pulmonary - normal exam     Abdominal            Anesthesia Plan    History of general anesthesia?: yes  History of complications of general anesthesia?: no    ASA 3     spinal and MAC     intravenous induction   Postoperative administration of opioids is intended.  Anesthetic plan and risks  discussed with patient.    Plan discussed with resident and attending.

## 2024-07-11 NOTE — OP NOTE
Op Note    Preoperative Diagnosis: Unilateral primary osteoarthritis, right hip [M16.11]  Osteoarthritis of hip [M16.9]     Postoperative Diagnosis: Same    PROCEDURE:   Procedure(s):  Right Hip Total Arthroplasty ~ Anterior Approach     Surgeon:  Fanta Christianson MD     First Assist:  SA Kalen Crandall, PGY-2     Anesthesia Staff:  Anesthesiologist: Rafael Mai MD  C-AA: STEFF Jeter; STEFF Raymundo    Anesthesia Type: Spinal, periarticular injection    Specimens:  No specimens collected    Estimated Blood Loss:  100 ml     Implants:  Implant Name Type Inv. Item Serial No.  Lot No. LRB No. Used Action   56MM EMPHASYS ACETABULAR SHELL, 3-HOLE, CEMENTLESS     [id number] Right 1 Implanted   SCREW CANCELLOUS 6.5 X 25 - SN/A - [id number] Screw SCREW CANCELLOUS 6.5 X 25 N/A DEPUY [id number] Right 1 Implanted   56MM X 40MM EMPHASYS POLYETHYLENE LINER, AOX, NEUTRAL     [id number] Right 1 Implanted   STEM, ACTIS COLLAR, HIGH, SIZE 8 - [id number] Joint Hip STEM, ACTIS COLLAR, HIGH, SIZE 8  DEPUY [id number] Right 1 Implanted   HEAD, FEMORAL, DELTA TS CERAMIC 12/14, 40MM +5.0 - SN/A - [id number] Joint HEAD, FEMORAL, DELTA TS CERAMIC 12/14, 40MM +5.0 N/A DEPUY [id number] Right 1 Implanted        Findings: End-stage osteoarthritis, large cam deformity, femoral acetabular impingement    Clinical History:  73 y.o. male  with hip degenerative joint disease who failed conservative measures and treatment, and wished to undergo a total hip arthroplasty electively. The patient at this time was explained the risks and benefits of the surgery including infection, bleeding, damage to neurovascular structures and potential for continued pain, continued weakness, worsening pain and worsening weakness, stiffness, stability, limb length discrepancy, fracture, infection requiring the resection arthroplasty and the need for revision surgery in the future, DVT prophylaxis and the risks for anesthetic complications and  pulmonary complications and even death. The patient understood at this point under the risks and wished to proceed.    Description of procedure:  The patient was seen in the preoperative holding area and the appropriate side lower extremity was marked. The patient was brought to the Operating Room and placed supine on the Operating Room table. A preliminary anesthesia time out was then taken.    The neuraxial block anesthesia was initiated. The patient was placed supine. The antibiotics were administered within thirty minutes of incision time. The patient was then placed on the HANA table with the bilateral lower extremities in traction boots and the body stabilized against the perineal post.  At this point, the patient´s surgical area was prepped and draped in the standard, sterile fashion.    A surgical time out was performed to confirm the correct side, procedure, and name. The patient received IV antibiotics and  IV TXA. An approximate 8 cm longitudinal incision was made starting superolateral and distal to anterior, superior iliac spine. With a slight proximal medial to distal lateral angulation, the incision was carried down through the subcutaneous tissues down to the level of the tensor fascia misti. With the La retractors in place, the tensor fascia misti was divided. Blunt dissection out of the fascia was done to identify the interval between the tensor fascia misti and the sartorius. An aufranc was placed proximally on the ilium. Once this was done with deep La retractors, blunt dissection was used to identify the ascending branch of the lateral femoral circumflex artery and this was cauterized.    Next, the fat pad between the gluteus medius and the rectus was identified and dissected to the deep interval. At this point, a Dickens was used to elevate the iliocapsularis off the anterior capsule and an Aufranc retractor was placed along the medial neck. The reflected head of the rectus femoris was  identified and preserved. At this point, a capsulotomy was performed along the axis of iliocapsularis and then along the superior border of lateralis and then proximally along the intertrochanteric ridge to the saddle of the femoral neck. The capsule was tagged with two # 2 ethibond sutures. The Aufranc retractors were now placed intracapsular and the head-neck junction was exposed.  Under direct visualization, a subcapital osteotomy was performed with a sagittal saw and the head was removed with a corkscrew.  The capsule was then released posterosuperiorly being careful to identify the interval between minimus and capsule and releasing around the superior capsular insertion from the femur.     The acetabulum was now visualized with the placement of retractors: Aufranc retractor over the posterior acetabulum, a C-retractor over anterior acetabulum. An episiotomy was made in the inferior capsule and straight Homann placed inferior, posterior acetabulum. At this point, the bovie was used to excise the labrum and the cotyloid fossa.     At this point, with a reamer, the acetabulum was medialized and then deepened sequentially up to the final size reamer. Fluoroscopy was used to assess the position of the reamer. Next, the final 56 mm acetabular component was impacted into place. This was done with the assistance of fluoroscopic guidance and position was confirmed anatomically. A 25 mm screw was placed for supplemental fixation.  Once this was accomplished, the final neutral 40 mm polyethylene liner was impacted into place. The osteophytes were removed using an osteotome.     Next, the attention was turned to the femur. The acetabular retractors were removed. A HANA hook was placed around the proximal femur at the level of the G max insertion and lesser trochanter. The curved pointed Hohmann was placed over the greater trochanter laterally and a femoral retractor placed at the inferomedial neck. The conjoint tendon was  visualized and released with the bovie cautery to expose the inner aspect of the greater trochanter. The piriformis and obturator externus tendons were preserved. The leg was then hyperextended and adducted slightly.  With this done, the midas roxanne followed by canal finder was used to access in the intramedullary canal. This was followed by sequential broaching up to the final size consistent with the preoperative template. With the broach left in place and after calcar planning, the neck and trials were placed. The hip was reduced and found to be stable. Leg length, offset and implant size and position were assessed clinically and radiographically, and these were found to be consistent with the preoperative template.     Afterwards, the hip was dislocated. The trial components were removed, and the final size stem was placed in the canal and impacted into place and was found to be stable. The actual 40+5 mm head was now impacted into place after cleaning and drying the trunnion. The hip was reduced with gentle traction and internal rotation. Final leg length was measured on X-ray.    At this point, the hip was copiously irrigated. A periarticular injection was performed. Then the tagging ethibond sutures were tied together to reapproximate the capsulotomy and a few #2 ethibonds were used to complete closure of the capsulotomy. Copious irrigation was used throughout the closure.  The fascia overlying the TFL was now closed using 0 vicryl sutures. The remainder of the wound was closed in layers using 0-vicryl, 3-0 vicryl and skin closed using 3-0 stratafix and dermabond for skin. Sterile mepilex dressings were applied and the patient was transferred from the operating room table to the bed and then transferred to the Recovery Room in stable condition. All sponge, needle, and instrument counts were correct at the end of the procedure. There were no complications. A surgical debrief was performed at the end of the  case.    Post-operative Plan:  Patient will be placed on anterior hip precautions and will mobilize with physical therapy. They will be started on initial 3 postoperative doses aspirin followed by home Eliquis for DVT prophylaxis along with mechanical compression devices. They will be discharged from the hospital when they have reached rehabilitation goals and their pain is controlled.    Attestation Statement:  I was present for and performed all critical portions of the procedure.    Fanta Christianson MD

## 2024-07-11 NOTE — ANESTHESIA POSTPROCEDURE EVALUATION
Patient: Rafael Walter    Procedure Summary       Date: 07/11/24 Room / Location: [facility] A OR 11 / Virtual U A OR    Anesthesia Start: 1050 Anesthesia Stop: 1332    Procedure: Right Hip Total Arthroplasty ~ Anterior Approach (Right: Hip) Diagnosis:       Unilateral primary osteoarthritis, right hip      (Unilateral primary osteoarthritis, right hip [M16.11])    Surgeons: Fanta Christianson MD Responsible Provider: Rafael Mai MD    Anesthesia Type: spinal, MAC ASA Status: 3            Anesthesia Type: spinal, MAC    Vitals Value Taken Time   /68 07/11/24 1331   Temp 36.3 °C (97.3 °F) 07/11/24 1322   Pulse 64 07/11/24 1336   Resp 18 07/11/24 1336   SpO2 99 % 07/11/24 1336   Vitals shown include unfiled device data.    Anesthesia Post Evaluation    Patient participation: complete - patient participated  Level of consciousness: awake  Pain management: adequate  Airway patency: patent  Cardiovascular status: acceptable and hemodynamically stable  Respiratory status: acceptable  Hydration status: acceptable  Postoperative Nausea and Vomiting: none        No notable events documented.

## 2024-07-11 NOTE — PROGRESS NOTES
"Orthopaedic Surgery Progress Note    Subjective: Evaluated postoperatively in PACU. Still a bit drowsy from the anesthesia but able to follow commands. Pain controlled on current regimen. Denies chest pain, shortness of breath, or fevers.    Objective:  /80   Pulse 69   Temp 36 °C (96.8 °F) (Temporal)   Resp 16   Ht 1.905 m (6' 3\")   Wt 82.6 kg (182 lb 1.6 oz)   SpO2 99%   BMI 22.76 kg/m²     Gen: arousable, NAD, appropriately conversational  Cardiac: RRR to peripheral palpation  Resp: nonlabored on RA  GI: soft, nondistended    MSK:  RLE:   - Post-operative dressings/splint c/d/i   - Motor intact in DF/PF/EHL/FHL  - SILT in saph/sural/SPN/DPN distributions  - Foot wwp, 2+ DP/PT pulse, brisk cap refill  - Compartments soft and compressible, no pain with passive dorsiflexion      Assessment/Plan: 73 y.o. male s/p R DA JAC on 7/11/2024 with Dr. Christianson.      Plan:  - Weight bearing: WBAT, anterior hip precautions  - DVT ppx: SCDs, ASA 81mg BID for 3 doses to start tonight and then eliquis 2.5mg BID starting POD2  - Diet: advance to regular diet as tolerated  - Pain: Tylenol, toradol, oxycodone 5/10, dilaudid for breakthrough  - Antibiotics: perioperative ancef 2g q8hr x3 doses and then duricef x7d  - FEN: HLIV with good PO intake  - Bowel Regimen: Colace, miralax, dulcolax  - PT/OT consulted  - Pulm: Encourage IS  - Continue home medications  - Glycemic: no issues   - Gallo: none   - Drain: none   - PACU XR pelvis  - 2nd dose of TXA already given     Dispo: pending PT and pain control    Kalen Vargas MD  Orthopaedic Surgery, PGY-2  Epic Chat preferred    This patient will be followed by the orthopaedic on-call JAMIE (please refer to Qgenda)     "

## 2024-07-11 NOTE — PERIOPERATIVE NURSING NOTE
1322 Pt arrived to pacu bay at this time, report received from OR and anesthesia staff. Phase 1 care started. Pt sedated without s/sx of distress.   1330 pt responsive to voice, reporting pain. To be medicated per order. OR radiology called.   1332 Message sent to family via text at this time. OR radiology bedside.  1345 pt reportin nausea, medicated per order for nausea.   1349 Dr. Knowles aware of BP and diaphoresis, stating for 500 ml bolus and that he will come to bedside.   1355 Dr. Knowles bedside, admin of 100 mcg phenylephrine. Verbal order for admin of toradol and tramadol to due pt sensitivity to narcotics.   1400 pt restless in bed, tolerating sips of water. Pt aware of awaiting orders from Dr. Knowles   1415 pt remains restless in bed, Dr. Knowles messaged and made aware of pt status. Pt medicated per order with IVP 15mg toradol.   1420 spoke with PT Zahra, made aware of pt status.  1423 Message sent to family via text at this time.   1424 pt medicated per order with PO tramadol  1436 Dr. Knowles messaged obtaining order for another 15mg toradol.   1443 pt wife updated via phone call at this time.   1500 pt now reporting dec in pain to a 5, pt less restless and appearing more comfortable.   1510 report to Angelina HOFFMAN

## 2024-07-11 NOTE — PROGRESS NOTES
Orthopedic Postoperative Check     Subjective  Interval History: The patient has mild pain, The patient is awaiting PT, The patient is tolerating a diet, The patient has no nausea or vomiting, and The patient is moving feet and ankles freely. Pain well controlled on current regimen.     Objective    Physical Exam  NAD  Dressing/Incision c/d/i  Firing TA/EHL/GS  SILT L4-S1 grossly  2+ DP  WWP    Vitals/Labs:   Prior Labs:   Lab Results   Component Value Date    WBC 4.3 (L) 07/08/2024    HGB 13.8 07/08/2024    HCT 45.6 07/08/2024    MCV 81 07/08/2024     07/08/2024      Lab Results   Component Value Date    INR 1.4 (H) 10/07/2019    INR 1.3 (H) 10/04/2019    INR 1.2 (H) 10/02/2019    PROTIME 15.8 (H) 10/07/2019    PROTIME 14.3 (H) 10/04/2019    PROTIME 13.5 (H) 10/02/2019         Lab Results   Component Value Date    GLUCOSE 91 07/08/2024    CALCIUM 9.2 07/08/2024     07/08/2024    K 3.9 07/08/2024    CO2 29 07/08/2024     07/08/2024    BUN 14 07/08/2024    CREATININE 0.78 07/08/2024      Lab Results   Component Value Date    CKTOTAL 30 04/21/2023    TROPONINI <0.02 08/02/2019      Lab Results   Component Value Date    HGBA1C 5.2 07/01/2024           Lab Results   Component Value Date    CRP 15.17 (A) 07/29/2019      Lab Results   Component Value Date    SEDRATE 10 01/18/2019         Assessment/Plan  73 y.o. male , s/p Procedure(s):  Right Hip Total Arthroplasty ~ Anterior Approach ,  POD# 0 doing well.   · Continue Physical Therapy and Mobilize  · Weightbearing as tolerated  and Anterior hip precautions  · Follow-Up Labs  · DVT Prophylaxis:  Aspirin 81 mg BID starting POD 0, continue for 6 weeks, Sequential compression devices, and BILLY hose   · Antibiotics: Perioperative cefazolin 2 doses (or until discharge). 7-day course of oral Cefadroxil 500 mg BID    · Analgesia: wean to orals. Judicious use of opioids.   · Ice to surgical site every 4 hours   · Advance Diet  · IS, Bowel regimen  · Dispo  Planning  · Outpatient Follow-Up: Dr. Christianson clinic at 2 weeks postoperatively     Fanta Christianson MD   Adult Reconstruction and Joint Replacement

## 2024-07-12 ENCOUNTER — APPOINTMENT (OUTPATIENT)
Dept: ORTHOPEDIC SURGERY | Facility: CLINIC | Age: 74
End: 2024-07-12
Payer: MEDICARE

## 2024-07-12 ENCOUNTER — PHARMACY VISIT (OUTPATIENT)
Dept: PHARMACY | Facility: CLINIC | Age: 74
End: 2024-07-12
Payer: COMMERCIAL

## 2024-07-12 ENCOUNTER — DOCUMENTATION (OUTPATIENT)
Dept: HOME HEALTH SERVICES | Facility: HOME HEALTH | Age: 74
End: 2024-07-12
Payer: MEDICARE

## 2024-07-12 VITALS
DIASTOLIC BLOOD PRESSURE: 72 MMHG | RESPIRATION RATE: 16 BRPM | HEIGHT: 75 IN | HEART RATE: 75 BPM | BODY MASS INDEX: 22.64 KG/M2 | WEIGHT: 182.1 LBS | TEMPERATURE: 98.7 F | OXYGEN SATURATION: 100 % | SYSTOLIC BLOOD PRESSURE: 168 MMHG

## 2024-07-12 LAB
ANION GAP SERPL CALC-SCNC: 11 MMOL/L (ref 10–20)
BUN SERPL-MCNC: 12 MG/DL (ref 6–23)
CALCIUM SERPL-MCNC: 7.9 MG/DL (ref 8.6–10.3)
CHLORIDE SERPL-SCNC: 104 MMOL/L (ref 98–107)
CO2 SERPL-SCNC: 24 MMOL/L (ref 21–32)
CREAT SERPL-MCNC: 0.67 MG/DL (ref 0.5–1.3)
EGFRCR SERPLBLD CKD-EPI 2021: >90 ML/MIN/1.73M*2
ERYTHROCYTE [DISTWIDTH] IN BLOOD BY AUTOMATED COUNT: 15.1 % (ref 11.5–14.5)
GLUCOSE SERPL-MCNC: 122 MG/DL (ref 74–99)
HCT VFR BLD AUTO: 37.3 % (ref 41–52)
HGB BLD-MCNC: 12 G/DL (ref 13.5–17.5)
MCH RBC QN AUTO: 25.2 PG (ref 26–34)
MCHC RBC AUTO-ENTMCNC: 32.2 G/DL (ref 32–36)
MCV RBC AUTO: 78 FL (ref 80–100)
NRBC BLD-RTO: 0 /100 WBCS (ref 0–0)
PLATELET # BLD AUTO: 159 X10*3/UL (ref 150–450)
POTASSIUM SERPL-SCNC: 3.7 MMOL/L (ref 3.5–5.3)
RBC # BLD AUTO: 4.76 X10*6/UL (ref 4.5–5.9)
SODIUM SERPL-SCNC: 135 MMOL/L (ref 136–145)
WBC # BLD AUTO: 6.3 X10*3/UL (ref 4.4–11.3)

## 2024-07-12 PROCEDURE — 97110 THERAPEUTIC EXERCISES: CPT | Mod: GP

## 2024-07-12 PROCEDURE — 36415 COLL VENOUS BLD VENIPUNCTURE: CPT

## 2024-07-12 PROCEDURE — 7100000011 HC EXTENDED STAY RECOVERY HOURLY - NURSING UNIT

## 2024-07-12 PROCEDURE — 80048 BASIC METABOLIC PNL TOTAL CA: CPT

## 2024-07-12 PROCEDURE — 9420000001 HC RT PATIENT EDUCATION 5 MIN

## 2024-07-12 PROCEDURE — 2500000004 HC RX 250 GENERAL PHARMACY W/ HCPCS (ALT 636 FOR OP/ED): Mod: JZ

## 2024-07-12 PROCEDURE — 2500000001 HC RX 250 WO HCPCS SELF ADMINISTERED DRUGS (ALT 637 FOR MEDICARE OP)

## 2024-07-12 PROCEDURE — 97161 PT EVAL LOW COMPLEX 20 MIN: CPT | Mod: GP

## 2024-07-12 PROCEDURE — 99238 HOSP IP/OBS DSCHRG MGMT 30/<: CPT

## 2024-07-12 PROCEDURE — 85027 COMPLETE CBC AUTOMATED: CPT

## 2024-07-12 PROCEDURE — 2500000001 HC RX 250 WO HCPCS SELF ADMINISTERED DRUGS (ALT 637 FOR MEDICARE OP): Performed by: STUDENT IN AN ORGANIZED HEALTH CARE EDUCATION/TRAINING PROGRAM

## 2024-07-12 PROCEDURE — 97116 GAIT TRAINING THERAPY: CPT | Mod: GP

## 2024-07-12 RX ORDER — IBUPROFEN 600 MG/1
600 TABLET ORAL
Status: DISCONTINUED | OUTPATIENT
Start: 2024-07-12 | End: 2024-07-12 | Stop reason: HOSPADM

## 2024-07-12 SDOH — ECONOMIC STABILITY: INCOME INSECURITY: HOW HARD IS IT FOR YOU TO PAY FOR THE VERY BASICS LIKE FOOD, HOUSING, MEDICAL CARE, AND HEATING?: NOT HARD AT ALL

## 2024-07-12 SDOH — ECONOMIC STABILITY: FOOD INSECURITY: WITHIN THE PAST 12 MONTHS, THE FOOD YOU BOUGHT JUST DIDN'T LAST AND YOU DIDN'T HAVE MONEY TO GET MORE.: NEVER TRUE

## 2024-07-12 SDOH — SOCIAL STABILITY: SOCIAL NETWORK: ARE YOU MARRIED, WIDOWED, DIVORCED, SEPARATED, NEVER MARRIED, OR LIVING WITH A PARTNER?: MARRIED

## 2024-07-12 SDOH — ECONOMIC STABILITY: TRANSPORTATION INSECURITY
IN THE PAST 12 MONTHS, HAS THE LACK OF TRANSPORTATION KEPT YOU FROM MEDICAL APPOINTMENTS OR FROM GETTING MEDICATIONS?: NO

## 2024-07-12 SDOH — HEALTH STABILITY: MENTAL HEALTH: HOW OFTEN DO YOU HAVE 6 OR MORE DRINKS ON ONE OCCASION?: NEVER

## 2024-07-12 SDOH — ECONOMIC STABILITY: INCOME INSECURITY: IN THE PAST 12 MONTHS, HAS THE ELECTRIC, GAS, OIL, OR WATER COMPANY THREATENED TO SHUT OFF SERVICE IN YOUR HOME?: NO

## 2024-07-12 SDOH — HEALTH STABILITY: MENTAL HEALTH: HOW OFTEN DO YOU HAVE A DRINK CONTAINING ALCOHOL?: NEVER

## 2024-07-12 SDOH — ECONOMIC STABILITY: HOUSING INSECURITY: AT ANY TIME IN THE PAST 12 MONTHS, WERE YOU HOMELESS OR LIVING IN A SHELTER (INCLUDING NOW)?: NO

## 2024-07-12 SDOH — ECONOMIC STABILITY: FOOD INSECURITY: WITHIN THE PAST 12 MONTHS, YOU WORRIED THAT YOUR FOOD WOULD RUN OUT BEFORE YOU GOT MONEY TO BUY MORE.: NEVER TRUE

## 2024-07-12 SDOH — HEALTH STABILITY: MENTAL HEALTH: HOW MANY STANDARD DRINKS CONTAINING ALCOHOL DO YOU HAVE ON A TYPICAL DAY?: PATIENT DOES NOT DRINK

## 2024-07-12 SDOH — ECONOMIC STABILITY: HOUSING INSECURITY: IN THE PAST 12 MONTHS, HOW MANY TIMES HAVE YOU MOVED WHERE YOU WERE LIVING?: 0

## 2024-07-12 SDOH — ECONOMIC STABILITY: TRANSPORTATION INSECURITY
IN THE PAST 12 MONTHS, HAS LACK OF TRANSPORTATION KEPT YOU FROM MEETINGS, WORK, OR FROM GETTING THINGS NEEDED FOR DAILY LIVING?: NO

## 2024-07-12 SDOH — ECONOMIC STABILITY: INCOME INSECURITY: IN THE LAST 12 MONTHS, WAS THERE A TIME WHEN YOU WERE NOT ABLE TO PAY THE MORTGAGE OR RENT ON TIME?: NO

## 2024-07-12 ASSESSMENT — COGNITIVE AND FUNCTIONAL STATUS - GENERAL
STANDING UP FROM CHAIR USING ARMS: A LITTLE
MOBILITY SCORE: 20
MOVING TO AND FROM BED TO CHAIR: A LITTLE
TOILETING: A LITTLE
DRESSING REGULAR LOWER BODY CLOTHING: A LITTLE
DAILY ACTIVITIY SCORE: 22
CLIMB 3 TO 5 STEPS WITH RAILING: A LITTLE
WALKING IN HOSPITAL ROOM: A LITTLE

## 2024-07-12 ASSESSMENT — PAIN - FUNCTIONAL ASSESSMENT: PAIN_FUNCTIONAL_ASSESSMENT: 0-10

## 2024-07-12 ASSESSMENT — ACTIVITIES OF DAILY LIVING (ADL)
LACK_OF_TRANSPORTATION: NO
ADL_ASSISTANCE: INDEPENDENT

## 2024-07-12 ASSESSMENT — PAIN SCALES - GENERAL
PAINLEVEL_OUTOF10: 7
PAINLEVEL_OUTOF10: 0 - NO PAIN

## 2024-07-12 ASSESSMENT — PAIN DESCRIPTION - LOCATION: LOCATION: HIP

## 2024-07-12 ASSESSMENT — LIFESTYLE VARIABLES
AUDIT-C TOTAL SCORE: 0
SKIP TO QUESTIONS 9-10: 1

## 2024-07-12 ASSESSMENT — PAIN DESCRIPTION - ORIENTATION: ORIENTATION: RIGHT

## 2024-07-12 NOTE — CARE PLAN
The patient's goals for the shift include      The clinical goals for the shift include Patient pain to be controlled for shift      Problem: Pain - Adult  Goal: Verbalizes/displays adequate comfort level or baseline comfort level  Outcome: Progressing     Problem: Safety - Adult  Goal: Free from fall injury  Outcome: Progressing     Problem: Discharge Planning  Goal: Discharge to home or other facility with appropriate resources  Outcome: Progressing     Problem: Chronic Conditions and Co-morbidities  Goal: Patient's chronic conditions and co-morbidity symptoms are monitored and maintained or improved  Outcome: Progressing

## 2024-07-12 NOTE — CARE PLAN
The patient's goals for the shift include      The clinical goals for the shift include Patient pain to be controlled for shift      Problem: Pain - Adult  Goal: Verbalizes/displays adequate comfort level or baseline comfort level  7/12/2024 0139 by Mike Self LPN  Outcome: Progressing  7/12/2024 0138 by Mike Self LPN  Outcome: Progressing     Problem: Safety - Adult  Goal: Free from fall injury  7/12/2024 0139 by Mike Self LPN  Outcome: Progressing  7/12/2024 0138 by Mike Self LPN  Outcome: Progressing     Problem: Discharge Planning  Goal: Discharge to home or other facility with appropriate resources  7/12/2024 0139 by Mike Self LPN  Outcome: Progressing  7/12/2024 0138 by Mike Self LPN  Outcome: Progressing     Problem: Chronic Conditions and Co-morbidities  Goal: Patient's chronic conditions and co-morbidity symptoms are monitored and maintained or improved  7/12/2024 0139 by Mike Self LPN  Outcome: Progressing  7/12/2024 0138 by Mike Self LPN  Outcome: Progressing

## 2024-07-12 NOTE — HH CARE COORDINATION
Home Care received a Referral for Physical Therapy and ot . We have processed the referral for a Start of Care on 7/13.     If you have any questions or concerns, please feel free to contact us at [phone]. Follow the prompts, enter your five digit zip code, and you will be directed to your care team on WEST 2.

## 2024-07-12 NOTE — PROGRESS NOTES
07/12/24 1019   Discharge Planning   Living Arrangements Spouse/significant other   Support Systems Spouse/significant other   Assistance Needed None   Type of Residence Private residence   Number of Stairs to Enter Residence 3   Number of Stairs Within Residence 0   Do you have animals or pets at home? Yes   Type of Animals or Pets one cat   Who is requesting discharge planning? Provider   Home or Post Acute Services In home services   Type of Home Care Services Home OT;Home PT   Expected Discharge Disposition HH Services   Does the patient need discharge transport arranged? No   Financial Resource Strain   How hard is it for you to pay for the very basics like food, housing, medical care, and heating? Not hard   Housing Stability   In the last 12 months, was there a time when you were not able to pay the mortgage or rent on time? N   In the past 12 months, how many times have you moved where you were living? 1   At any time in the past 12 months, were you homeless or living in a shelter (including now)? N   Transportation Needs   In the past 12 months, has lack of transportation kept you from medical appointments or from getting medications? no   In the past 12 months, has lack of transportation kept you from meetings, work, or from getting things needed for daily living? No   Patient Choice   Provider Choice list and CMS website (https://medicare.gov/care-compare#search) for post-acute Quality and Resource Measure Data were provided and reviewed with: Patient   Patient / Family choosing to utilize agency / facility established prior to hospitalization No     7/12/24 1019  Met with patient at bedside to discuss discharge planning.  Patient lives at home with his wife in a house.  He is independent with ADLs and drives at baseline.  He does not use any assistive devices for ambulation. He has a walker for discharge.  He plans on returning home at discharge today. He is agreeable to [facility].  Reviewed [facility] options and   expectations with patient.  He would like to use [facility].  Waiting on confirmation of SOC from [facility].  Also updated [facility] that patient is requesting therapist Shira.   Pam Ornelas RN TCC    7/12/24 1028  SOC confirmed with [facility] (MICHELLE Edmonds) for tomorrow.  They noted patient's request for therapist Shira.  Pam Ornelas RN TCC

## 2024-07-12 NOTE — NURSING NOTE

## 2024-07-12 NOTE — DISCHARGE SUMMARY
Discharge Diagnosis  Unilateral primary osteoarthritis, right hip    Issues Requiring Follow-Up  Regular post-op visits    Test Results Pending At Discharge  Pending Labs       No current pending labs.            Hospital Course   73 yr old male with osteoarthritis s/p right hip total arthroplasty on 7/11 with Dr. Christianson. Please see operative report for full details. Patient tolerated the procedure well and recovered briefly in PACU before being transitioned to regular nursing floor. Post-op course was uncomplicated. Diet was advanced as tolerated.  IV medication transitioned to oral as diet advanced. On the day of discharge, the pt was tolerating a diet, pain was controlled on PO pain medication, and they were ambulating and voiding spontaneously. Pt discharged home on 7/12 in stable condition with instructions to follow up as outpatient.       Pertinent Physical Exam At Time of Discharge  Physical Exam  Vitals reviewed.   Constitutional:       Appearance: Normal appearance.   Cardiovascular:      Rate and Rhythm: Normal rate and regular rhythm.      Pulses: Normal pulses.      Heart sounds: Normal heart sounds.   Pulmonary:      Effort: Pulmonary effort is normal.      Breath sounds: Normal breath sounds.   Abdominal:      General: Bowel sounds are normal.      Palpations: Abdomen is soft.   Musculoskeletal:      Cervical back: Normal range of motion.      Comments: Dressing on right hip C/D/I with bandage in place. Minor bruising around dressing.    Skin:     General: Skin is warm and dry.   Neurological:      Mental Status: He is alert and oriented to person, place, and time.   Psychiatric:         Behavior: Behavior normal.         Home Medications     Medication List      START taking these medications     acetaminophen 500 mg tablet; Commonly known as: Tylenol; Take 2 tablets   (1,000 mg) by mouth every 8 hours.; Replaces: acetaminophen 650 mg ER   tablet   aspirin 81 mg chewable tablet; Chew 1 tablet (81  mg) 2 times a day for 3   doses. Start postoperative day 0 and continue for 3 doses   cefadroxil 500 mg capsule; Commonly known as: Duricef; Take 1 capsule   (500 mg) by mouth 2 times a day for 7 days.   docusate sodium 100 mg capsule; Commonly known as: Colace; Take 1   capsule (100 mg) by mouth 2 times a day for 15 days.   meloxicam 15 mg tablet; Commonly known as: Mobic; Take 1 tablet (15 mg)   by mouth once daily.   ondansetron 4 mg tablet; Commonly known as: Zofran; Take 1 tablet (4 mg)   by mouth every 8 hours if needed for nausea or vomiting.   sennosides 8.6 mg tablet; Commonly known as: Senokot; Take 1 tablet (8.6   mg) by mouth once daily for 15 days.   traMADol 50 mg tablet; Commonly known as: Ultram; Take 1-2 tablets   ( mg) by mouth every 6 hours if needed for severe pain (7 - 10) for   up to 7 days.     CHANGE how you take these medications     pantoprazole 40 mg EC tablet; Commonly known as: ProtoNix; Take 1 tablet   (40 mg) by mouth once daily in the morning before meals. Do not crush,   chew, or split.; What changed: additional instructions     CONTINUE taking these medications     ALPRAZolam 0.5 mg tablet; Commonly known as: Xanax   apixaban 2.5 mg tablet; Commonly known as: Eliquis; Take 1 tablet (2.5   mg) by mouth 2 times a day. Start morning of postoperative day 2 and   continue for 5 weeks   chlorhexidine 0.12 % solution; Commonly known as: Peridex; Use 15 mL in   the mouth or throat 2 times a day. Use night before surgery and morning of   surgery Swish and spit   diphenhydrAMINE 50 mg tablet; Commonly known as: BENADryl   losartan 50 mg tablet; Commonly known as: Cozaar; TAKE 1 TABLET BY MOUTH   EVERY DAY   sildenafil 50 mg tablet; Commonly known as: Viagra; Take 1 tablet (50   mg) by mouth once daily as needed for erectile dysfunction. 1 HOUR BEFORE   SEXUAL ACTIVITY   Vitamin D3 25 MCG (1000 UT) capsule; Generic drug: cholecalciferol     STOP taking these medications     acetaminophen  650 mg ER tablet; Commonly known as: Tylenol 8 HOUR;   Replaced by: acetaminophen 500 mg tablet       Outpatient Follow-Up  Future Appointments   Date Time Provider Department Center   7/26/2024 11:20 AM Fanta Christianson MD [id number] [place]   9/11/2024  9:30 AM Milo Gold DO [id number] [place]   10/24/2024  9:20 AM Cecilio Luciano MD PhD [id number] [facility]       CAPRI Fox-CNP

## 2024-07-12 NOTE — NURSING NOTE
Interdisciplinary team present: NP, PT, NM, CC, SW, Orthopedic Coordinator, and bedside RN.  Pain - controlled  Nausea - none  Discharge barrier - n/a  Discharge plan - Home with [facility]  Discharge date/time - today after PT

## 2024-07-12 NOTE — PROGRESS NOTES
Physical Therapy    Physical Therapy Evaluation & Treatment    Patient Name: Rafael Walter  MRN: [id number]  Today's Date: 7/12/2024   Time Calculation  Start Time: 0835  Stop Time: 0915  Time Calculation (min): 40 min    Assessment/Plan   PT Assessment  PT Assessment Results: Decreased strength, Decreased endurance, Impaired balance, Decreased mobility, Pain, Orthopedic restrictions  Rehab Prognosis: Good  Evaluation/Treatment Tolerance: Patient tolerated treatment well  Medical Staff Made Aware: Yes  Strengths: Ability to acquire knowledge, Housing layout, Physical health, Insight into problems, Rehab experience  Barriers to Participation:  (N/A)  End of Session Communication: Bedside nurse  Assessment Comment: Pt is POD #1  s/p R anterior JAC with total hip precautions (anterior) and WBAT. The pt presents with decreased safety and decreased independence with bed mobility, transfers, and gait/step curb.Contributing to these impairment are post-op pain, decreased R hipROM, decreased RLE strength, decreased balance, and poor stability. The pt would benefit from continued PT to assess progress and further therapy needs, address the above functional limitations and impairments to improve independence and safety and allow for an anticipated d/c with low intensity PT and assist PRN once medically stable at D/C.  End of Session Patient Position: Up in chair, Alarm on   IP OR SWING BED PT PLAN  Inpatient or Swing Bed: Inpatient  PT Plan  Treatment/Interventions: Bed mobility, Transfer training, Gait training, Stair training, Balance training, Neuromuscular re-education, Strengthening, Endurance training, Therapeutic exercise, Therapeutic activity, Home exercise program  PT Plan: Ongoing PT  PT Frequency: BID  PT Discharge Recommendations: Low intensity level of continued care  Equipment Recommended upon Discharge:  (owns FWW)  PT Recommended Transfer Status: Stand by assist  PT - OK to Discharge: Yes (PT POC initiated this  date)      Subjective     General Visit Information:  General  Reason for Referral: Pt is a 74 yo male POD #1 s/p R anterior JAC  Referred By: Sr. Christianson  Past Medical History Relevant to Rehab:   Past Medical History:   Diagnosis Date    Abnormal finding of blood chemistry, unspecified 11/28/2012    Abnormal blood chemistry    Anxiety     BPH (benign prostatic hyperplasia)     Claustrophobia     unable to lay flat    CLL (chronic lymphocytic leukemia) (Multi) 2014    in remission, off oral meds x 2 years, last Hem/Onc visit 12/14/23-stable    Gout     History of splenomegaly     resolved per patient    Hyperlipidemia     Hypertension     Long-term current use of intravenous immunoglobulin (IVIG)     next treatment 3/25/24    Lung nodule     Lupus nephritis (Multi)     Osteoarthritis     Skin cancer 10/19/2014    left upper face    Unilateral primary osteoarthritis, right hip     Vitamin D deficiency        Family/Caregiver Present: No  Prior to Session Communication: Bedside nurse  Patient Position Received: Bed, 3 rail up, Alarm on  General Comment: Pt pleasant and agreeable to PT evaluation  Home Living:  Home Living  Type of Home: House  Lives With: Spouse  Home Adaptive Equipment: Walker rolling or standard, Cane (hip kit)  Home Layout: One level, Laundry in basement (wife does laundry)  Home Access: Stairs to enter without rails  Entrance Stairs-Rails: None  Entrance Stairs-Number of Steps: 1+1 (or just 1 MERCEDES via back)  Bathroom Shower/Tub: Tub/shower unit, Walk-in shower  Bathroom Toilet: Handicapped height  Bathroom Equipment: Grab bars in shower, Built-in shower seat  Prior Level of Function:  Prior Function Per Pt/Caregiver Report  Level of [place]: Independent with ADLs and functional transfers, Independent with homemaking with ambulation  ADL Assistance: Independent  Homemaking Assistance: Independent (split duties with wife)  Ambulatory Assistance: Independent (no device)  Prior Function  Comments: + driving  Precautions:  Precautions  LE Weight Bearing Status: Weight Bearing as Tolerated  Medical Precautions: Fall precautions  Post-Surgical Precautions: Right hip precautions (anterior)      Objective   Pain:  Pain Assessment  Pain Assessment: 0-10  0-10 (Numeric) Pain Score: 0 - No pain (occasional pain in R surgical site with WB and ambulation - not rated. No pain at rest)  Pain Interventions: Cold applied, Repositioned, Ambulation/increased activity  Cognition:  Cognition  Overall Cognitive Status: Within Functional Limits  Orientation Level: Oriented X4  Attention: Within Functional Limits  Memory: Within Funtional Limits  Insight: Within function limits  Impulsive: Within functional limits    General Assessments:  Activity Tolerance  Endurance: Tolerates 30 min exercise with multiple rests    Sensation  Light Touch: No apparent deficits    Coordination  Movements are Fluid and Coordinated: Yes    Static Sitting Balance  Static Sitting-Balance Support: No upper extremity supported  Static Sitting-Level of Assistance: Distant supervision, Modified independent  Static Sitting-Comment/Number of Minutes: 2 min    Static Standing Balance  Static Standing-Balance Support: Bilateral upper extremity supported (FWW)  Static Standing-Level of Assistance: Distant supervision  Static Standing-Comment/Number of Minutes: 2 min  Functional Assessments:  Bed Mobility  Bed Mobility: Yes  Bed Mobility 1  Bed Mobility 1: Supine to sitting, Sitting to supine  Level of Assistance 1: Distant supervision  Bed Mobility Comments 1: Increased time to manage RLE    Transfers  Transfer: Yes  Transfer 1  Transfer From 1: Stand to  Transfer to 1: Sit  Technique 1: Sit to stand, Stand to sit  Transfer Device 1: Walker, Gait belt  Transfer Level of Assistance 1: Distant supervision  Trials/Comments 1: no cues required for sequencing  Transfers 2  Trials/Comments 2: Educated on car transfer sequencing    Ambulation/Gait  "Training  Ambulation/Gait Training Performed: Yes  Ambulation/Gait Training 1  Surface 1: Level tile  Device 1: Rolling walker  Gait Support Devices: Gait belt  Assistance 1: Close supervision, Distant supervision  Quality of Gait 1:  (Initial R step to sequencing progressing to step through pattern with mild R antalgic pattern)  Comments/Distance (ft) 1: 150ft and 160ft  Extremity/Trunk Assessments:  RLE   RLE : Exceptions to WFL (grossly 3-/5 at hip, 4/5 at knee and 5/5 at ankle)  LLE   LLE : Within Functional Limits  Treatments:  Therapeutic Exercise  Therapeutic Exercise Performed: Yes  Therapeutic Exercise Activity 1: Completed x10 reps of R anterior JAC HEP including ankle pumps ,quad sets, glute sets, SAQ, heel slides, hip abd, and SLR and LAQ    Ambulation/Gait Training  Ambulation/Gait Training Performed: Yes  Ambulation/Gait Training 1  Surface 1: Level tile  Device 1: Rolling walker  Gait Support Devices: Gait belt  Assistance 1: Close supervision, Distant supervision  Quality of Gait 1:  (Initial R step to sequencing progressing to step through pattern with mild R antalgic pattern)  Comments/Distance (ft) 1: 150ft and 160ft (2nd trial part of treatment session)  Stairs  Stairs: Yes  Stairs  Rails 1: None (Comment)  Curb Step 1: Yes  Device 1: Wheeled walker  Support Devices 1: Gait belt  Assistance 1: Close supervision  Comment/Number of Steps 1: VCs for sequencing. Completed ascent and descent of x1 8\" step curb  Outcome Measures:       Encounter Problems       Encounter Problems (Active)       Mobility       STG - Patient will ambulate       Start:  07/12/24    Expected End:  07/26/24       >200ft using FWW Mod Ind         STG - Patient will ambulate up and down a curb/step       Start:  07/12/24    Expected End:  07/26/24       Mod Ind using FWW         HEP       Start:  07/12/24    Expected End:  07/26/24       Pt will complete R JAC HEP with ind            PT Transfers       STG - Patient will " perform bed mobility       Start:  07/12/24    Expected End:  07/26/24       Mod Ind         STG - Patient will transfer sit to and from stand       Start:  07/12/24    Expected End:  07/26/24       Mod Ind            Pain - Adult              Education Documentation  Handouts, taught by Parag Quezada, PT at 7/12/2024  9:29 AM.  Learner: Patient  Readiness: Acceptance  Method: Explanation, Demonstration, Handout  Response: Verbalizes Understanding    Precautions, taught by Parag Quezada, PT at 7/12/2024  9:29 AM.  Learner: Patient  Readiness: Acceptance  Method: Explanation, Demonstration, Handout  Response: Verbalizes Understanding    Body Mechanics, taught by Parag Quezada, PT at 7/12/2024  9:29 AM.  Learner: Patient  Readiness: Acceptance  Method: Explanation, Demonstration, Handout  Response: Verbalizes Understanding    Home Exercise Program, taught by Parag Quezada, PT at 7/12/2024  9:29 AM.  Learner: Patient  Readiness: Acceptance  Method: Explanation, Demonstration, Handout  Response: Verbalizes Understanding    Mobility Training, taught by Parag Quezada, PT at 7/12/2024  9:29 AM.  Learner: Patient  Readiness: Acceptance  Method: Explanation, Demonstration, Handout  Response: Verbalizes Understanding    Education Comments  No comments found.

## 2024-07-12 NOTE — NURSING NOTE
Met with Patient at bedside- Patient is s/p Right Anterior Hip Replacement with Dr. Christianson.  Discussion with patient included education on the following topics: TJR Education: Wound Care, Post-Op Activity, Post-Op Precautions, Cold-Therapy, Importance of post-op prescriptions, When to call the Surgeon's Office, Use of MyChart, and When to call 9-1-1.  Patient had a TJR within the last year and was therefore not required to complete additional education prior to surgery.  Patient is able to verbalize understanding of class content/discussion.  Contact information was provided to patient for support and assistance during the post-operative period.

## 2024-07-13 ENCOUNTER — HOME CARE VISIT (OUTPATIENT)
Dept: HOME HEALTH SERVICES | Facility: HOME HEALTH | Age: 74
End: 2024-07-13
Payer: MEDICARE

## 2024-07-13 VITALS — TEMPERATURE: 97.9 F | HEART RATE: 86 BPM | DIASTOLIC BLOOD PRESSURE: 64 MMHG | SYSTOLIC BLOOD PRESSURE: 116 MMHG

## 2024-07-13 PROCEDURE — G0151 HHCP-SERV OF PT,EA 15 MIN: HCPCS | Mod: HHH

## 2024-07-13 PROCEDURE — 169592 NO-PAY CLAIM PROCEDURE

## 2024-07-13 ASSESSMENT — ENCOUNTER SYMPTOMS
PAIN LOCATION - PAIN SEVERITY: 0/10
PAIN LOCATION - PAIN QUALITY: SHARP
PAIN LOCATION: RIGHT HIP
LOWEST PAIN SEVERITY IN PAST 24 HOURS: 0/10
PAIN: 1
MUSCLE WEAKNESS: 1
SUBJECTIVE PAIN PROGRESSION: UNCHANGED
HIGHEST PAIN SEVERITY IN PAST 24 HOURS: 7/10
PERSON REPORTING PAIN: PATIENT
LIMITED RANGE OF MOTION: 1

## 2024-07-13 ASSESSMENT — BALANCE ASSESSMENTS
SITTING BALANCE: 1 - STEADY, SAFE
ARISING SCORE: 1
NUDGED: 0 - BEGINS TO FALL
NUDGED SCORE: 0
IMMEDIATE STANDING BALANCE FIRST 5 SECONDS: 1 - STEADY BUT USES WALKER OR OTHER SUPPORT
EYES CLOSED AT MAXIMUM POSITION NUDGED: 0 - UNSTEADY
TURNING 360 DEGREES STEPS: 0 - DISCONTINUOUS STEPS
BALANCE SCORE: 8
ATTEMPTS TO ARISE: 2 - ABLE TO RISE, ONE ATTEMPT
STANDING BALANCE: 1 - STEADY BUT WIDE STANCE AND USES CANE OR OTHER SUPPORT
ARISES: 1 - ABLE, USES ARMS TO HELP
SITTING DOWN: 1 - USES ARMS OR NOT SMOOTH MOTION

## 2024-07-13 ASSESSMENT — ACTIVITIES OF DAILY LIVING (ADL)
ENTERING_EXITING_HOME: NEEDS ASSISTANCE
OASIS_M1830: 03
AMBULATION ASSISTANCE ON FLAT SURFACES: 1

## 2024-07-13 ASSESSMENT — GAIT ASSESSMENTS
BALANCE AND GAIT SCORE: 13
TRUNK: 0 - MARKED SWAY OR USES WALKING AID
STEP SYMMETRY: 0 - RIGHT AND LEFT STEP LENGTH NOT EQUAL
GAIT SCORE: 5
INITIATION OF GAIT IMMEDIATELY AFTER GO: 0 - ANY HESITANCY OR MULTIPLE ATTEMPTS TO START
TRUNK SCORE: 0
PATH: 1 - MILD/MODERATE DEVIATION OR USES WALKING AID
WALKING STANCE: 1 - HEELS ALMOST TOUCHING WHILE WALKING
STEP CONTINUITY: 1 - STEPS APPEAR CONTINUOUS
PATH SCORE: 1

## 2024-07-15 ENCOUNTER — HOME CARE VISIT (OUTPATIENT)
Dept: HOME HEALTH SERVICES | Facility: HOME HEALTH | Age: 74
End: 2024-07-15
Payer: MEDICARE

## 2024-07-15 VITALS — TEMPERATURE: 96.1 F | SYSTOLIC BLOOD PRESSURE: 120 MMHG | DIASTOLIC BLOOD PRESSURE: 60 MMHG | HEART RATE: 79 BPM

## 2024-07-15 VITALS
OXYGEN SATURATION: 99 % | DIASTOLIC BLOOD PRESSURE: 68 MMHG | RESPIRATION RATE: 18 BRPM | SYSTOLIC BLOOD PRESSURE: 124 MMHG | TEMPERATURE: 97.6 F | HEART RATE: 80 BPM

## 2024-07-15 PROCEDURE — G0152 HHCP-SERV OF OT,EA 15 MIN: HCPCS | Mod: HHH

## 2024-07-15 PROCEDURE — G0151 HHCP-SERV OF PT,EA 15 MIN: HCPCS | Mod: HHH

## 2024-07-15 SDOH — ECONOMIC STABILITY: HOUSING INSECURITY
HOME SAFETY: SYMPTOMS OF INFECTION SURROUNDING SURGICAL AREA, DRESSING IN PLACE. EDUCATED IN DRESSING/INCISION CARE WITH SHOWERING. PATIENT AND SPOUSE VERBALIZE GOOD UNDERSTANDING. NO FURTHER HH OT SERVICES INDICATED AT THIS TIME. PATIENT AND SPOUSE IN AGREEMENT

## 2024-07-15 SDOH — ECONOMIC STABILITY: HOUSING INSECURITY
HOME SAFETY: ED. PATIENT HAS NECESSARY AE FOR LB DRESSING, ENCOURAGED USE OF AE AS PATIENT WANTS TO ENSURE PROPER BODY MECHANICS AND BE SELF SUFFICIENT.   PATIENT EXHIBITING GOOD SAFETY WITH FUNCTIONAL TRANSFERS AND MOBILITY, GOOD RETURN DEMO OF AE.   NO SIGNS OR

## 2024-07-15 SDOH — ECONOMIC STABILITY: HOUSING INSECURITY
HOME SAFETY: 75 YO MALE S/P R ANTERIOR THR. WBAT, ANTERIOR HIP PRECAUTIONS.  PLOF: INDEP, LIVES WITH WIFE IN 1SH  PATIENT IS NOW USING WHEELED WALKER FOR TRANSFERS AND MOBILITY, SHOWERING WITH SUPERVISION STANDING IN THE SHOWER. SPOUSE AVAILABLE TO ASSIST AS NEED

## 2024-07-15 SDOH — ECONOMIC STABILITY: HOUSING INSECURITY: HOME SAFETY: MEDS, ICE

## 2024-07-15 SDOH — HEALTH STABILITY: PHYSICAL HEALTH
EXERCISE COMMENTS: INSTR IN AND PERFORMED: AP, QS, GS, HS, SAQ, HIP ABD  2X10, CUES FOR TECHNIQUE, CUES TO KEEP RLE  NEUTRAL WITH ABD, HS , SHORTER DISTANCE

## 2024-07-15 SDOH — ECONOMIC STABILITY: HOUSING INSECURITY: HOME SAFETY: WITH OT EVALUATION AND DISCHARGE.

## 2024-07-15 ASSESSMENT — ENCOUNTER SYMPTOMS
HIGHEST PAIN SEVERITY IN PAST 24 HOURS: 8/10
PAIN: 1
PAIN LOCATION - PAIN SEVERITY: 0/10
PAIN LOCATION - RELIEVING FACTORS: MEDICATION, ICE
PAIN LOCATION: RIGHT HIP
LOWEST PAIN SEVERITY IN PAST 24 HOURS: 0/10
PERSON REPORTING PAIN: PATIENT
PAIN SEVERITY GOAL: 0/10
PERSON REPORTING PAIN: PATIENT
PAIN LOCATION - PAIN SEVERITY: 0/10
LOWEST PAIN SEVERITY IN PAST 24 HOURS: 0/10
HIGHEST PAIN SEVERITY IN PAST 24 HOURS: 8/10
SUBJECTIVE PAIN PROGRESSION: GRADUALLY IMPROVING
PAIN LOCATION: RIGHT HIP
PAIN LOCATION - PAIN DURATION: SINCE SURGERY
PAIN LOCATION - PAIN QUALITY: PULLING
PAIN: 1
PAIN LOCATION - PAIN FREQUENCY: WITH ACTIVITY

## 2024-07-15 ASSESSMENT — ACTIVITIES OF DAILY LIVING (ADL)
AMBULATION_DISTANCE/DURATION_TOLERATED: 100 FT
AMBULATION ASSISTANCE ON FLAT SURFACES: 1
AMBULATION ASSISTANCE: SUPERVISION
TOILETING: SUPERVISION
BATHING ASSESSED: 1
AMBULATION ASSISTANCE: 1
BATHING_CURRENT_FUNCTION: SUPERVISION
DRESSING_LB_CURRENT_FUNCTION: SUPERVISION
TOILETING: 1
DRESSING_UB_CURRENT_FUNCTION: SUPERVISION

## 2024-07-16 ENCOUNTER — TELEPHONE (OUTPATIENT)
Dept: ORTHOPEDIC SURGERY | Facility: HOSPITAL | Age: 74
End: 2024-07-16
Payer: MEDICARE

## 2024-07-17 ENCOUNTER — HOME CARE VISIT (OUTPATIENT)
Dept: HOME HEALTH SERVICES | Facility: HOME HEALTH | Age: 74
End: 2024-07-17
Payer: MEDICARE

## 2024-07-17 VITALS — DIASTOLIC BLOOD PRESSURE: 78 MMHG | TEMPERATURE: 97.9 F | SYSTOLIC BLOOD PRESSURE: 120 MMHG | HEART RATE: 83 BPM

## 2024-07-17 PROCEDURE — G0151 HHCP-SERV OF PT,EA 15 MIN: HCPCS | Mod: HHH

## 2024-07-17 SDOH — HEALTH STABILITY: PHYSICAL HEALTH
EXERCISE COMMENTS: INSTR IN AND PERFORMED: AP, QS, GS, HS, SAQ, HIP ABD 2X10, CUES FOR TECHNIQUE, CUES TO KEEP RLE NEUTRAL WITH ABD, HS, SEATED LAQ  REINFORCED FUNCTIONAL IMPLICATIONS OF THR PRECAUTIONS

## 2024-07-17 ASSESSMENT — ENCOUNTER SYMPTOMS
PAIN LOCATION - PAIN SEVERITY: 1/10
PERSON REPORTING PAIN: PATIENT
PAIN: 1
HIGHEST PAIN SEVERITY IN PAST 24 HOURS: 7/10
PAIN LOCATION: RIGHT HIP

## 2024-07-17 ASSESSMENT — ACTIVITIES OF DAILY LIVING (ADL)
AMBULATION_DISTANCE/DURATION_TOLERATED: 150 FT
AMBULATION ASSISTANCE ON UNEVEN SURFACES: 1
AMBULATION ASSISTANCE ON FLAT SURFACES: 1

## 2024-07-22 ENCOUNTER — HOME CARE VISIT (OUTPATIENT)
Dept: HOME HEALTH SERVICES | Facility: HOME HEALTH | Age: 74
End: 2024-07-22
Payer: MEDICARE

## 2024-07-22 PROCEDURE — G0151 HHCP-SERV OF PT,EA 15 MIN: HCPCS | Mod: HHH

## 2024-07-22 SDOH — HEALTH STABILITY: PHYSICAL HEALTH
EXERCISE COMMENTS: AND COMPENSATORY PATTERNS, INSTR SPOSUE IN GENTLE MASSAGE TECHNIQUES FOR CALF   CALF PAIN APPEARS TO BE MUSCULAR IN NATURE, VERY TIGHT UPON PALPATION WITH ATP, TPR WITH PT CONSENT PERONEALS, SOLEUS AND GASTROC, ADVISED OF POSSIBILITY OF POST TREATME

## 2024-07-22 SDOH — HEALTH STABILITY: PHYSICAL HEALTH
EXERCISE COMMENTS: INSTR IN AND PERFORMED : LOW LOAD LONG DURATION CALF STRETCH USING BELT   ADDED STANDING : AT SINK WITH BL UE SUPPORT: ALTERNATE MARCHING, HIP ABD 0-20 DEGREES R AND L 2 X 10, ADDED SEATED LAQ ON COUCH DUE TO LIMB LENGTH   INSTR IN FUNCTIONAL ANATOMY

## 2024-07-22 SDOH — HEALTH STABILITY: PHYSICAL HEALTH: EXERCISE COMMENTS: NT SORENESS OR BRUISING, PT VERBALIZED UNDERSTANDING

## 2024-07-22 ASSESSMENT — ENCOUNTER SYMPTOMS
PAIN: 1
PERSON REPORTING PAIN: PATIENT
PAIN LOCATION: RIGHT LEG
PAIN LOCATION - PAIN SEVERITY: 1/10
HIGHEST PAIN SEVERITY IN PAST 24 HOURS: 4/10

## 2024-07-22 ASSESSMENT — ACTIVITIES OF DAILY LIVING (ADL)
AMBULATION_DISTANCE/DURATION_TOLERATED: 200 FT
AMBULATION ASSISTANCE ON FLAT SURFACES: 1

## 2024-07-24 ENCOUNTER — HOME CARE VISIT (OUTPATIENT)
Dept: HOME HEALTH SERVICES | Facility: HOME HEALTH | Age: 74
End: 2024-07-24
Payer: MEDICARE

## 2024-07-24 VITALS — TEMPERATURE: 97.1 F | SYSTOLIC BLOOD PRESSURE: 120 MMHG | DIASTOLIC BLOOD PRESSURE: 62 MMHG | HEART RATE: 75 BPM

## 2024-07-24 PROCEDURE — G0151 HHCP-SERV OF PT,EA 15 MIN: HCPCS | Mod: HHH

## 2024-07-24 SDOH — HEALTH STABILITY: PHYSICAL HEALTH
EXERCISE COMMENTS: AP, QS, GS, HS, SAQ,HIP ABD 0-20 WITH FOCUS ON NEUTRAL POSITION AT HIP  2X 10 CUES FOR PACING, TECHNIQUE    SEATED: LAQ 2X8  STANDING : WITH BL UE SUPPORT : ALTERNATE MARCHING , HIP ABD  2X8

## 2024-07-24 ASSESSMENT — ENCOUNTER SYMPTOMS
PERSON REPORTING PAIN: PATIENT
PAIN LOCATION - PAIN SEVERITY: 1/10
PAIN: 1
HIGHEST PAIN SEVERITY IN PAST 24 HOURS: 3/10
PAIN LOCATION: RIGHT HIP

## 2024-07-24 ASSESSMENT — ACTIVITIES OF DAILY LIVING (ADL)
AMBULATION_DISTANCE/DURATION_TOLERATED: 150 FT
AMBULATION ASSISTANCE ON FLAT SURFACES: 1

## 2024-07-26 ENCOUNTER — OFFICE VISIT (OUTPATIENT)
Dept: ORTHOPEDIC SURGERY | Facility: CLINIC | Age: 74
End: 2024-07-26
Payer: MEDICARE

## 2024-07-26 DIAGNOSIS — Z96.641 STATUS POST RIGHT HIP REPLACEMENT: Primary | ICD-10-CM

## 2024-07-26 PROCEDURE — 99211 OFF/OP EST MAY X REQ PHY/QHP: CPT | Performed by: STUDENT IN AN ORGANIZED HEALTH CARE EDUCATION/TRAINING PROGRAM

## 2024-07-26 PROCEDURE — 1159F MED LIST DOCD IN RCRD: CPT | Performed by: STUDENT IN AN ORGANIZED HEALTH CARE EDUCATION/TRAINING PROGRAM

## 2024-07-26 PROCEDURE — 1157F ADVNC CARE PLAN IN RCRD: CPT | Performed by: STUDENT IN AN ORGANIZED HEALTH CARE EDUCATION/TRAINING PROGRAM

## 2024-07-26 ASSESSMENT — PAIN - FUNCTIONAL ASSESSMENT: PAIN_FUNCTIONAL_ASSESSMENT: NO/DENIES PAIN

## 2024-07-27 NOTE — PROGRESS NOTES
Fanta Christianson MD   Adult Reconstruction and Joint Replacement Surgery  Phone: [phone]     Fax: [phone]       Name: Rafael Walter  : 1950 (Age: 73 y.o.)  Date of Visit: 2024    HIP REPLACEMENT POSTOPERATIVE VISIT    Procedure: right direct anterior total hip replacement  Date of Surgery: 2024  Diagnosis: Right hip arthritis    Chief Complaint: Right hip replacement surgery follow-up    History of Present Illness:    The patient is now 2 weeks 1 days status post right direct anterior total hip replacement surgery.    The patient has mild or occasional pain.    Currently taking occasional over-the-counter medication for pain.     The patient has low stiffness.     The patient has slight limp.    Patient is walking with cane and walker for assistive device.    The patient goes up and down stairs minimally.    Patient can walk a few blocks.    The patient puts shoes and socks on with ease.     The patient is doing outpatient physical therapy.    The patient does not have trochanteric pain.    No fevers or drainage from the incision.    There are no concerns.    Physical Exam:    The patient is well appearing, alert and oriented to person, place and time.    The incision is well healed.  There is no sign of wound complication.    There is no tenderness over the greater trochanter.    Range of motion is: full extension to 90 degrees of flexion.    The hip internally rotates to 10 degrees and externally rotates to 30 degrees.    Abduction is 30 degrees and adduction is 10 degrees.    There is no instability of the joint.    Homans sign is negative.    Neurologic, and vascular examinations are normal.    PROMs  Hoos Jr-Hip Disability And Osteoarthritis Outcome Score For Joint Replacement    2024  7:31 PM EDT - Filed by Patient   Instructions    What amount of hip pain have you experienced the last week during the following activities?   Going up or down stairs Mild   Walking on an  uneven surface None   The following questions concern your physical function. By this we mean your ability to move around and to look after yourself. For each of the following activities please indicate the degree of difficulty you have experienced in the last week due to your hip.   Rising from sitting Mild   Bending to floor/ an object None   Lying in bed (turning over, maintaining hip position) None   Sitting None   Hoos Jr Scoring (range: 0 - 100) 85.26     [facility] [place] [facility] Item Health Survey (Vr-12)    3/18/2024  9:46 PM EDT - Filed by Patient   In general, would you say your health is: Very Good   The following questions are about activities you might do during a typical day. Does your health now limit you in these activities?  If so, how much?   Moderate activities, such as moving a table, pushing a vacuum , bowling, or playing golf? 3 No, Not Limited At All   Climbing several flights of stairs? Yes, Limited A Little   During the past 4 weeks, have you had any of the following problems with your work or other regular daily activities as a result of your physical health?   Accomplished less than you would like. Yes, A Little Of The Time   Were limited in the kind of work or other activities. Yes, Some Of The Time   During the past 4 weeks, have you had any of the following problems with your work or other regular daily activities as a result of any emotional problems (such as feeling depressed or anxious)?   Accomplished less than you would like Yes, A Little Of The Time   Didn't do work or other activities as carefully as usual No, None Of The Time   During the past 4 weeks, how much did pain interfere with your normal work (including both work outside the home and house work)? A Little Bit   How much of the time during the past 4 weeks:   Have you felt calm and peaceful? Most Of The Time   Did you have a lot of energy? A Good Bit Of The Time   Have you felt downhearted and blue? None Of The  Time   During the past 4 weeks, how much of the time has your physical health or emotional problems interfered with your social activities (like visiting with friends, relatives, etc.)? None Of The Time   Compared to one year ago, how would you rate your physical health in general now? About The Same   Compared to one year ago, how would you rate your emotional problems (such as feeling anxious, depressed or irritable) now? About The Same   Ort Vr-12 Question Pcs (range: 0 - 100) 43.06   Ort Vr-12 Question McS (range: 0 - 100) 58.81           Imaging:    X-rays were personally reviewed today and show implants in good position with no evidence of complication.    Impression and Plan:    73 y.o. male  who is 2 weeks 1 days from right direct anterior total hip replacement.    The patient is doing well following total hip replacement surgery.  Antibiotic prophylaxis prior to dental procedures were reviewed.  Hip precautions were reviewed.  Long-term failure mechanisms were reviewed.  The patient was asked to contact the office sooner if there are any concerns. New physical therapy prescription was given and exercises reviewed with the patient in the office. Their questions were answered.     RTC: 6 weeks      X-rays at next visit: Postop JAC series     _____________________  Fanta Christianson MD   Attending Orthopaedic Surgeon  [facility]    [facility]    This office note was transcribed with dictation software.  Please excuse any typographical errors, program misunderstandings leading to inadvertent insertions or deletions of inappropriate wording, pronoun errors and other unintentional transcription errors not noticed on proof-reading.

## 2024-07-29 ENCOUNTER — HOME CARE VISIT (OUTPATIENT)
Dept: HOME HEALTH SERVICES | Facility: HOME HEALTH | Age: 74
End: 2024-07-29
Payer: MEDICARE

## 2024-07-29 VITALS — SYSTOLIC BLOOD PRESSURE: 120 MMHG | HEART RATE: 98 BPM | TEMPERATURE: 98.4 F | DIASTOLIC BLOOD PRESSURE: 78 MMHG

## 2024-07-29 PROCEDURE — G0151 HHCP-SERV OF PT,EA 15 MIN: HCPCS | Mod: HHH

## 2024-07-29 SDOH — HEALTH STABILITY: PHYSICAL HEALTH
EXERCISE COMMENTS: HS,SAQ,HIP ABD 0-20 WITH FOCUS ON NEUTRAL POSITION AT HIP 2X 10 CUES FOR PACING, TECHNIQUE , INSTR INAND PERFORMED SIDELYING HIP ABD 2X6  SEATED: LAQ 2X10   STANDING : WITH BL UE SUPPORT : ALTERNATE MARCHING , HIP ABD 2X10

## 2024-07-29 ASSESSMENT — ENCOUNTER SYMPTOMS
PAIN: 1
PAIN LOCATION - PAIN SEVERITY: 0/10
HIGHEST PAIN SEVERITY IN PAST 24 HOURS: 2/10
PAIN LOCATION: RIGHT HIP
PERSON REPORTING PAIN: PATIENT

## 2024-07-29 ASSESSMENT — ACTIVITIES OF DAILY LIVING (ADL)
AMBULATION ASSISTANCE ON UNEVEN SURFACES: 1
AMBULATION ASSISTANCE ON FLAT SURFACES: 1
AMBULATION_DISTANCE/DURATION_TOLERATED: 150 FT

## 2024-07-31 ENCOUNTER — HOME CARE VISIT (OUTPATIENT)
Dept: HOME HEALTH SERVICES | Facility: HOME HEALTH | Age: 74
End: 2024-07-31
Payer: MEDICARE

## 2024-07-31 VITALS — TEMPERATURE: 97.2 F | SYSTOLIC BLOOD PRESSURE: 110 MMHG | DIASTOLIC BLOOD PRESSURE: 78 MMHG | HEART RATE: 63 BPM

## 2024-07-31 PROCEDURE — G0151 HHCP-SERV OF PT,EA 15 MIN: HCPCS | Mod: HHH

## 2024-07-31 SDOH — HEALTH STABILITY: PHYSICAL HEALTH
EXERCISE COMMENTS: ASSESSED CARRYOV OVER OF SIDELYING ABD, INSTR IN HOOKLYING HIP FLEXION WITH ABDOMINAL BRACE 2X8CUES FOR TECHNIQUE AND POSITIONING   INSTR TO CONT WITH HEP DAILY IN ADDITION TO WALKING PROGRAM

## 2024-07-31 ASSESSMENT — BALANCE ASSESSMENTS
BALANCE SCORE: 13
TURNING 360 DEGREES STEPS: 1 - CONTINUOUS STEPS
IMMEDIATE STANDING BALANCE FIRST 5 SECONDS: 2 - STEADY WITHOUT WALKER OR OTHER SUPPORT
NUDGED: 2 - STEADY
STANDING BALANCE: 1 - STEADY BUT WIDE STANCE AND USES CANE OR OTHER SUPPORT
SITTING BALANCE: 1 - STEADY, SAFE
ARISING SCORE: 1
EYES CLOSED AT MAXIMUM POSITION NUDGED: 1 - STEADY
NUDGED SCORE: 2
ATTEMPTS TO ARISE: 2 - ABLE TO RISE, ONE ATTEMPT
ARISES: 1 - ABLE, USES ARMS TO HELP
SITTING DOWN: 1 - USES ARMS OR NOT SMOOTH MOTION

## 2024-07-31 ASSESSMENT — GAIT ASSESSMENTS
INITIATION OF GAIT IMMEDIATELY AFTER GO: 1 - NO HESITANCY
BALANCE AND GAIT SCORE: 21
STEP SYMMETRY: 1 - RIGHT AND LEFT STEP LENGTH APPEAR EQUAL
GAIT SCORE: 8
TRUNK: 0 - MARKED SWAY OR USES WALKING AID
PATH SCORE: 1
PATH: 1 - MILD/MODERATE DEVIATION OR USES WALKING AID
STEP CONTINUITY: 1 - STEPS APPEAR CONTINUOUS
TRUNK SCORE: 0
WALKING STANCE: 0 - HEELS APART

## 2024-07-31 ASSESSMENT — ACTIVITIES OF DAILY LIVING (ADL)
AMBULATION ASSISTANCE ON FLAT SURFACES: 1
AMBULATION_DISTANCE/DURATION_TOLERATED: 75 FT X 2

## 2024-07-31 ASSESSMENT — ENCOUNTER SYMPTOMS
PAIN LOCATION: RIGHT HIP
OCCASIONAL FEELINGS OF UNSTEADINESS: 0
PAIN: 1
PAIN LOCATION - PAIN SEVERITY: 0/10
PERSON REPORTING PAIN: PATIENT
HIGHEST PAIN SEVERITY IN PAST 24 HOURS: 2/10

## 2024-08-07 ENCOUNTER — HOME CARE VISIT (OUTPATIENT)
Dept: HOME HEALTH SERVICES | Facility: HOME HEALTH | Age: 74
End: 2024-08-07
Payer: MEDICARE

## 2024-08-07 VITALS — HEART RATE: 69 BPM | DIASTOLIC BLOOD PRESSURE: 60 MMHG | SYSTOLIC BLOOD PRESSURE: 122 MMHG | TEMPERATURE: 97.9 F

## 2024-08-07 PROCEDURE — G0151 HHCP-SERV OF PT,EA 15 MIN: HCPCS | Mod: HHH

## 2024-08-07 SDOH — HEALTH STABILITY: PHYSICAL HEALTH
EXERCISE COMMENTS: MODIFIED HEP FOR UPCOMING DC : INSTR IN AND PERFORMED: SIDELYING R HIP ABD, SUPINE HIP FLEXION , SEATED BL LAQ AND STANDING SQUATS WITH BL UE SUPPORT , ATTEMPTED STS WITHOUT UE SUPPORT , BUT UNABLE AT THIS TIME

## 2024-08-07 ASSESSMENT — ACTIVITIES OF DAILY LIVING (ADL)
AMBULATION ASSISTANCE ON FLAT SURFACES: 1
AMBULATION ASSISTANCE ON UNEVEN SURFACES: 1

## 2024-08-07 ASSESSMENT — ENCOUNTER SYMPTOMS: DENIES PAIN: 1

## 2024-08-08 DIAGNOSIS — N52.9 MALE ERECTILE DISORDER OF ORGANIC ORIGIN: ICD-10-CM

## 2024-08-08 RX ORDER — SILDENAFIL 50 MG/1
50 TABLET, FILM COATED ORAL DAILY PRN
Qty: 10 TABLET | Refills: 1 | Status: SHIPPED | OUTPATIENT
Start: 2024-08-08

## 2024-08-14 ENCOUNTER — HOME CARE VISIT (OUTPATIENT)
Dept: HOME HEALTH SERVICES | Facility: HOME HEALTH | Age: 74
End: 2024-08-14
Payer: MEDICARE

## 2024-08-14 VITALS — DIASTOLIC BLOOD PRESSURE: 62 MMHG | TEMPERATURE: 98.2 F | SYSTOLIC BLOOD PRESSURE: 120 MMHG | HEART RATE: 66 BPM

## 2024-08-14 PROCEDURE — G0151 HHCP-SERV OF PT,EA 15 MIN: HCPCS | Mod: HHH

## 2024-08-14 SDOH — HEALTH STABILITY: PHYSICAL HEALTH
EXERCISE COMMENTS: ASSESSED CARRYOVER OF HEP, INSTR IN STRATEGIES TO IMPROVE LONG TERM COMPLIANCE, INSTR IN SAFE PROGRESSION OF HEP TO PROMOTE CONT GAINS

## 2024-08-14 ASSESSMENT — ACTIVITIES OF DAILY LIVING (ADL)
HOME_HEALTH_OASIS: 00
AMBULATION ASSISTANCE ON UNEVEN SURFACES: 1
AMBULATION ASSISTANCE ON FLAT SURFACES: 1
OASIS_M1830: 01

## 2024-08-14 ASSESSMENT — GAIT ASSESSMENTS
PATH: 2 - STRAIGHT WITHOUT WALKING AID
STEP SYMMETRY: 1 - RIGHT AND LEFT STEP LENGTH APPEAR EQUAL
PATH SCORE: 2
TRUNK SCORE: 2
STEP CONTINUITY: 1 - STEPS APPEAR CONTINUOUS
INITIATION OF GAIT IMMEDIATELY AFTER GO: 1 - NO HESITANCY
WALKING STANCE: 0 - HEELS APART
TRUNK: 2 - NO SWAY, NO FLEXION, NO USE OF ARMS, NO WALKING AID
BALANCE AND GAIT SCORE: 26
GAIT SCORE: 11

## 2024-08-14 ASSESSMENT — ENCOUNTER SYMPTOMS
OCCASIONAL FEELINGS OF UNSTEADINESS: 0
DENIES PAIN: 1
DEPRESSION: 0
LOSS OF SENSATION IN FEET: 0

## 2024-08-14 ASSESSMENT — BALANCE ASSESSMENTS
ARISES: 1 - ABLE, USES ARMS TO HELP
ATTEMPTS TO ARISE: 2 - ABLE TO RISE, ONE ATTEMPT
NUDGED SCORE: 2
TURNING 360 DEGREES STEPS: 1 - CONTINUOUS STEPS
IMMEDIATE STANDING BALANCE FIRST 5 SECONDS: 2 - STEADY WITHOUT WALKER OR OTHER SUPPORT
SITTING DOWN: 2 - SAFE, SMOOTH MOTION
BALANCE SCORE: 15
EYES CLOSED AT MAXIMUM POSITION NUDGED: 1 - STEADY
STANDING BALANCE: 2 - NARROW STANCE WITHOUT SUPPORT
ARISING SCORE: 1
NUDGED: 2 - STEADY
SITTING BALANCE: 1 - STEADY, SAFE

## 2024-08-23 ENCOUNTER — HOSPITAL ENCOUNTER (OUTPATIENT)
Dept: RADIOLOGY | Facility: CLINIC | Age: 74
Discharge: HOME | End: 2024-08-23
Payer: MEDICARE

## 2024-08-23 ENCOUNTER — OFFICE VISIT (OUTPATIENT)
Dept: ORTHOPEDIC SURGERY | Facility: CLINIC | Age: 74
End: 2024-08-23
Payer: MEDICARE

## 2024-08-23 DIAGNOSIS — Z96.641 STATUS POST RIGHT HIP REPLACEMENT: Primary | ICD-10-CM

## 2024-08-23 DIAGNOSIS — M16.11 UNILATERAL PRIMARY OSTEOARTHRITIS, RIGHT HIP: ICD-10-CM

## 2024-08-23 PROCEDURE — 1159F MED LIST DOCD IN RCRD: CPT | Performed by: STUDENT IN AN ORGANIZED HEALTH CARE EDUCATION/TRAINING PROGRAM

## 2024-08-23 PROCEDURE — 73502 X-RAY EXAM HIP UNI 2-3 VIEWS: CPT | Mod: RT

## 2024-08-23 PROCEDURE — 1157F ADVNC CARE PLAN IN RCRD: CPT | Performed by: STUDENT IN AN ORGANIZED HEALTH CARE EDUCATION/TRAINING PROGRAM

## 2024-08-23 PROCEDURE — 99211 OFF/OP EST MAY X REQ PHY/QHP: CPT | Performed by: STUDENT IN AN ORGANIZED HEALTH CARE EDUCATION/TRAINING PROGRAM

## 2024-08-23 ASSESSMENT — PAIN - FUNCTIONAL ASSESSMENT: PAIN_FUNCTIONAL_ASSESSMENT: NO/DENIES PAIN

## 2024-08-23 NOTE — PROGRESS NOTES
Fanta Christianson MD   Adult Reconstruction and Joint Replacement Surgery  Phone: [phone]     Fax: [phone]       Name: Rafael Walter  : 1950 (Age: 73 y.o.)  Date of Visit: 2024    HIP REPLACEMENT POSTOPERATIVE VISIT    Procedure: right direct anterior total hip replacement  Date of Surgery: 2024  Diagnosis: Right hip arthritis    Chief Complaint: Right hip replacement surgery follow-up    History of Present Illness:    The patient is now 6 weeks 1 days status post right direct anterior total hip replacement surgery.    The patient has no pain.    Currently taking nothing for pain.     The patient has low stiffness.     The patient has no limp.    Patient is walking with nothing for assistive device.    The patient goes up and down stairs normally.    Patient can walk 4 blocks without limitation.    The patient puts shoes and socks on with ease.     The patient is doing HEP for physical therapy.    The patient does not have trochanteric pain.    No fevers or drainage from the incision.    There are no concerns.    Physical Exam:    The patient is well appearing, alert and oriented to person, place and time.    The incision is well healed.  There is no sign of wound complication.    There is no tenderness over the greater trochanter.    Range of motion is: full extension to 100 degrees of flexion.    The hip internally rotates to 20 degrees and externally rotates to 40 degrees.    Abduction is 40 degrees and adduction is 20 degrees.    There is no instability of the joint.    Homans sign is negative.    Neurologic, and vascular examinations are normal.    PROMs  Hoos Jr-Hip Disability And Osteoarthritis Outcome Score For Joint Replacement    2024  7:31 PM EDT - Filed by Patient   Instructions    What amount of hip pain have you experienced the last week during the following activities?   Going up or down stairs Mild   Walking on an uneven surface None   The following questions  concern your physical function. By this we mean your ability to move around and to look after yourself. For each of the following activities please indicate the degree of difficulty you have experienced in the last week due to your hip.   Rising from sitting Mild   Bending to floor/ an object None   Lying in bed (turning over, maintaining hip position) None   Sitting None   Hoos Jr Scoring (range: 0 - 100) 85.26     [facility] [place] 12 Item Health Survey (Vr-12)    3/18/2024  9:46 PM EDT - Filed by Patient   In general, would you say your health is: Very Good   The following questions are about activities you might do during a typical day. Does your health now limit you in these activities?  If so, how much?   Moderate activities, such as moving a table, pushing a vacuum , bowling, or playing golf? 3 No, Not Limited At All   Climbing several flights of stairs? Yes, Limited A Little   During the past 4 weeks, have you had any of the following problems with your work or other regular daily activities as a result of your physical health?   Accomplished less than you would like. Yes, A Little Of The Time   Were limited in the kind of work or other activities. Yes, Some Of The Time   During the past 4 weeks, have you had any of the following problems with your work or other regular daily activities as a result of any emotional problems (such as feeling depressed or anxious)?   Accomplished less than you would like Yes, A Little Of The Time   Didn't do work or other activities as carefully as usual No, None Of The Time   During the past 4 weeks, how much did pain interfere with your normal work (including both work outside the home and house work)? A Little Bit   How much of the time during the past 4 weeks:   Have you felt calm and peaceful? Most Of The Time   Did you have a lot of energy? A Good Bit Of The Time   Have you felt downhearted and blue? None Of The Time   During the past 4 weeks, how much of  the time has your physical health or emotional problems interfered with your social activities (like visiting with friends, relatives, etc.)? None Of The Time   Compared to one year ago, how would you rate your physical health in general now? About The Same   Compared to one year ago, how would you rate your emotional problems (such as feeling anxious, depressed or irritable) now? About The Same   Ort Vr-12 Question Pcs (range: 0 - 100) 43.06   Ort Vr-12 Question McS (range: 0 - 100) 58.81           Imaging:    X-rays were personally reviewed today and show implants in good position with no evidence of complication.    Impression and Plan:    73 y.o. male  who is 6 weeks 1 days from right direct anterior total hip replacement.    The patient is doing well following total hip replacement surgery.  Antibiotic prophylaxis prior to dental procedures were reviewed.  Hip precautions were reviewed.  Long-term failure mechanisms were reviewed.  The patient was asked to contact the office sooner if there are any concerns. New physical therapy prescription was given and exercises reviewed with the patient in the office. Their questions were answered.     RTC: 6 months     X-rays at next visit: Postop JAC series -bilateral total hips    _____________________  Fanta Christianson MD   Attending Orthopaedic Surgeon  [facility]    [facility]    This office note was transcribed with dictation software.  Please excuse any typographical errors, program misunderstandings leading to inadvertent insertions or deletions of inappropriate wording, pronoun errors and other unintentional transcription errors not noticed on proof-reading.

## 2024-09-09 ENCOUNTER — APPOINTMENT (OUTPATIENT)
Dept: PRIMARY CARE | Facility: CLINIC | Age: 74
End: 2024-09-09
Payer: MEDICARE

## 2024-09-11 ENCOUNTER — APPOINTMENT (OUTPATIENT)
Dept: PRIMARY CARE | Facility: CLINIC | Age: 74
End: 2024-09-11
Payer: MEDICARE

## 2024-09-11 VITALS
RESPIRATION RATE: 16 BRPM | OXYGEN SATURATION: 98 % | BODY MASS INDEX: 23.62 KG/M2 | DIASTOLIC BLOOD PRESSURE: 78 MMHG | HEART RATE: 72 BPM | TEMPERATURE: 96.4 F | WEIGHT: 189 LBS | SYSTOLIC BLOOD PRESSURE: 132 MMHG

## 2024-09-11 DIAGNOSIS — Z12.5 PROSTATE CANCER SCREENING: Primary | ICD-10-CM

## 2024-09-11 DIAGNOSIS — E78.2 MIXED HYPERLIPIDEMIA: ICD-10-CM

## 2024-09-11 PROCEDURE — G0439 PPPS, SUBSEQ VISIT: HCPCS | Performed by: INTERNAL MEDICINE

## 2024-09-11 PROCEDURE — 3075F SYST BP GE 130 - 139MM HG: CPT | Performed by: INTERNAL MEDICINE

## 2024-09-11 PROCEDURE — 1160F RVW MEDS BY RX/DR IN RCRD: CPT | Performed by: INTERNAL MEDICINE

## 2024-09-11 PROCEDURE — 1159F MED LIST DOCD IN RCRD: CPT | Performed by: INTERNAL MEDICINE

## 2024-09-11 PROCEDURE — 1157F ADVNC CARE PLAN IN RCRD: CPT | Performed by: INTERNAL MEDICINE

## 2024-09-11 PROCEDURE — 1158F ADVNC CARE PLAN TLK DOCD: CPT | Performed by: INTERNAL MEDICINE

## 2024-09-11 PROCEDURE — 3078F DIAST BP <80 MM HG: CPT | Performed by: INTERNAL MEDICINE

## 2024-09-11 PROCEDURE — 1036F TOBACCO NON-USER: CPT | Performed by: INTERNAL MEDICINE

## 2024-09-11 PROCEDURE — 99214 OFFICE O/P EST MOD 30 MIN: CPT | Performed by: INTERNAL MEDICINE

## 2024-09-11 PROCEDURE — 1170F FXNL STATUS ASSESSED: CPT | Performed by: INTERNAL MEDICINE

## 2024-09-11 PROCEDURE — 1123F ACP DISCUSS/DSCN MKR DOCD: CPT | Performed by: INTERNAL MEDICINE

## 2024-09-11 ASSESSMENT — ACTIVITIES OF DAILY LIVING (ADL)
DOING_HOUSEWORK: INDEPENDENT
DRESSING: INDEPENDENT
BATHING: INDEPENDENT
TAKING_MEDICATION: INDEPENDENT
MANAGING_FINANCES: INDEPENDENT
GROCERY_SHOPPING: INDEPENDENT

## 2024-09-11 ASSESSMENT — ENCOUNTER SYMPTOMS
DIARRHEA: 0
ABDOMINAL PAIN: 0
CONSTIPATION: 0
SLEEP DISTURBANCE: 0

## 2024-09-11 ASSESSMENT — PATIENT HEALTH QUESTIONNAIRE - PHQ9
SUM OF ALL RESPONSES TO PHQ9 QUESTIONS 1 AND 2: 0
1. LITTLE INTEREST OR PLEASURE IN DOING THINGS: NOT AT ALL
2. FEELING DOWN, DEPRESSED OR HOPELESS: NOT AT ALL

## 2024-09-11 NOTE — PROGRESS NOTES
Patient here for a medicare wellness visit and follow up    Subjective   Patient ID: Rafael Walter is a 74 y.o. male who presents for Follow-up and Medicare Annual Wellness Visit Subsequent.    The patient is currently following with  from Oncology for a history of CLL, and states that the condition is stable.     The patient is no longer taking Eliquis after being prescribed the medication transiently while in the hospital following right hip arthroplasty.    The patient notes nocturia of three times per evening, though this is tolerable and stable.  He notes occasional urinary leakage in the morning as well.    The patient wears a hearing aid device, and finds this is working well.  He also denies significant vision changes, and wears prescription lenses.  He reports good sleep quality.  The patient denies any abdominal pain or other bowel problems.    The patient's son was recently diagnosed with Hashimoto's thyroiditis.     The patient is  with one son, and one daughter.      Review of Systems   HENT:  Positive for hearing loss.    Eyes:  Negative for visual disturbance.   Gastrointestinal:  Negative for abdominal pain, constipation and diarrhea.   Genitourinary:         Positive for nocturia of three times per evening, and urinary leakage in the morning.   Psychiatric/Behavioral:  Negative for sleep disturbance.        Objective   Physical Exam  Constitutional:       Appearance: Normal appearance.   Neck:      Vascular: No carotid bruit.   Cardiovascular:      Rate and Rhythm: Normal rate and regular rhythm.      Heart sounds: Normal heart sounds.   Pulmonary:      Effort: Pulmonary effort is normal.      Breath sounds: Normal breath sounds.   Abdominal:      General: Bowel sounds are normal.      Palpations: Abdomen is soft.      Tenderness: There is no abdominal tenderness.   Skin:     General: Skin is warm and dry.   Neurological:      General: No focal deficit present.      Mental Status: He  is alert and oriented to person, place, and time. Mental status is at baseline.   Psychiatric:         Mood and Affect: Mood normal.         Behavior: Behavior normal.         Assessment/Plan   Problem List Items Addressed This Visit             ICD-10-CM    Hyperlipidemia, unspecified E78.5    Relevant Orders    Lipid panel     Other Visit Diagnoses         Codes    Prostate cancer screening    -  Primary Z12.5    Relevant Orders    Prostate Spec.Ag,Screen            Medicare Wellness Examination Done  -  Discussed healthy diet and regular exercise.    -  Physical exam overall unremarkable. Immunizations reviewed and updated accordingly. Healthy lifestyle choices discussed (tobacco avoidance, appropriate alcohol use, avoidance of illicit substances).   -  Patient is wearing seatbelt.   -  Screening lab work ordered as indicated.    -  Age appropriate screening tests reviewed with patient.       IMPRESSIONS/PLAN:    Prostate Cancer Screening  -  Last PSA wnl 4/2023.  Ordered PSA.    HTN   - /78. Continue Losartan 50 mg daily,     HLD  - Triglycerides high per 4/2023.     Lupus nephritis   - has seen nephrology / rheum    BPH/Nocturia   - Last PSA wnl 4/2023.       Left Hip Pain  - s/p left total hip arthroplasty 3/29/2024 with  from Orthopedic Surgery.     Right Hip Pain  - s/p right total hip arthroplasty 7/11/2024 with  from Orthopedic Surgery.     Chronic lymphocytic leukemia   - Following with  from Oncology.  Previously followed with Dr. Field in Oncology.     Skin Lesion:  - Previously ordered referral to  in Dermatology.     Health Maintenance  -Routine labs 7/2024.  Ordered lipid panel to be completed in the fasting state.  Last PSA wnl 4/2023.  Last Cologuard 2022, repeat due 2025. Advised to get Covid booster, Influenza vaccine, and Shingrix vaccine.       Follow up in 6 months.  Call sooner if needed.       Scribe Attestation  By signing my name below, I,  Frank Mckinnon   attest that this documentation has been prepared under the direction and in the presence of Milo Gold DO.   Lyly Vazquez 09/11/24 9:35 AM

## 2024-10-12 DIAGNOSIS — N52.9 MALE ERECTILE DISORDER OF ORGANIC ORIGIN: ICD-10-CM

## 2024-10-14 RX ORDER — SILDENAFIL 50 MG/1
50 TABLET, FILM COATED ORAL DAILY PRN
Qty: 10 TABLET | Refills: 1 | Status: SHIPPED | OUTPATIENT
Start: 2024-10-14

## 2024-10-24 ENCOUNTER — LAB (OUTPATIENT)
Dept: LAB | Facility: HOSPITAL | Age: 74
End: 2024-10-24
Payer: MEDICARE

## 2024-10-24 ENCOUNTER — OFFICE VISIT (OUTPATIENT)
Dept: HEMATOLOGY/ONCOLOGY | Facility: HOSPITAL | Age: 74
End: 2024-10-24
Payer: MEDICARE

## 2024-10-24 VITALS
SYSTOLIC BLOOD PRESSURE: 135 MMHG | TEMPERATURE: 97 F | OXYGEN SATURATION: 100 % | DIASTOLIC BLOOD PRESSURE: 72 MMHG | HEART RATE: 66 BPM

## 2024-10-24 DIAGNOSIS — N40.1 BENIGN PROSTATIC HYPERPLASIA WITH URINARY FREQUENCY: ICD-10-CM

## 2024-10-24 DIAGNOSIS — R35.0 BENIGN PROSTATIC HYPERPLASIA WITH URINARY FREQUENCY: ICD-10-CM

## 2024-10-24 DIAGNOSIS — C91.10 CHRONIC LYMPHOCYTIC LEUKEMIA (MULTI): ICD-10-CM

## 2024-10-24 DIAGNOSIS — Z12.5 PROSTATE CANCER SCREENING: ICD-10-CM

## 2024-10-24 LAB
ALBUMIN SERPL BCP-MCNC: 4.4 G/DL (ref 3.4–5)
ALP SERPL-CCNC: 76 U/L (ref 33–136)
ALT SERPL W P-5'-P-CCNC: 11 U/L (ref 10–52)
ANION GAP SERPL CALC-SCNC: 11 MMOL/L (ref 10–20)
AST SERPL W P-5'-P-CCNC: 15 U/L (ref 9–39)
BASOPHILS # BLD AUTO: 0.02 X10*3/UL (ref 0–0.1)
BASOPHILS NFR BLD AUTO: 0.4 %
BILIRUB SERPL-MCNC: 0.6 MG/DL (ref 0–1.2)
BUN SERPL-MCNC: 13 MG/DL (ref 6–23)
CALCIUM SERPL-MCNC: 9.2 MG/DL (ref 8.6–10.3)
CHLORIDE SERPL-SCNC: 104 MMOL/L (ref 98–107)
CO2 SERPL-SCNC: 30 MMOL/L (ref 21–32)
CREAT SERPL-MCNC: 0.87 MG/DL (ref 0.5–1.3)
EGFRCR SERPLBLD CKD-EPI 2021: 89 ML/MIN/1.73M*2
EOSINOPHIL # BLD AUTO: 0.1 X10*3/UL (ref 0–0.4)
EOSINOPHIL NFR BLD AUTO: 1.9 %
ERYTHROCYTE [DISTWIDTH] IN BLOOD BY AUTOMATED COUNT: 16.4 % (ref 11.5–14.5)
GLUCOSE SERPL-MCNC: 69 MG/DL (ref 74–99)
HCT VFR BLD AUTO: 46.5 % (ref 41–52)
HGB BLD-MCNC: 14.8 G/DL (ref 13.5–17.5)
IMM GRANULOCYTES # BLD AUTO: 0.01 X10*3/UL (ref 0–0.5)
IMM GRANULOCYTES NFR BLD AUTO: 0.2 % (ref 0–0.9)
LYMPHOCYTES # BLD AUTO: 1.61 X10*3/UL (ref 0.8–3)
LYMPHOCYTES NFR BLD AUTO: 30.5 %
MCH RBC QN AUTO: 25.4 PG (ref 26–34)
MCHC RBC AUTO-ENTMCNC: 31.8 G/DL (ref 32–36)
MCV RBC AUTO: 80 FL (ref 80–100)
MONOCYTES # BLD AUTO: 0.44 X10*3/UL (ref 0.05–0.8)
MONOCYTES NFR BLD AUTO: 8.3 %
NEUTROPHILS # BLD AUTO: 3.1 X10*3/UL (ref 1.6–5.5)
NEUTROPHILS NFR BLD AUTO: 58.7 %
NRBC BLD-RTO: 0 /100 WBCS (ref 0–0)
PLATELET # BLD AUTO: 143 X10*3/UL (ref 150–450)
POTASSIUM SERPL-SCNC: 4.2 MMOL/L (ref 3.5–5.3)
PROT SERPL-MCNC: 7 G/DL (ref 6.4–8.2)
PSA SERPL-MCNC: 2.89 NG/ML
RBC # BLD AUTO: 5.82 X10*6/UL (ref 4.5–5.9)
SODIUM SERPL-SCNC: 141 MMOL/L (ref 136–145)
WBC # BLD AUTO: 5.3 X10*3/UL (ref 4.4–11.3)

## 2024-10-24 PROCEDURE — 99213 OFFICE O/P EST LOW 20 MIN: CPT | Performed by: INTERNAL MEDICINE

## 2024-10-24 PROCEDURE — 80053 COMPREHEN METABOLIC PANEL: CPT

## 2024-10-24 PROCEDURE — 3078F DIAST BP <80 MM HG: CPT | Performed by: INTERNAL MEDICINE

## 2024-10-24 PROCEDURE — 36415 COLL VENOUS BLD VENIPUNCTURE: CPT

## 2024-10-24 PROCEDURE — 1126F AMNT PAIN NOTED NONE PRSNT: CPT | Performed by: INTERNAL MEDICINE

## 2024-10-24 PROCEDURE — 3075F SYST BP GE 130 - 139MM HG: CPT | Performed by: INTERNAL MEDICINE

## 2024-10-24 PROCEDURE — 1157F ADVNC CARE PLAN IN RCRD: CPT | Performed by: INTERNAL MEDICINE

## 2024-10-24 PROCEDURE — 85025 COMPLETE CBC W/AUTO DIFF WBC: CPT

## 2024-10-24 PROCEDURE — 84153 ASSAY OF PSA TOTAL: CPT

## 2024-10-24 ASSESSMENT — PAIN SCALES - GENERAL: PAINLEVEL_OUTOF10: 0-NO PAIN

## 2024-10-24 NOTE — PROGRESS NOTES
Patient ID: Rafael Walter is a 74 y.o. male.    Subjective    The patient has a history of CLL dated first in 2019, most recently having completed Venetoclax + ritux in 7/2022, details below.     Today he feels well. Had Lt hip replacement in 3/2024 the in 7/2024, using titanium. No fever, weight loss, night sweating. No nausea.     Treatment Synopsis:   - 1/2016 until 2/2019, CASE 5913 (curcumin + vitamin D for CLL).   - One cycle of Chlorambucil  (started on 6/10/2019 8mg per day) and obinutuzumab (started 7/22/19 when PLT <100).  Obinutuzumab restarted on 9/4/19 with profound neutropenia .  - Venetoclax 200 mg daily (with fluconazole) with monthly rituximab  6/1/2020.  - Cycle #2 started on 7/13/20.  - Cycle #3 started on 8/10/20-therapy changed from 200 mg venetoclax dosing to 400  mg and fluconazole discontinued.  - Cycle #4 9/10/20- clon seq showed excellent response, CT chest showed resolution of bibasilar infiltrates.  - Cycle #5 started 10/8/2020.  - Cycle #6 11/5/2020- completed rituximab.  - Venetoclax continued through 7/2022 (2 full years).            Objective    BSA: There is no height or weight on file to calculate BSA.  /72 (BP Location: Right arm, Patient Position: Sitting, BP Cuff Size: Adult)   Pulse 66   Temp 36.1 °C (97 °F) (Temporal)   SpO2 100%      Physical Exam  Constitutional:       General: He is not in acute distress.     Appearance: He is not toxic-appearing.   HENT:      Head: Normocephalic.      Nose: Nose normal.      Mouth/Throat:      Mouth: Mucous membranes are moist.   Eyes:      Extraocular Movements: Extraocular movements intact.      Pupils: Pupils are equal, round, and reactive to light.   Cardiovascular:      Rate and Rhythm: Normal rate and regular rhythm.      Heart sounds: No murmur heard.  Pulmonary:      Effort: Pulmonary effort is normal.      Breath sounds: Normal breath sounds.   Abdominal:      General: Bowel sounds are normal.      Palpations: Abdomen is  soft. There is no mass.      Tenderness: There is no abdominal tenderness. There is no rebound.   Musculoskeletal:         General: No swelling, tenderness, deformity or signs of injury.      Right lower leg: No edema.      Left lower leg: No edema.   Skin:     Coloration: Skin is not jaundiced.      Findings: No bruising, lesion or rash.   Neurological:      Mental Status: He is alert and oriented to person, place, and time.      Cranial Nerves: No cranial nerve deficit.      Motor: No weakness.      Gait: Gait normal.   Psychiatric:         Mood and Affect: Mood normal.         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  - S/p curcumin + vit D [id number] (CASE 5913).  - WBC progressed and peaked at 286k on 6/4/2019. (was 184k IN 2/2019)  - S/p chlorambucil + obinutuzumab started 6/2019; c/b mold PNA.  - By June 2020 WBC was essentially normal, but clonoseq shows persistent clones (>8000) in blood.  - S/p Venetoclax + rituximab (6/2020-11/2020). Venetoclax continued through 7/2022.  - Doing well OFF therapy at this time. CBC/ALC stable. No new worrisome symptoms reported.  - 3/21/2024: clinically doing well without worrisome signs of relapse. No new lab. Labs in 12/2023 show mildly decreased PLT level, which has actually been recovering. No clear indication to initiate new treatments. MRD remains an investigational tool. Will defer testing.   -5/10/2024: CBC shows improvement in HGB PLT. Continue surveillance.   -10/24: Feels well. Cbc shows normal hgb, . WBC WNL. No concern. Continue surveillance.      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 No recent infections.  - He is no longer on acyclovir or Bactrim.    Plan 10/24/2024  -Monitor for  relapse.  -RTC 6 mon. Labs 1 week before.  -Add PSA per his request.      Time spent > 25 min, with more than 50% on counseling and coordinating care.     Cancer Staging   No matching staging information was found for the patient.      Oncology History    No history exists.                 Cecilio Luciano MD PhD

## 2024-12-20 ENCOUNTER — APPOINTMENT (OUTPATIENT)
Dept: PRIMARY CARE | Facility: CLINIC | Age: 74
End: 2024-12-20
Payer: MEDICARE

## 2025-01-28 ENCOUNTER — TELEPHONE (OUTPATIENT)
Dept: PRIMARY CARE | Facility: CLINIC | Age: 75
End: 2025-01-28
Payer: MEDICARE

## 2025-01-28 DIAGNOSIS — F41.8 SITUATIONAL ANXIETY: ICD-10-CM

## 2025-01-28 DIAGNOSIS — L98.9 SKIN LESION: Primary | ICD-10-CM

## 2025-01-28 RX ORDER — ALPRAZOLAM 0.25 MG/1
0.25 TABLET ORAL 3 TIMES DAILY PRN
Qty: 10 TABLET | Refills: 0 | Status: SHIPPED | OUTPATIENT
Start: 2025-01-28 | End: 2025-09-25

## 2025-02-21 ENCOUNTER — APPOINTMENT (OUTPATIENT)
Dept: ORTHOPEDIC SURGERY | Facility: CLINIC | Age: 75
End: 2025-02-21
Payer: MEDICARE

## 2025-03-02 NOTE — PROGRESS NOTES
" Fanta Christianson MD   Adult Reconstruction and Joint Replacement Surgery  Phone: [phone]     Fax: [phone]       Name: Rafael Walter  Age: 74 y.o.   : 1950   Date of Visit: 3/3/2025    HIP REPLACEMENT POSTOPERATIVE VISIT    Procedure: right direct anterior total hip replacement  Date of Surgery: 2024  Diagnosis: Right hip arthritis    Procedure: Left direct anterior total hip replacement  Date of surgery: 3/29/2024  Diagnosis: Left hip arthritis    Chief Complaint: Right hip replacement surgery follow-up    History of Present Illness:    The patient is now 7 months 20 days status post right direct anterior total hip replacement surgery.  He is also nearly 1 year out from left direct anterior total hip replacement surgery.    The patient has no pain.    Currently taking nothing for pain.     The patient has low stiffness.     The patient has no limp.    Patient is walking with nothing for assistive device.    The patient goes up and down stairs normally.    Patient can walk 6 blocks.    The patient puts shoes and socks on with ease.     The patient is doing HEP for physical therapy.    The patient does not have trochanteric pain.    No fevers or drainage from the incision.    There are no concerns.    The patient has returned to working out multiple days a week.    Physical Exam:    The patient is well appearing, alert and oriented to person, place and time.    The incision is well healed.  There is no sign of wound complication.    There is no tenderness over the greater trochanter.    Range of motion is: full extension to 105 degrees of flexion.    The hip internally rotates to 20 degrees and externally rotates to 45 degrees.    Abduction is 40 degrees and adduction is 20 degrees.    There is no instability of the joint.    Homans sign is negative.    Neurologic, and vascular examinations are normal.    PROMs   24 17:05 24 19:31   \"HOOS, JR. Score\" 61.82 85.26 "       Imaging:    X-rays were personally reviewed today and show implants in good position with no evidence of complication.    Impression and Plan:    This patient is 7 months 20 days from right direct anterior total hip replacement.  He is also nearly 1 year out from left direct anterior total hip replacement surgery.    The patient is doing well following total hip replacement surgery.  Antibiotic prophylaxis prior to dental procedures is recommended. Pain and symptom monitoring was reviewed.  Hip precautions were reviewed.  Long-term failure mechanisms were reviewed.  The patient was asked to contact the office sooner if there are any concerns.  Encouraged the patient to maintain strength and range of motion about the hip joints.  Their questions were answered.     RTC: Annually for surveillance of bilateral hip replacements    X-rays at next visit: Postop JAC series     _____________________  Fanta Christianson MD   Attending Orthopaedic Surgeon  [facility]    [facility]    This office note was transcribed with dictation software.  Please excuse any typographical errors, program misunderstandings leading to inadvertent insertions or deletions of inappropriate wording, pronoun errors and other unintentional transcription errors not noticed on proof-reading.

## 2025-03-03 ENCOUNTER — OFFICE VISIT (OUTPATIENT)
Dept: ORTHOPEDIC SURGERY | Facility: HOSPITAL | Age: 75
End: 2025-03-03
Payer: MEDICARE

## 2025-03-03 ENCOUNTER — HOSPITAL ENCOUNTER (OUTPATIENT)
Dept: RADIOLOGY | Facility: HOSPITAL | Age: 75
Discharge: HOME | End: 2025-03-03
Payer: MEDICARE

## 2025-03-03 DIAGNOSIS — Z96.641 STATUS POST RIGHT HIP REPLACEMENT: Primary | ICD-10-CM

## 2025-03-03 DIAGNOSIS — Z96.641 STATUS POST RIGHT HIP REPLACEMENT: ICD-10-CM

## 2025-03-03 DIAGNOSIS — Z96.642 S/P TOTAL LEFT HIP ARTHROPLASTY: ICD-10-CM

## 2025-03-03 PROCEDURE — 1036F TOBACCO NON-USER: CPT | Performed by: STUDENT IN AN ORGANIZED HEALTH CARE EDUCATION/TRAINING PROGRAM

## 2025-03-03 PROCEDURE — 99214 OFFICE O/P EST MOD 30 MIN: CPT | Performed by: STUDENT IN AN ORGANIZED HEALTH CARE EDUCATION/TRAINING PROGRAM

## 2025-03-03 PROCEDURE — 1157F ADVNC CARE PLAN IN RCRD: CPT | Performed by: STUDENT IN AN ORGANIZED HEALTH CARE EDUCATION/TRAINING PROGRAM

## 2025-03-03 PROCEDURE — 73502 X-RAY EXAM HIP UNI 2-3 VIEWS: CPT | Mod: RT

## 2025-03-03 PROCEDURE — G2211 COMPLEX E/M VISIT ADD ON: HCPCS | Performed by: STUDENT IN AN ORGANIZED HEALTH CARE EDUCATION/TRAINING PROGRAM

## 2025-03-03 PROCEDURE — 1159F MED LIST DOCD IN RCRD: CPT | Performed by: STUDENT IN AN ORGANIZED HEALTH CARE EDUCATION/TRAINING PROGRAM

## 2025-03-12 ENCOUNTER — APPOINTMENT (OUTPATIENT)
Dept: PRIMARY CARE | Facility: CLINIC | Age: 75
End: 2025-03-12
Payer: MEDICARE

## 2025-03-31 ENCOUNTER — OFFICE VISIT (OUTPATIENT)
Dept: PRIMARY CARE | Facility: CLINIC | Age: 75
End: 2025-03-31
Payer: MEDICARE

## 2025-03-31 VITALS
WEIGHT: 189 LBS | DIASTOLIC BLOOD PRESSURE: 78 MMHG | BODY MASS INDEX: 23.62 KG/M2 | SYSTOLIC BLOOD PRESSURE: 134 MMHG | TEMPERATURE: 97 F | OXYGEN SATURATION: 98 % | HEART RATE: 80 BPM | RESPIRATION RATE: 18 BRPM

## 2025-03-31 DIAGNOSIS — B35.1 ONYCHOMYCOSIS: Primary | ICD-10-CM

## 2025-03-31 PROCEDURE — 1157F ADVNC CARE PLAN IN RCRD: CPT | Performed by: FAMILY MEDICINE

## 2025-03-31 PROCEDURE — 3078F DIAST BP <80 MM HG: CPT | Performed by: FAMILY MEDICINE

## 2025-03-31 PROCEDURE — 1160F RVW MEDS BY RX/DR IN RCRD: CPT | Performed by: FAMILY MEDICINE

## 2025-03-31 PROCEDURE — 3075F SYST BP GE 130 - 139MM HG: CPT | Performed by: FAMILY MEDICINE

## 2025-03-31 PROCEDURE — 99213 OFFICE O/P EST LOW 20 MIN: CPT | Performed by: FAMILY MEDICINE

## 2025-03-31 PROCEDURE — 1159F MED LIST DOCD IN RCRD: CPT | Performed by: FAMILY MEDICINE

## 2025-03-31 PROCEDURE — 1036F TOBACCO NON-USER: CPT | Performed by: FAMILY MEDICINE

## 2025-03-31 ASSESSMENT — ENCOUNTER SYMPTOMS
JOINT SWELLING: 1
WHEEZING: 0
NUMBNESS: 0
COUGH: 0
WEAKNESS: 0
VOMITING: 0
DIARRHEA: 0
NAUSEA: 0
FEVER: 0
DIFFICULTY URINATING: 0
PALPITATIONS: 0
SHORTNESS OF BREATH: 0
HEMATURIA: 0

## 2025-03-31 NOTE — ASSESSMENT & PLAN NOTE
L great toenail with fungal infection, toenail is loose and at risk for avulsion  Will refer pt to podiatry for further care  Will defer antifungal tx to podiatry  F/up as needed

## 2025-03-31 NOTE — PROGRESS NOTES
Subjective   Patient ID: Rafael Walter is a 74 y.o. male who presents for Nail Problem.    PT reports toenail issues x weeks.    HPI     Review of Systems   Constitutional:  Negative for fever.   Respiratory:  Negative for cough, shortness of breath and wheezing.    Cardiovascular:  Negative for chest pain and palpitations.   Gastrointestinal:  Negative for diarrhea, nausea and vomiting.   Genitourinary:  Negative for difficulty urinating and hematuria.   Musculoskeletal:  Positive for joint swelling.        Left great toe mild swelling, nail discoloration, not painful  Pt denies trauma to toe, pt has been walking a lot recently  No blood or pus from nail/toe   Neurological:  Negative for weakness and numbness.       Objective   /78   Pulse 80   Temp 36.1 °C (97 °F)   Resp 18   Wt 85.7 kg (189 lb)   SpO2 98%   BMI 23.62 kg/m²     Physical Exam  Vitals and nursing note reviewed.   Constitutional:       General: He is not in acute distress.     Appearance: Normal appearance.   HENT:      Head: Normocephalic and atraumatic.   Cardiovascular:      Rate and Rhythm: Normal rate and regular rhythm.      Heart sounds: Normal heart sounds.   Pulmonary:      Effort: Pulmonary effort is normal.      Breath sounds: Normal breath sounds.   Skin:     General: Skin is warm and dry.      Comments: L 1st toenail with onychomycosis, small amt of dried blood under nail, thick and white discoloration of nail  There is movement of nail from nail bed  No active bleeding, or purulent disch, no sign of cellulitis  NT to palp, FROM, neurovasc intact   Neurological:      Mental Status: He is alert.         Assessment/Plan   Problem List Items Addressed This Visit             ICD-10-CM    Onychomycosis - Primary B35.1     L great toenail with fungal infection, toenail is loose and at risk for avulsion  Will refer pt to podiatry for further care  Will defer antifungal tx to podiatry  F/up as needed         Relevant Orders     Referral to Podiatry

## 2025-04-17 ENCOUNTER — APPOINTMENT (OUTPATIENT)
Facility: CLINIC | Age: 75
End: 2025-04-17
Payer: MEDICARE

## 2025-04-17 DIAGNOSIS — B35.1 ONYCHOMYCOSIS: ICD-10-CM

## 2025-04-17 PROCEDURE — 1157F ADVNC CARE PLAN IN RCRD: CPT | Performed by: PODIATRIST

## 2025-04-17 PROCEDURE — 1036F TOBACCO NON-USER: CPT | Performed by: PODIATRIST

## 2025-04-17 PROCEDURE — 1160F RVW MEDS BY RX/DR IN RCRD: CPT | Performed by: PODIATRIST

## 2025-04-17 PROCEDURE — 99203 OFFICE O/P NEW LOW 30 MIN: CPT | Performed by: PODIATRIST

## 2025-04-17 PROCEDURE — 1159F MED LIST DOCD IN RCRD: CPT | Performed by: PODIATRIST

## 2025-04-17 NOTE — PROGRESS NOTES
History Of Present Illness  Rafael Walter is a 74 y.o. male presenting with chief complaint of left hallux fungus.  He has been seen a dermatologist was recently diagnosed with ringworm.  He has been using econazole cream.  He states he does not feel this is improving his nail as well.  He would like his other nails evaluated for possible fungus as well.     Past Medical History  He has a past medical history of Abnormal finding of blood chemistry, unspecified (11/28/2012), Anxiety, BPH (benign prostatic hyperplasia), Claustrophobia, CLL (chronic lymphocytic leukemia) (Multi) (2014), Gout, History of splenomegaly, Hyperlipidemia, Hypertension, Long-term current use of intravenous immunoglobulin (IVIG), Lung nodule, Lupus nephritis (Multi), Osteoarthritis, Skin cancer (10/19/2014), Unilateral primary osteoarthritis, right hip, and Vitamin D deficiency.    Surgical History  He has a past surgical history that includes Colonoscopy (08/02/2016); Renal biopsy; Umbilical hernia repair (2015); Bone marrow biopsy; Hernia repair (05/18/2016); and Hip Arthroplasty (Left, 03/29/2024).     Social History  He reports that he has never smoked. He has never used smokeless tobacco. He reports that he does not currently use alcohol. He reports that he does not use drugs.    Family History  Family History[1]     Allergies  Oxycodone    Medications  Current Medications[2]    Review of Systems    REVIEW OF SYSTEMS  GENERAL:  Negative for malaise, significant weight loss, fever      Objective:   Vasc: DP and PT pulses are palpable bilateral.  CFT is less than 3 seconds bilateral.  Skin temperature is warm to cool proximal to distal bilateral.      Neuro:  Light touch is intact to the foot bilateral.      Derm: The left hallux, and bilateral second digits are thickened and discolored with subungual debris consistent with mycosis.  Remaining nails are clear.  There are round red skin patches over the lower extremity consistent with  ringworm        Assessment/Plan     Diagnoses and all orders for this visit:  Onychomycosis  -     Referral to Podiatry           Onychomycosis Treatment  Regarding  onychomycosis, we discussed several treatment options.  These treatments include oral Lamisil, topical Jublia, Topical Tolcylen, topical econazole, laser therapy, local debridement and home remedies.  We discussed the pros and cons of each.  They do understand taking the oral pill can cause side effects of liver damage given his past medical history they deferred this.  At this time he like to continue topical Eicon is all.  They were instructed to place this in the dorsal and plantar nail.  We will monitor his progress and may need to do a stronger topical if he is not fully responding.  They understand to call me should problems arise prior to follow-up    Lois Gibson DPM       [1]   Family History  Problem Relation Name Age of Onset    Other (parkinson) Mother      Pulmonary embolism Father      Arthritis Sister      Cancer Sister      Prostate cancer Brother     [2]   Current Outpatient Medications   Medication Sig Dispense Refill    ALPRAZolam (Xanax) 0.25 mg tablet Take 1 tablet (0.25 mg) by mouth 3 times a day as needed for anxiety (Flight anxiety). 10 tablet 0    cholecalciferol (Vitamin D3) 25 MCG (1000 UT) capsule Take 1 capsule (25 mcg) by mouth once daily.      losartan (Cozaar) 50 mg tablet TAKE 1 TABLET BY MOUTH EVERY DAY 90 tablet 2    sildenafil (Viagra) 50 mg tablet Take 1 tablet (50 mg) by mouth once daily as needed for erectile dysfunction. 1 HOUR BEFORE SEXUAL ACTIVITY 10 tablet 1     No current facility-administered medications for this visit.

## 2025-04-21 DIAGNOSIS — I10 BENIGN ESSENTIAL HYPERTENSION: ICD-10-CM

## 2025-04-21 RX ORDER — LOSARTAN POTASSIUM 50 MG/1
50 TABLET ORAL DAILY
Qty: 90 TABLET | Refills: 0 | Status: SHIPPED | OUTPATIENT
Start: 2025-04-21

## 2025-04-24 ENCOUNTER — APPOINTMENT (OUTPATIENT)
Dept: PODIATRY | Facility: CLINIC | Age: 75
End: 2025-04-24
Payer: MEDICARE

## 2025-04-24 ENCOUNTER — LAB (OUTPATIENT)
Dept: LAB | Facility: HOSPITAL | Age: 75
End: 2025-04-24
Payer: MEDICARE

## 2025-04-24 ENCOUNTER — OFFICE VISIT (OUTPATIENT)
Dept: HEMATOLOGY/ONCOLOGY | Facility: HOSPITAL | Age: 75
End: 2025-04-24
Payer: MEDICARE

## 2025-04-24 VITALS
SYSTOLIC BLOOD PRESSURE: 155 MMHG | RESPIRATION RATE: 16 BRPM | HEART RATE: 67 BPM | OXYGEN SATURATION: 99 % | WEIGHT: 188.1 LBS | TEMPERATURE: 97.9 F | BODY MASS INDEX: 23.51 KG/M2 | DIASTOLIC BLOOD PRESSURE: 72 MMHG

## 2025-04-24 DIAGNOSIS — C91.10 CHRONIC LYMPHOCYTIC LEUKEMIA (MULTI): ICD-10-CM

## 2025-04-24 DIAGNOSIS — R35.0 BENIGN PROSTATIC HYPERPLASIA WITH URINARY FREQUENCY: ICD-10-CM

## 2025-04-24 DIAGNOSIS — N40.1 BENIGN PROSTATIC HYPERPLASIA WITH URINARY FREQUENCY: ICD-10-CM

## 2025-04-24 LAB
ALBUMIN SERPL BCP-MCNC: 4.5 G/DL (ref 3.4–5)
ALP SERPL-CCNC: 78 U/L (ref 33–136)
ALT SERPL W P-5'-P-CCNC: 11 U/L (ref 10–52)
ANION GAP SERPL CALC-SCNC: 12 MMOL/L (ref 10–20)
AST SERPL W P-5'-P-CCNC: 14 U/L (ref 9–39)
BASOPHILS # BLD AUTO: 0.04 X10*3/UL (ref 0–0.1)
BASOPHILS NFR BLD AUTO: 0.9 %
BILIRUB SERPL-MCNC: 0.6 MG/DL (ref 0–1.2)
BUN SERPL-MCNC: 15 MG/DL (ref 6–23)
CALCIUM SERPL-MCNC: 9.6 MG/DL (ref 8.6–10.3)
CHLORIDE SERPL-SCNC: 101 MMOL/L (ref 98–107)
CO2 SERPL-SCNC: 31 MMOL/L (ref 21–32)
CREAT SERPL-MCNC: 0.89 MG/DL (ref 0.5–1.3)
EGFRCR SERPLBLD CKD-EPI 2021: 89 ML/MIN/1.73M*2
EOSINOPHIL # BLD AUTO: 0.1 X10*3/UL (ref 0–0.4)
EOSINOPHIL NFR BLD AUTO: 2.2 %
ERYTHROCYTE [DISTWIDTH] IN BLOOD BY AUTOMATED COUNT: 15.4 % (ref 11.5–14.5)
GLUCOSE SERPL-MCNC: 68 MG/DL (ref 74–99)
HCT VFR BLD AUTO: 48.8 % (ref 41–52)
HGB BLD-MCNC: 15.8 G/DL (ref 13.5–17.5)
IMM GRANULOCYTES # BLD AUTO: 0.01 X10*3/UL (ref 0–0.5)
IMM GRANULOCYTES NFR BLD AUTO: 0.2 % (ref 0–0.9)
LDH SERPL L TO P-CCNC: 99 U/L (ref 84–246)
LYMPHOCYTES # BLD AUTO: 1.51 X10*3/UL (ref 0.8–3)
LYMPHOCYTES NFR BLD AUTO: 32.6 %
MCH RBC QN AUTO: 27.2 PG (ref 26–34)
MCHC RBC AUTO-ENTMCNC: 32.4 G/DL (ref 32–36)
MCV RBC AUTO: 84 FL (ref 80–100)
MONOCYTES # BLD AUTO: 0.5 X10*3/UL (ref 0.05–0.8)
MONOCYTES NFR BLD AUTO: 10.8 %
NEUTROPHILS # BLD AUTO: 2.47 X10*3/UL (ref 1.6–5.5)
NEUTROPHILS NFR BLD AUTO: 53.3 %
NRBC BLD-RTO: 0 /100 WBCS (ref 0–0)
PLATELET # BLD AUTO: 147 X10*3/UL (ref 150–450)
POTASSIUM SERPL-SCNC: 3.8 MMOL/L (ref 3.5–5.3)
PROT SERPL-MCNC: 7.7 G/DL (ref 6.4–8.2)
RBC # BLD AUTO: 5.8 X10*6/UL (ref 4.5–5.9)
SODIUM SERPL-SCNC: 140 MMOL/L (ref 136–145)
WBC # BLD AUTO: 4.6 X10*3/UL (ref 4.4–11.3)

## 2025-04-24 PROCEDURE — 83615 LACTATE (LD) (LDH) ENZYME: CPT

## 2025-04-24 PROCEDURE — 99213 OFFICE O/P EST LOW 20 MIN: CPT | Performed by: INTERNAL MEDICINE

## 2025-04-24 PROCEDURE — 36415 COLL VENOUS BLD VENIPUNCTURE: CPT

## 2025-04-24 PROCEDURE — 80053 COMPREHEN METABOLIC PANEL: CPT

## 2025-04-24 PROCEDURE — 1157F ADVNC CARE PLAN IN RCRD: CPT | Performed by: INTERNAL MEDICINE

## 2025-04-24 PROCEDURE — 85025 COMPLETE CBC W/AUTO DIFF WBC: CPT

## 2025-04-24 PROCEDURE — 3077F SYST BP >= 140 MM HG: CPT | Performed by: INTERNAL MEDICINE

## 2025-04-24 PROCEDURE — 3078F DIAST BP <80 MM HG: CPT | Performed by: INTERNAL MEDICINE

## 2025-04-24 PROCEDURE — 1126F AMNT PAIN NOTED NONE PRSNT: CPT | Performed by: INTERNAL MEDICINE

## 2025-04-24 PROCEDURE — 84154 ASSAY OF PSA FREE: CPT

## 2025-04-24 ASSESSMENT — PAIN SCALES - GENERAL: PAINLEVEL_OUTOF10: 0-NO PAIN

## 2025-04-24 NOTE — PROGRESS NOTES
Patient ID: Rafael Walter is a 74 y.o. male.    Subjective    The patient has a history of CLL dated first in 2019, most recently having completed Venetoclax + ritux in 7/2022, details below.     Today he feels well. Had Lt hip replacement in 3/2024 the in 7/2024, using titanium. No fever, weight loss, night sweating. No nausea.     Treatment Synopsis:   - 1/2016 until 2/2019, CASE 5913 (curcumin + vitamin D for CLL).   - One cycle of Chlorambucil  (started on 6/10/2019 8mg per day) and obinutuzumab (started 7/22/19 when PLT <100).  Obinutuzumab restarted on 9/4/19 with profound neutropenia .  - Venetoclax 200 mg daily (with fluconazole) with monthly rituximab  6/1/2020.  - Cycle #2 started on 7/13/20.  - Cycle #3 started on 8/10/20-therapy changed from 200 mg venetoclax dosing to 400  mg and fluconazole discontinued.  - Cycle #4 9/10/20- clon seq showed excellent response, CT chest showed resolution of bibasilar infiltrates.  - Cycle #5 started 10/8/2020.  - Cycle #6 11/5/2020- completed rituximab.  - Venetoclax continued through 7/2022 (2 full years).            Objective    BSA: 2.12 meters squared  /72 (BP Location: Right arm, Patient Position: Sitting, BP Cuff Size: Large adult)   Pulse 67   Temp 36.6 °C (97.9 °F) (Skin)   Resp 16   Wt 85.3 kg (188 lb 1.6 oz)   SpO2 99%   BMI 23.51 kg/m²      Physical Exam  Constitutional:       General: He is not in acute distress.     Appearance: He is not toxic-appearing.   HENT:      Head: Normocephalic.      Nose: Nose normal.      Mouth/Throat:      Mouth: Mucous membranes are moist.   Eyes:      Extraocular Movements: Extraocular movements intact.      Pupils: Pupils are equal, round, and reactive to light.   Cardiovascular:      Rate and Rhythm: Normal rate and regular rhythm.      Heart sounds: No murmur heard.  Pulmonary:      Effort: Pulmonary effort is normal.      Breath sounds: Normal breath sounds.   Abdominal:      General: Bowel sounds are normal.       Palpations: Abdomen is soft. There is no mass.      Tenderness: There is no abdominal tenderness. There is no rebound.   Musculoskeletal:         General: No swelling, tenderness, deformity or signs of injury.      Right lower leg: No edema.      Left lower leg: No edema.   Skin:     Coloration: Skin is not jaundiced.      Findings: No bruising, lesion or rash.   Neurological:      Mental Status: He is alert and oriented to person, place, and time.      Cranial Nerves: No cranial nerve deficit.      Motor: No weakness.      Gait: Gait normal.   Psychiatric:         Mood and Affect: Mood normal.         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  - S/p curcumin + vit D [id number] (CASE 5913).  - WBC progressed and peaked at 286k on 6/4/2019. (was 184k IN 2/2019)  - S/p chlorambucil + obinutuzumab started 6/2019; c/b mold PNA.  - By June 2020 WBC was essentially normal, but clonoseq shows persistent clones (>8000) in blood.  - S/p Venetoclax + rituximab (6/2020-11/2020). Venetoclax continued through 7/2022.  - Doing well OFF therapy at this time. CBC/ALC stable. No new worrisome symptoms reported.  - 3/21/2024: clinically doing well without worrisome signs of relapse. No new lab. Labs in 12/2023 show mildly decreased PLT level, which has actually been recovering. No clear indication to initiate new treatments. MRD remains an investigational tool. Will defer testing.   -5/10/2024: CBC shows improvement in HGB PLT. Continue surveillance.   -10/24: Feels well. Cbc shows normal hgb, . WBC WNL. No concern. Continue surveillance.      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 No recent infections.  - He is no longer on acyclovir or Bactrim.    Plan  4/24/2025:  -He is receiving IVIG q1mon and thinks it is beneficial.   -No progression with CLL. Will monitor q3mon. Next with JAMIE Cathy.   -He and wife are very careful with sick contact.     10/24/2024  -Monitor for relapse.  -RTC 6 mon. Labs 1 week before.  -Add PSA per his request.      Time spent > 25 min, with more than 50% on counseling and coordinating care.     Cancer Staging   No matching staging information was found for the patient.      Oncology History    No history exists.                 Cecilio Luciano MD PhD

## 2025-04-27 LAB
PSA FREE MFR SERPL: 26 %
PSA FREE SERPL-MCNC: 1.1 NG/ML
PSA SERPL IA-MCNC: 4.3 NG/ML (ref 0–4)

## 2025-04-30 ENCOUNTER — APPOINTMENT (OUTPATIENT)
Dept: PRIMARY CARE | Facility: CLINIC | Age: 75
End: 2025-04-30
Payer: MEDICARE

## 2025-04-30 VITALS
WEIGHT: 193 LBS | HEART RATE: 74 BPM | TEMPERATURE: 97.7 F | BODY MASS INDEX: 24.12 KG/M2 | SYSTOLIC BLOOD PRESSURE: 140 MMHG | OXYGEN SATURATION: 99 % | DIASTOLIC BLOOD PRESSURE: 70 MMHG | RESPIRATION RATE: 16 BRPM

## 2025-04-30 DIAGNOSIS — Z12.11 COLON CANCER SCREENING: Primary | ICD-10-CM

## 2025-04-30 DIAGNOSIS — C91.10 CHRONIC LYMPHOCYTIC LEUKEMIA (MULTI): ICD-10-CM

## 2025-04-30 DIAGNOSIS — I10 BENIGN ESSENTIAL HYPERTENSION: ICD-10-CM

## 2025-04-30 PROCEDURE — 1036F TOBACCO NON-USER: CPT | Performed by: INTERNAL MEDICINE

## 2025-04-30 PROCEDURE — 3077F SYST BP >= 140 MM HG: CPT | Performed by: INTERNAL MEDICINE

## 2025-04-30 PROCEDURE — 1157F ADVNC CARE PLAN IN RCRD: CPT | Performed by: INTERNAL MEDICINE

## 2025-04-30 PROCEDURE — G0009 ADMIN PNEUMOCOCCAL VACCINE: HCPCS | Performed by: INTERNAL MEDICINE

## 2025-04-30 PROCEDURE — G2211 COMPLEX E/M VISIT ADD ON: HCPCS | Performed by: INTERNAL MEDICINE

## 2025-04-30 PROCEDURE — 90677 PCV20 VACCINE IM: CPT | Performed by: INTERNAL MEDICINE

## 2025-04-30 PROCEDURE — 99214 OFFICE O/P EST MOD 30 MIN: CPT | Performed by: INTERNAL MEDICINE

## 2025-04-30 PROCEDURE — 3078F DIAST BP <80 MM HG: CPT | Performed by: INTERNAL MEDICINE

## 2025-04-30 PROCEDURE — 1160F RVW MEDS BY RX/DR IN RCRD: CPT | Performed by: INTERNAL MEDICINE

## 2025-04-30 PROCEDURE — 1158F ADVNC CARE PLAN TLK DOCD: CPT | Performed by: INTERNAL MEDICINE

## 2025-04-30 PROCEDURE — 1159F MED LIST DOCD IN RCRD: CPT | Performed by: INTERNAL MEDICINE

## 2025-04-30 PROCEDURE — 1123F ACP DISCUSS/DSCN MKR DOCD: CPT | Performed by: INTERNAL MEDICINE

## 2025-04-30 ASSESSMENT — PATIENT HEALTH QUESTIONNAIRE - PHQ9
1. LITTLE INTEREST OR PLEASURE IN DOING THINGS: NOT AT ALL
SUM OF ALL RESPONSES TO PHQ9 QUESTIONS 1 AND 2: 0
2. FEELING DOWN, DEPRESSED OR HOPELESS: NOT AT ALL

## 2025-04-30 ASSESSMENT — ENCOUNTER SYMPTOMS
CONSTIPATION: 0
ABDOMINAL PAIN: 0
DIARRHEA: 0

## 2025-04-30 NOTE — PROGRESS NOTES
Patient here for a 6 month follow up    Subjective   Patient ID: Rafael Walter is a 74 y.o. male who presents for Follow-up.    The patient monitors his blood pressure at home, and states that values are well controlled.  He recalls one aberrant systolic reading in the 150s mmHg when he forgot to take his medication that morning.  The patient is maintained with losartan 50 mg once daily, and reports fairly good compliance.  The blood pressure in the clinic today was 140/70 mmHg.    The patient continues to follow with  from Hematology/Oncology every three months for a history of CLL, and states that the condition is stable.    The patient notes weakening of the urinary stream, though this is tolerable to date.  The last PSA was elevated at 4.3 in 4/2025.    The patient recently met with Dermatology for ring worm of the toenail, and states that this is responding to topical treatment with econazole cream.  He is currently following with  from Podiatry as well.    The patient has a history of left and right hip replacement surgeries in 3/2024 and 7/2024 respectively.  He continues to follow with Orthopedic Surgery.    The patient denies any abdominal pain, bowel problems, or known family history of colon cancer.  He will be completing a Cologuard soon for routine screening.    The patient will be flying to [place] tomorrow.      Review of Systems   Gastrointestinal:  Negative for abdominal pain, constipation and diarrhea.   Genitourinary:         Positive for weakening of the urinary stream.   All other systems reviewed and are negative.    Objective   Physical Exam  Constitutional:       Appearance: Normal appearance.   Neck:      Vascular: No carotid bruit.   Cardiovascular:      Rate and Rhythm: Normal rate and regular rhythm.      Heart sounds: Normal heart sounds.   Pulmonary:      Effort: Pulmonary effort is normal.      Breath sounds: Normal breath sounds.   Abdominal:      General: Bowel  sounds are normal.      Palpations: Abdomen is soft.      Tenderness: There is no abdominal tenderness.   Skin:     General: Skin is warm and dry.   Neurological:      General: No focal deficit present.      Mental Status: He is alert and oriented to person, place, and time. Mental status is at baseline.   Psychiatric:         Mood and Affect: Mood normal.         Behavior: Behavior normal.       Assessment/Plan   Problem List Items Addressed This Visit    None  Visit Diagnoses         Codes      Colon cancer screening    -  Primary Z12.11    Relevant Orders    Cologuard® colon cancer screening            IMPRESSIONS/PLAN:     HTN   - /70. Continue Losartan 50 mg daily.       HLD  - Triglycerides high per 4/2023.     Panic Attacks  - Maintained with alprazolam 0.25 up to TID prn.  Call the clinic if symptoms persist or worsen.     Lupus nephritis   - Seen by Nephrology and Rheumatology     BPH/Nocturia   - Last PSA elevated at 4.3 per 4/2025.     Left Hip Pain  - s/p left total hip arthroplasty 3/29/2024 with  from Orthopedic Surgery.      Right Hip Pain  - s/p right total hip arthroplasty 7/11/2024 with  from Orthopedic Surgery.      Chronic lymphocytic leukemia   - Following with  from Oncology.  Previously followed with Dr. Field in Oncology.     Skin Lesion:  - Previously ordered referral to  in Dermatology.     Health Maintenance  -Routine labs 4/2025, 9/2024.  Last PSA elevated at 4.3 per 4/2025.  Will monitor.  If increasing or symptoms worsening- will ask uro to eval.  Last Cologuard 2022, ordered repeat for 2025.  Patient will receive Prevnar-20 vaccine in the clinic today.  Discussed adverse effect profile.      Follow up in 6 months.  Call sooner if needed.       Scribe Attestation  By signing my name below, ILyly, Frank   attest that this documentation has been prepared under the direction and in the presence of DO. Lyly Hernandez  George 04/30/25 11:14 AM

## 2025-05-15 ENCOUNTER — APPOINTMENT (OUTPATIENT)
Facility: CLINIC | Age: 75
End: 2025-05-15
Payer: MEDICARE

## 2025-05-19 DIAGNOSIS — N52.9 MALE ERECTILE DISORDER OF ORGANIC ORIGIN: ICD-10-CM

## 2025-05-19 RX ORDER — SILDENAFIL 50 MG/1
50 TABLET, FILM COATED ORAL DAILY PRN
Qty: 10 TABLET | Refills: 1 | Status: SHIPPED | OUTPATIENT
Start: 2025-05-19

## 2025-05-21 LAB — NONINV COLON CA DNA+OCC BLD SCRN STL QL: NEGATIVE

## 2025-07-21 DIAGNOSIS — I10 BENIGN ESSENTIAL HYPERTENSION: ICD-10-CM

## 2025-07-22 RX ORDER — LOSARTAN POTASSIUM 50 MG/1
50 TABLET ORAL DAILY
Qty: 90 TABLET | Refills: 2 | Status: SHIPPED | OUTPATIENT
Start: 2025-07-22

## 2025-07-23 PROBLEM — D80.1 HYPOGAMMAGLOBULINEMIA (MULTI): Status: ACTIVE | Noted: 2025-07-23

## 2025-07-23 NOTE — PROGRESS NOTES
Patient ID: Rafael Walter is a 74 y.o. male.       ASSESSMENT & PLAN     Oncology History    No history exists.       07/23/25 Here today for routine follow up for CLL.     Assessment & Plan  Chronic lymphocytic leukemia (Multi)   Dx 2014; FISH: del(13q), IgHV mutated; TP53 negative.  - S/p curcumin + vit D [id number] (CASE 5913).  - WBC progressed and peaked at 286k on 6/4/2019. (was 184k IN 2/2019)  - S/p chlorambucil + obinutuzumab started 6/2019; c/b mold PNA.  - By June 2020 WBC was essentially normal, but clonoseq shows persistent clones (>8000) in blood.  - S/p Venetoclax + rituximab (6/2020-11/2020). Venetoclax continued through 7/2022.  - Doing well OFF therapy at this time. CBC/ALC stable. No new worrisome symptoms reported.  - 3/21/2024: clinically doing well without worrisome signs of relapse. No new lab. Labs in 12/2023 show mildly decreased PLT level, which has actually been recovering. No clear indication to initiate new treatments. MRD remains an investigational tool. Will defer testing.   -5/10/2024: CBC shows improvement in HGB PLT. Continue surveillance.   -10/24: Feels well. Cbc shows normal hgb, . WBC WNL. No concern. Continue surveillance.   -4/24/2025 Today he feels well. No symptoms. CBC are nearly WNL with . No there concern of relapse.   -7/24/25: Clinically doing well. CBC and CMP WNL. Ok to continue q3mon follow up. Skin nodule will continue to  monitor and follow up with dermatology.   Hypogammaglobulinemia (Multi)  -Receiving IVIG monthly with his immunologist Dr. De La Garza   -Not on acyclovir or bactrim     RTC  3 months with Dr Luciano  Advised to call the answering service for any new or worsening symptoms     SUBJECTIVE     HPI    The patient has a history of CLL dated first in 2019, most recently having completed Venetoclax + ritux in 7/2022, details below.      Had Lt hip replacement in 3/2024 the in 7/2024, using titanium.      4/24/2025 Today he feels well.  No fever, weight loss, night sweating. No nausea. He comments he is very careful about sick contact, and was not properly informed by the previous provider about infection risks while on chemo (venetoclax).     Treatment Synopsis:   - 1/2016 until 2/2019, CASE 5913 (curcumin + vitamin D for CLL).   - One cycle of Chlorambucil  (started on 6/10/2019 8mg per day) and obinutuzumab (started 7/22/19 when PLT <100).  Obinutuzumab restarted on 9/4/19 with profound neutropenia .  - Venetoclax 200 mg daily (with fluconazole) with monthly rituximab  6/1/2020.  - Cycle #2 started on 7/13/20.  - Cycle #3 started on 8/10/20-therapy changed from 200 mg venetoclax dosing to 400  mg and fluconazole discontinued.  - Cycle #4 9/10/20- clon seq showed excellent response, CT chest showed resolution of bibasilar infiltrates.  - Cycle #5 started 10/8/2020.  - Cycle #6 11/5/2020- completed rituximab.  - Venetoclax continued through 7/2022 (2 full years).    Raafel Walter presents today for follow up, accompanied by his wife. Overall he states he is doing very well and has no complaints. Staying very active. Working outside in garden and also works out at gym. His wife states he sometimes might be too active.     Did have recent infection of ringworm that was treated. Also being treated for fungal infection on toenails.     States he has skin nodule that has been there for years and remains unchanged. States he recently saw dermatologist but did not address there.     Otherwise feeling well. No fevers, chills, nausea, vomiting, diarrhea, rashes, bruising or bleeding.     Review of Systems   Constitutional:  Negative for appetite change, chills, diaphoresis, fatigue and fever.   Respiratory:  Negative for cough, shortness of breath and wheezing.    Cardiovascular:  Negative for chest pain, leg swelling and palpitations.   Gastrointestinal:  Negative for abdominal pain, constipation, diarrhea, nausea and vomiting.   Genitourinary:  Negative  for dysuria and frequency.    Musculoskeletal:  Negative for gait problem.   Skin:  Negative for itching, rash and wound.   Neurological:  Negative for dizziness, gait problem, headaches, light-headedness and numbness.   Hematological:  Negative for adenopathy. Does not bruise/bleed easily.   Psychiatric/Behavioral:  Negative for confusion. The patient is not nervous/anxious.        Medical History[1]  Social History[2]   Surgical History[3]      OBJECTIVE     BSA: There is no height or weight on file to calculate BSA.  There were no vitals taken for this visit.       Physical Exam  Constitutional:       Appearance: Normal appearance.   HENT:      Head: Normocephalic and atraumatic.     Cardiovascular:      Rate and Rhythm: Normal rate and regular rhythm.      Pulses: Normal pulses.      Heart sounds: Normal heart sounds.   Pulmonary:      Effort: Pulmonary effort is normal. No respiratory distress.      Breath sounds: Normal breath sounds. No wheezing.   Abdominal:      General: There is no distension.      Palpations: Abdomen is soft.      Tenderness: There is no abdominal tenderness.     Musculoskeletal:         General: Normal range of motion.      Right lower leg: No edema.      Left lower leg: No edema.   Lymphadenopathy:      Cervical: No cervical adenopathy.      Upper Body:      Right upper body: No supraclavicular or axillary adenopathy.      Left upper body: No supraclavicular or axillary adenopathy.     Skin:     Coloration: Skin is not jaundiced.      Findings: No bruising, lesion or rash.      Comments: Skin nodule felt on right upper back (chronic)     Neurological:      General: No focal deficit present.      Mental Status: He is alert and oriented to person, place, and time.     Psychiatric:         Mood and Affect: Mood normal.         Behavior: Behavior normal.         Thought Content: Thought content normal.         Judgment: Judgment normal.         Performance Status:  Karnofsky Score: 100 -  Fully active, able to carry on all pre-disease performed without restriction           Viktoriya Farrell PA-C                  [1]   Past Medical History:  Diagnosis Date    Abnormal finding of blood chemistry, unspecified 11/28/2012    Abnormal blood chemistry    Anxiety     BPH (benign prostatic hyperplasia)     Claustrophobia     unable to lay flat    CLL (chronic lymphocytic leukemia) (Multi) 2014    in remission, off oral meds x 2 years, last Hem/Onc visit 12/14/23-stable    Gout     History of splenomegaly     resolved per patient    Hyperlipidemia     Hypertension     Long-term current use of intravenous immunoglobulin (IVIG)     next treatment 3/25/24    Lung nodule     Lupus nephritis (Multi)     Osteoarthritis     Skin cancer 10/19/2014    left upper face    Unilateral primary osteoarthritis, right hip     Vitamin D deficiency    [2]   Social History  Tobacco Use    Smoking status: Never    Smokeless tobacco: Never   Vaping Use    Vaping status: Never Used   Substance Use Topics    Alcohol use: Not Currently    Drug use: Never   [3]   Past Surgical History:  Procedure Laterality Date    BONE MARROW BIOPSY      8/2019, 1/20/2020    COLONOSCOPY  08/02/2016    HERNIA REPAIR  05/18/2016    Inguinal hernia    HIP ARTHROPLASTY Left 03/29/2024    RENAL BIOPSY      UMBILICAL HERNIA REPAIR  2015

## 2025-07-23 NOTE — ASSESSMENT & PLAN NOTE
Dx 2014; FISH: del(13q), IgHV mutated; TP53 negative.  - S/p curcumin + vit D [id number] (CASE 5913).  - WBC progressed and peaked at 286k on 6/4/2019. (was 184k IN 2/2019)  - S/p chlorambucil + obinutuzumab started 6/2019; c/b mold PNA.  - By June 2020 WBC was essentially normal, but clonoseq shows persistent clones (>8000) in blood.  - S/p Venetoclax + rituximab (6/2020-11/2020). Venetoclax continued through 7/2022.  - Doing well OFF therapy at this time. CBC/ALC stable. No new worrisome symptoms reported.  - 3/21/2024: clinically doing well without worrisome signs of relapse. No new lab. Labs in 12/2023 show mildly decreased PLT level, which has actually been recovering. No clear indication to initiate new treatments. MRD remains an investigational tool. Will defer testing.   -5/10/2024: CBC shows improvement in HGB PLT. Continue surveillance.   -10/24: Feels well. Cbc shows normal hgb, . WBC WNL. No concern. Continue surveillance.   -4/24/2025 Today he feels well. No symptoms. CBC are nearly WNL with . No there concern of relapse.   -7/24/25: Clinically doing well. CBC and CMP WNL. Ok to continue q3mon follow up. Skin nodule will continue to  monitor and follow up with dermatology.

## 2025-07-24 ENCOUNTER — LAB (OUTPATIENT)
Dept: LAB | Facility: HOSPITAL | Age: 75
End: 2025-07-24
Payer: MEDICARE

## 2025-07-24 ENCOUNTER — OFFICE VISIT (OUTPATIENT)
Dept: HEMATOLOGY/ONCOLOGY | Facility: HOSPITAL | Age: 75
End: 2025-07-24
Payer: MEDICARE

## 2025-07-24 VITALS
HEART RATE: 61 BPM | OXYGEN SATURATION: 100 % | WEIGHT: 188.3 LBS | RESPIRATION RATE: 16 BRPM | DIASTOLIC BLOOD PRESSURE: 72 MMHG | BODY MASS INDEX: 24.17 KG/M2 | SYSTOLIC BLOOD PRESSURE: 136 MMHG | HEIGHT: 74 IN | TEMPERATURE: 96.6 F

## 2025-07-24 DIAGNOSIS — C91.10 CHRONIC LYMPHOCYTIC LEUKEMIA (MULTI): Primary | ICD-10-CM

## 2025-07-24 DIAGNOSIS — C91.10 CHRONIC LYMPHOCYTIC LEUKEMIA (MULTI): ICD-10-CM

## 2025-07-24 DIAGNOSIS — D80.1 HYPOGAMMAGLOBULINEMIA (MULTI): ICD-10-CM

## 2025-07-24 LAB
ALBUMIN SERPL BCP-MCNC: 4.4 G/DL (ref 3.4–5)
ALP SERPL-CCNC: 85 U/L (ref 33–136)
ALT SERPL W P-5'-P-CCNC: 12 U/L (ref 10–52)
ANION GAP SERPL CALC-SCNC: 10 MMOL/L (ref 10–20)
AST SERPL W P-5'-P-CCNC: 16 U/L (ref 9–39)
BASOPHILS # BLD AUTO: 0.04 X10*3/UL (ref 0–0.1)
BASOPHILS NFR BLD AUTO: 0.7 %
BILIRUB SERPL-MCNC: 0.8 MG/DL (ref 0–1.2)
BUN SERPL-MCNC: 15 MG/DL (ref 6–23)
CALCIUM SERPL-MCNC: 9.5 MG/DL (ref 8.6–10.3)
CHLORIDE SERPL-SCNC: 102 MMOL/L (ref 98–107)
CO2 SERPL-SCNC: 29 MMOL/L (ref 21–32)
CREAT SERPL-MCNC: 0.98 MG/DL (ref 0.5–1.3)
EGFRCR SERPLBLD CKD-EPI 2021: 80 ML/MIN/1.73M*2
EOSINOPHIL # BLD AUTO: 0.07 X10*3/UL (ref 0–0.4)
EOSINOPHIL NFR BLD AUTO: 1.3 %
ERYTHROCYTE [DISTWIDTH] IN BLOOD BY AUTOMATED COUNT: 14.7 % (ref 11.5–14.5)
GLUCOSE SERPL-MCNC: 97 MG/DL (ref 74–99)
HCT VFR BLD AUTO: 48.8 % (ref 41–52)
HGB BLD-MCNC: 15.8 G/DL (ref 13.5–17.5)
IMM GRANULOCYTES # BLD AUTO: 0.02 X10*3/UL (ref 0–0.5)
IMM GRANULOCYTES NFR BLD AUTO: 0.4 % (ref 0–0.9)
LDH SERPL L TO P-CCNC: 106 U/L (ref 84–246)
LYMPHOCYTES # BLD AUTO: 1.28 X10*3/UL (ref 0.8–3)
LYMPHOCYTES NFR BLD AUTO: 23.1 %
MCH RBC QN AUTO: 27.5 PG (ref 26–34)
MCHC RBC AUTO-ENTMCNC: 32.4 G/DL (ref 32–36)
MCV RBC AUTO: 85 FL (ref 80–100)
MONOCYTES # BLD AUTO: 0.44 X10*3/UL (ref 0.05–0.8)
MONOCYTES NFR BLD AUTO: 8 %
NEUTROPHILS # BLD AUTO: 3.68 X10*3/UL (ref 1.6–5.5)
NEUTROPHILS NFR BLD AUTO: 66.5 %
NRBC BLD-RTO: 0 /100 WBCS (ref 0–0)
PLATELET # BLD AUTO: 153 X10*3/UL (ref 150–450)
POTASSIUM SERPL-SCNC: 4.2 MMOL/L (ref 3.5–5.3)
PROT SERPL-MCNC: 7.5 G/DL (ref 6.4–8.2)
RBC # BLD AUTO: 5.74 X10*6/UL (ref 4.5–5.9)
SODIUM SERPL-SCNC: 137 MMOL/L (ref 136–145)
WBC # BLD AUTO: 5.5 X10*3/UL (ref 4.4–11.3)

## 2025-07-24 PROCEDURE — G2211 COMPLEX E/M VISIT ADD ON: HCPCS

## 2025-07-24 PROCEDURE — 99214 OFFICE O/P EST MOD 30 MIN: CPT

## 2025-07-24 PROCEDURE — 83615 LACTATE (LD) (LDH) ENZYME: CPT

## 2025-07-24 PROCEDURE — 84075 ASSAY ALKALINE PHOSPHATASE: CPT

## 2025-07-24 PROCEDURE — 3078F DIAST BP <80 MM HG: CPT

## 2025-07-24 PROCEDURE — 85025 COMPLETE CBC W/AUTO DIFF WBC: CPT

## 2025-07-24 PROCEDURE — 3075F SYST BP GE 130 - 139MM HG: CPT

## 2025-07-24 PROCEDURE — 1126F AMNT PAIN NOTED NONE PRSNT: CPT

## 2025-07-24 PROCEDURE — 36415 COLL VENOUS BLD VENIPUNCTURE: CPT

## 2025-07-24 ASSESSMENT — ENCOUNTER SYMPTOMS
SHORTNESS OF BREATH: 0
VOMITING: 0
BRUISES/BLEEDS EASILY: 0
DYSURIA: 0
ADENOPATHY: 0
DIARRHEA: 0
LIGHT-HEADEDNESS: 0
APPETITE CHANGE: 0
DIZZINESS: 0
CONSTIPATION: 0
PALPITATIONS: 0
FREQUENCY: 0
HEADACHES: 0
CHILLS: 0
NERVOUS/ANXIOUS: 0
FATIGUE: 0
FEVER: 0
DIAPHORESIS: 0
ABDOMINAL PAIN: 0
WHEEZING: 0
WOUND: 0
CONFUSION: 0
NUMBNESS: 0
NAUSEA: 0
LEG SWELLING: 0
COUGH: 0

## 2025-07-24 ASSESSMENT — PAIN SCALES - GENERAL: PAINLEVEL_OUTOF10: 0-NO PAIN

## 2025-10-17 ENCOUNTER — APPOINTMENT (OUTPATIENT)
Dept: PODIATRY | Facility: CLINIC | Age: 75
End: 2025-10-17
Payer: MEDICARE

## 2025-10-23 ENCOUNTER — APPOINTMENT (OUTPATIENT)
Dept: HEMATOLOGY/ONCOLOGY | Facility: HOSPITAL | Age: 75
End: 2025-10-23
Payer: MEDICARE

## 2025-11-05 ENCOUNTER — APPOINTMENT (OUTPATIENT)
Dept: PRIMARY CARE | Facility: CLINIC | Age: 75
End: 2025-11-05
Payer: MEDICARE

## (undated) DEVICE — SUTURE, VICRYL, 3-0,18 IN, SH, UNDYED

## (undated) DEVICE — SUTURE, ETHIBOND XTRA, 5 V-37, GRN/BR, LF

## (undated) DEVICE — SUTURE, STRATAFIX, SYMMETRIC PDS PLUS, SIZE 1, 45CM, CT 40MM VIOLET

## (undated) DEVICE — HIGH FLOW TIP FOR INTERPULSE HANDPIECE SET

## (undated) DEVICE — 6.0MM X 7.9MM X 14CM MIDAS REX MR8 CYLINDER TOOL, FLUTED, LARGE BORE

## (undated) DEVICE — INTERPULSE HANDPIECE SET W/ 10FT SUCTION TUBING

## (undated) DEVICE — TOWEL, SURGICAL, NEURO, O/R, 16 X 26, BLUE, STERILE

## (undated) DEVICE — ELECTRODE, ELECTROSURGICAL, BLADE, STANDARD, 2.75 IN

## (undated) DEVICE — STAPLER, SKIN, FIXED HEAD, WIDE, 35

## (undated) DEVICE — DRESSING, MEPILEX BORDER, POST-OP AG, 4 X 6 IN

## (undated) DEVICE — SUTURE, ETHIBOND, P2, V-37, 30 IN, GREEN

## (undated) DEVICE — ADHESIVE, SKIN, DERMABOND, MINI TISSUE

## (undated) DEVICE — DRAPE, SHEET, THREE QUARTER, FAN FOLD, 57 X 77 IN

## (undated) DEVICE — DRAPE, ISOLATION, ANTIMICROBIAL, W/POUCH, IOBAN 2, STERI DRAPE, 125 X 83 IN, DISPOSABLE, STERILE

## (undated) DEVICE — SUTURE, STRATAFIX PDS PLUS, 1, OS-6, SYMMETRIC 60CM

## (undated) DEVICE — Device

## (undated) DEVICE — APPLICATOR, CHLORAPREP, W/ORANGE TINT, 26ML

## (undated) DEVICE — ELECTRODE, ELECTROSURGICAL, BLADE, NONSTICK, MODIFIED, 6.5 IN X 165 MM

## (undated) DEVICE — BLADE, SAW PFC DUAL CUT 25 X 90

## (undated) DEVICE — SYRINGE, 60 CC, LUER LOCK, MONOJECT

## (undated) DEVICE — DRAPE, INCISE, ANTIMICROBIAL, IOBAN 2, STERI DRAPE, 23 X 33 IN, DISPOSABLE, STERILE

## (undated) DEVICE — STAPLER, SKIN, PLUS, WIDE, 35

## (undated) DEVICE — ADHESIVE, SKIN, LIQUIBAND EXCEED

## (undated) DEVICE — SUTURE, VICRYL, 0, 18 IN, UNDYED

## (undated) DEVICE — DRAPE, TOWEL, SURGICAL, O.R, 17 X 24IN, STERILE, BLUE

## (undated) DEVICE — STAPLER, SKIN PROXIMATE, 35 WIDE

## (undated) DEVICE — COVER, C-ARM W/CLIPS, OEC GE

## (undated) DEVICE — GLOVE, PROTEXIS PI CLASSIC, SZ-7.0, PF, LF

## (undated) DEVICE — CLOSURE SYSTEM, DERMABOND, PRINEO, 22CM, STERILE

## (undated) DEVICE — SUTURE, VICRYL, 0, 18 IN, CT-1, UNDYED

## (undated) DEVICE — SUTURE, VICRYL, 0, 27 IN, CT-1, UNDYED

## (undated) DEVICE — SUTURE, VICRYL, 3-0, 54 IN, TIE, VIOLET

## (undated) DEVICE — NEEDLE, REVERSE CUTTING, W/NITINOL LOOP, C-13, 1/2 CIRCLE, 36.6MML

## (undated) DEVICE — SUTURE, STRATAFIX, SPIRAL MONOCRYL PLUS, 3-0, PS-1 45CM, UNDYED

## (undated) DEVICE — HOOD, SURGICAL, FLYTE SURGICOOL

## (undated) DEVICE — WOUND SYSTEM, DEBRIDEMENT & CLEANING, O.R DUOPAK